# Patient Record
Sex: FEMALE | Race: WHITE | NOT HISPANIC OR LATINO | Employment: UNEMPLOYED | URBAN - METROPOLITAN AREA
[De-identification: names, ages, dates, MRNs, and addresses within clinical notes are randomized per-mention and may not be internally consistent; named-entity substitution may affect disease eponyms.]

---

## 2017-03-07 PROBLEM — M85.80 OSTEOPENIA: Status: ACTIVE | Noted: 2017-03-07

## 2017-03-08 ENCOUNTER — OFFICE VISIT (OUTPATIENT)
Dept: FAMILY MEDICINE CLINIC | Facility: CLINIC | Age: 57
End: 2017-03-08

## 2017-03-08 VITALS
HEART RATE: 75 BPM | WEIGHT: 110 LBS | OXYGEN SATURATION: 98 % | BODY MASS INDEX: 20.24 KG/M2 | DIASTOLIC BLOOD PRESSURE: 80 MMHG | SYSTOLIC BLOOD PRESSURE: 128 MMHG | HEIGHT: 62 IN | TEMPERATURE: 97.6 F

## 2017-03-08 DIAGNOSIS — R74.8 ELEVATED LIVER ENZYMES: Primary | ICD-10-CM

## 2017-03-08 LAB
HAV IGM SERPL QL IA: NORMAL
HBV CORE IGM SERPL QL IA: NORMAL
HBV SURFACE AG SERPL QL IA: NORMAL
HCV AB SER DONR QL: NORMAL

## 2017-03-08 PROCEDURE — 99213 OFFICE O/P EST LOW 20 MIN: CPT | Performed by: NURSE PRACTITIONER

## 2017-03-08 PROCEDURE — 36415 COLL VENOUS BLD VENIPUNCTURE: CPT | Performed by: NURSE PRACTITIONER

## 2017-03-08 PROCEDURE — 80074 ACUTE HEPATITIS PANEL: CPT | Performed by: NURSE PRACTITIONER

## 2017-03-08 NOTE — PROGRESS NOTES
Subjective   Kimberly Brito is a 56 y.o. female.     History of Present Illness Patient is here to review her labs from rheumatology. They showed elevated AST. She admits to increasing her alcohol consumption in the last 6 months.  She will drink 3 mixed drinks of hard liquor each night.  She does not work so the drinking is not interfering with her life. She does not drink in the morning to start her day. Feels she is able to stop at any time. No abd pain or nausea. No change in bowels.    The following portions of the patient's history were reviewed and updated as appropriate: allergies, current medications, past family history, past medical history, past social history, past surgical history and problem list.    Review of Systems   Constitutional: Negative for fatigue, fever and unexpected weight change.   HENT: Negative for congestion, hearing loss, nosebleeds, rhinorrhea, sore throat, trouble swallowing and voice change.    Eyes: Negative for pain, discharge, redness and visual disturbance.   Respiratory: Negative for cough, chest tightness, shortness of breath and wheezing.    Cardiovascular: Negative for chest pain, palpitations and leg swelling.   Gastrointestinal: Negative for abdominal distention, abdominal pain, anal bleeding, blood in stool, constipation, diarrhea, nausea and vomiting.   Endocrine: Negative for cold intolerance, heat intolerance, polydipsia, polyphagia and polyuria.   Genitourinary: Negative for dysuria, flank pain, frequency and hematuria.   Musculoskeletal: Negative for arthralgias, gait problem, joint swelling and myalgias.   Skin: Negative for color change and rash.   Neurological: Negative for dizziness, tremors, seizures, syncope, speech difficulty, weakness, numbness and headaches.   Hematological: Negative.    Psychiatric/Behavioral: Negative.        Objective   Physical Exam   Constitutional: She is oriented to person, place, and time. She appears well-developed and well-nourished.  No distress.   HENT:   Head: Normocephalic and atraumatic.   Right Ear: Tympanic membrane and external ear normal.   Left Ear: Tympanic membrane and external ear normal.   Nose: Nose normal.   Mouth/Throat: Oropharynx is clear and moist. No oropharyngeal exudate.   Eyes: Conjunctivae are normal. Pupils are equal, round, and reactive to light. Right eye exhibits no discharge. Left eye exhibits no discharge. No scleral icterus.   Neck: Neck supple. No tracheal deviation present. No thyromegaly present.   Cardiovascular: Normal rate, regular rhythm and normal heart sounds.  Exam reveals no gallop and no friction rub.    No murmur heard.  Pulmonary/Chest: Effort normal and breath sounds normal. No respiratory distress. She has no wheezes.   Abdominal: Soft. Bowel sounds are normal. She exhibits no distension and no mass. There is no tenderness.   Musculoskeletal: She exhibits no edema or deformity.   Lymphadenopathy:     She has no cervical adenopathy.   Neurological: She is alert and oriented to person, place, and time. Coordination normal.   Skin: Skin is warm and dry. No rash noted. No erythema.   Psychiatric: She has a normal mood and affect. Her speech is normal and behavior is normal. Judgment and thought content normal.   Nursing note and vitals reviewed.      Assessment/Plan   Kimberly was seen today for results.    Diagnoses and all orders for this visit:    Elevated liver enzymes  -     Hepatitis Panel, Acute    Discussed the nature of the disease including, risks, complications, implications, management, safe and proper use of medications. Encouraged keeping scheduled follow up appointments with me and any other providers. Encouraged patient to have appointment for complete physical, fasting labs, appropriate screenings, and immunizations on an annual basis.  She will abstain from alcohol. Repeat labs in 2 months.

## 2017-04-17 ENCOUNTER — OFFICE VISIT (OUTPATIENT)
Dept: FAMILY MEDICINE CLINIC | Facility: CLINIC | Age: 57
End: 2017-04-17

## 2017-04-17 VITALS
SYSTOLIC BLOOD PRESSURE: 106 MMHG | TEMPERATURE: 97.6 F | HEIGHT: 62 IN | HEART RATE: 71 BPM | WEIGHT: 111 LBS | OXYGEN SATURATION: 99 % | DIASTOLIC BLOOD PRESSURE: 70 MMHG | BODY MASS INDEX: 20.43 KG/M2

## 2017-04-17 DIAGNOSIS — Z00.00 PHYSICAL EXAM: Primary | ICD-10-CM

## 2017-04-17 DIAGNOSIS — M81.0 OSTEOPOROSIS: ICD-10-CM

## 2017-04-17 LAB
25(OH)D3 SERPL-MCNC: 36.4 NG/ML
ALBUMIN SERPL-MCNC: 4.4 G/DL (ref 3.2–4.8)
ALBUMIN/GLOB SERPL: 1.4 G/DL (ref 1.5–2.5)
ALP SERPL-CCNC: 34 U/L (ref 25–100)
ALT SERPL W P-5'-P-CCNC: 20 U/L (ref 7–40)
ANION GAP SERPL CALCULATED.3IONS-SCNC: 6 MMOL/L (ref 3–11)
ARTICHOKE IGE QN: 141 MG/DL (ref 0–130)
AST SERPL-CCNC: 35 U/L (ref 0–33)
BASOPHILS # BLD AUTO: 0.01 10*3/MM3 (ref 0–0.2)
BASOPHILS NFR BLD AUTO: 0.2 % (ref 0–1)
BILIRUB BLD-MCNC: NEGATIVE MG/DL
BILIRUB SERPL-MCNC: 0.8 MG/DL (ref 0.3–1.2)
BUN BLD-MCNC: 16 MG/DL (ref 9–23)
BUN/CREAT SERPL: 16 (ref 7–25)
CALCIUM SPEC-SCNC: 9.7 MG/DL (ref 8.7–10.4)
CHLORIDE SERPL-SCNC: 105 MMOL/L (ref 99–109)
CHOLEST SERPL-MCNC: 246 MG/DL (ref 0–200)
CLARITY, POC: CLEAR
CO2 SERPL-SCNC: 28 MMOL/L (ref 20–31)
COLOR UR: YELLOW
CREAT BLD-MCNC: 1 MG/DL (ref 0.6–1.3)
DEPRECATED RDW RBC AUTO: 44.8 FL (ref 37–54)
EOSINOPHIL # BLD AUTO: 0.06 10*3/MM3 (ref 0.1–0.3)
EOSINOPHIL NFR BLD AUTO: 1.3 % (ref 0–3)
ERYTHROCYTE [DISTWIDTH] IN BLOOD BY AUTOMATED COUNT: 13.2 % (ref 11.3–14.5)
GFR SERPL CREATININE-BSD FRML MDRD: 58 ML/MIN/1.73
GLOBULIN UR ELPH-MCNC: 3.2 GM/DL
GLUCOSE BLD-MCNC: 90 MG/DL (ref 70–100)
GLUCOSE UR STRIP-MCNC: NEGATIVE MG/DL
HCT VFR BLD AUTO: 42.4 % (ref 34.5–44)
HDLC SERPL-MCNC: 89 MG/DL (ref 40–60)
HGB BLD-MCNC: 13.8 G/DL (ref 11.5–15.5)
IMM GRANULOCYTES # BLD: 0 10*3/MM3 (ref 0–0.03)
IMM GRANULOCYTES NFR BLD: 0 % (ref 0–0.6)
KETONES UR QL: ABNORMAL
LEUKOCYTE EST, POC: NEGATIVE
LYMPHOCYTES # BLD AUTO: 1.11 10*3/MM3 (ref 0.6–4.8)
LYMPHOCYTES NFR BLD AUTO: 24.2 % (ref 24–44)
MCH RBC QN AUTO: 30.1 PG (ref 27–31)
MCHC RBC AUTO-ENTMCNC: 32.5 G/DL (ref 32–36)
MCV RBC AUTO: 92.4 FL (ref 80–99)
MONOCYTES # BLD AUTO: 0.48 10*3/MM3 (ref 0–1)
MONOCYTES NFR BLD AUTO: 10.5 % (ref 0–12)
NEUTROPHILS # BLD AUTO: 2.93 10*3/MM3 (ref 1.5–8.3)
NEUTROPHILS NFR BLD AUTO: 63.8 % (ref 41–71)
NITRITE UR-MCNC: NEGATIVE MG/ML
PH UR: 5.5 [PH] (ref 5–8)
PLATELET # BLD AUTO: 156 10*3/MM3 (ref 150–450)
PMV BLD AUTO: 10.6 FL (ref 6–12)
POTASSIUM BLD-SCNC: 5.2 MMOL/L (ref 3.5–5.5)
PROT SERPL-MCNC: 7.6 G/DL (ref 5.7–8.2)
PROT UR STRIP-MCNC: NEGATIVE MG/DL
RBC # BLD AUTO: 4.59 10*6/MM3 (ref 3.89–5.14)
RBC # UR STRIP: NEGATIVE /UL
SODIUM BLD-SCNC: 139 MMOL/L (ref 132–146)
SP GR UR: 1.02 (ref 1–1.03)
TRIGL SERPL-MCNC: 48 MG/DL (ref 0–150)
TSH SERPL DL<=0.05 MIU/L-ACNC: 1.54 MIU/ML (ref 0.35–5.35)
UROBILINOGEN UR QL: NORMAL
WBC NRBC COR # BLD: 4.59 10*3/MM3 (ref 3.5–10.8)

## 2017-04-17 PROCEDURE — 82306 VITAMIN D 25 HYDROXY: CPT | Performed by: FAMILY MEDICINE

## 2017-04-17 PROCEDURE — 81003 URINALYSIS AUTO W/O SCOPE: CPT | Performed by: FAMILY MEDICINE

## 2017-04-17 PROCEDURE — 36415 COLL VENOUS BLD VENIPUNCTURE: CPT | Performed by: FAMILY MEDICINE

## 2017-04-17 PROCEDURE — 80061 LIPID PANEL: CPT | Performed by: FAMILY MEDICINE

## 2017-04-17 PROCEDURE — 80053 COMPREHEN METABOLIC PANEL: CPT | Performed by: FAMILY MEDICINE

## 2017-04-17 PROCEDURE — 85025 COMPLETE CBC W/AUTO DIFF WBC: CPT | Performed by: FAMILY MEDICINE

## 2017-04-17 PROCEDURE — 84443 ASSAY THYROID STIM HORMONE: CPT | Performed by: FAMILY MEDICINE

## 2017-04-17 PROCEDURE — 99396 PREV VISIT EST AGE 40-64: CPT | Performed by: FAMILY MEDICINE

## 2017-04-17 NOTE — PROGRESS NOTES
Subjective   Kimberly Brito is a 56 y.o. female.     History of Present Illness   The patient is here for a physical exam.    She states she is doing well.  Denies any chest pain or shortness of breath.    Does not smoke.  Does use occasional alcohol.  Denies drug use.    Sees her eye doctor every two years.  Sees her dentist twice a year.  Sees Dr Quezada with dermatology.  Sees Dr Tabares with Rheumatology.    Sees Dr Walsh for GYN exam and mammograms. Last exam was in August 2016. Last mammogram was in May 2016.  Also for bone density checks . Last bone density was in Jan 2016 -Osteoporosis.Taking Prolia injections twice a year.    She is up to date on immunizations.    The following portions of the patient's history were reviewed and updated as appropriate: allergies, current medications, past family history, past medical history, past social history, past surgical history and problem list.    Review of Systems   Constitutional: Negative.    HENT: Negative.         Occasional episodes of swollen taste buds of the back of her tongue.     Eyes: Negative.    Respiratory: Negative.    Cardiovascular: Negative.    Gastrointestinal: Negative.    Endocrine: Positive for cold intolerance. Negative for heat intolerance, polydipsia, polyphagia and polyuria.   Genitourinary: Negative.    Musculoskeletal: Negative.    Skin: Negative.    Allergic/Immunologic: Negative.    Neurological: Negative.    Hematological: Negative.    Psychiatric/Behavioral: Negative.        Objective   Physical Exam   Constitutional: She is oriented to person, place, and time. She appears well-developed and well-nourished. No distress.   HENT:   Head: Normocephalic and atraumatic.   Right Ear: External ear normal.   Left Ear: External ear normal.   Nose: Nose normal.   Mouth/Throat: Oropharynx is clear and moist.   Eyes: Conjunctivae and EOM are normal. Pupils are equal, round, and reactive to light.   Neck: Normal range of motion. Neck supple. No  thyromegaly present.   Cardiovascular: Normal rate, regular rhythm, normal heart sounds and intact distal pulses.    Pulmonary/Chest: Effort normal and breath sounds normal. No respiratory distress. She has no wheezes. She has no rales.   Abdominal: Soft. Bowel sounds are normal. There is no tenderness.   Musculoskeletal: Normal range of motion.   Neurological: She is alert and oriented to person, place, and time. She has normal reflexes.   Skin: Skin is warm and dry. She is not diaphoretic.   Psychiatric: She has a normal mood and affect. Her behavior is normal. Judgment and thought content normal.   Nursing note and vitals reviewed.      Assessment/Plan   Kimberly was seen today for annual exam.    Diagnoses and all orders for this visit:    Physical exam  -     POCT urinalysis dipstick, automated  -     CBC & Differential  -     Comprehensive Metabolic Panel  -     Lipid Panel  -     TSH  -     Vitamin D 25 Hydroxy    Osteoporosis  -     Vitamin D 25 Hydroxy                Drink plenty fluids.    Continue medications as doing.    Check a CBC,CMP,Lipids,TSH and Vitamin D. Report results by letter.    Scribed for Dr Sushil Jj by Anu Chaparro CMA.    I, Sushil Jj MD, personally performed the services described in this documentation, as scribed by Anu Chaparro in my presence, and is both accurate and complete.      Follow up in 1 year. Sooner if needed.

## 2017-07-24 ENCOUNTER — TRANSCRIBE ORDERS (OUTPATIENT)
Dept: OBSTETRICS AND GYNECOLOGY | Facility: CLINIC | Age: 57
End: 2017-07-24

## 2017-07-24 DIAGNOSIS — Z12.31 VISIT FOR SCREENING MAMMOGRAM: Primary | ICD-10-CM

## 2017-08-14 ENCOUNTER — HOSPITAL ENCOUNTER (OUTPATIENT)
Dept: MAMMOGRAPHY | Facility: HOSPITAL | Age: 57
Discharge: HOME | End: 2017-08-14
Attending: OBSTETRICS & GYNECOLOGY | Admitting: OBSTETRICS & GYNECOLOGY

## 2017-08-14 DIAGNOSIS — Z12.31 VISIT FOR SCREENING MAMMOGRAM: ICD-10-CM

## 2017-08-14 PROCEDURE — 77063 BREAST TOMOSYNTHESIS BI: CPT | Performed by: RADIOLOGY

## 2017-08-14 PROCEDURE — 77063 BREAST TOMOSYNTHESIS BI: CPT

## 2017-08-14 PROCEDURE — 77067 SCR MAMMO BI INCL CAD: CPT | Performed by: RADIOLOGY

## 2017-08-14 PROCEDURE — G0202 SCR MAMMO BI INCL CAD: HCPCS

## 2017-08-30 ENCOUNTER — OFFICE VISIT (OUTPATIENT)
Dept: OBSTETRICS AND GYNECOLOGY | Facility: CLINIC | Age: 57
End: 2017-08-30

## 2017-08-30 VITALS
SYSTOLIC BLOOD PRESSURE: 100 MMHG | BODY MASS INDEX: 20.5 KG/M2 | WEIGHT: 111.4 LBS | HEIGHT: 62 IN | DIASTOLIC BLOOD PRESSURE: 66 MMHG

## 2017-08-30 DIAGNOSIS — M81.0 OSTEOPOROSIS: ICD-10-CM

## 2017-08-30 DIAGNOSIS — N95.2 VAGINAL ATROPHY: ICD-10-CM

## 2017-08-30 DIAGNOSIS — Z78.0 MENOPAUSE: Primary | ICD-10-CM

## 2017-08-30 DIAGNOSIS — Z01.419 ENCOUNTER FOR GYNECOLOGICAL EXAMINATION WITHOUT ABNORMAL FINDING: ICD-10-CM

## 2017-08-30 PROBLEM — M85.80 OSTEOPENIA: Status: RESOLVED | Noted: 2017-03-07 | Resolved: 2017-08-30

## 2017-08-30 PROCEDURE — 99396 PREV VISIT EST AGE 40-64: CPT | Performed by: OBSTETRICS & GYNECOLOGY

## 2017-08-30 NOTE — PROGRESS NOTES
Chief Complaint   Patient presents with   • Gynecologic Exam       Kimberly Brito is a 56 y.o. year old  presenting to be seen for her annual exam.This patient is menopausal and does not use estrogen/progestin therapy.  She has a history of vaginal atrophy but has not desired treatment.  She has a history of osteoporosis and is treated at the [facility] with Prolia injections every 6 months.  She has no other gynecologic complaints.    SCREENING TESTS    Year 2012   Age                         PAP     Neg.                    HPV high risk                         Mammogram     benign benign                   DAMON score                         Breast MRI                         Lipids                         Vitamin D                         Colonoscopy                         DEXA  Frax (hip/any)   osteoporosis  osteoporosis                    Ovarian Screen                             She exercises regularly: yes.  She wears her seat belt: yes.  She has concerns about domestic violence: no.  She has noticed changes in height: no    GYN screening history:  · Last pap: was done on approximately 8/15/2016 and the result was: normal PAP.  · Last mammogram: was done on approximately 2017 and the result was: Birads I (Normal).  · Last DEXA: was done on approximately 8/15/2016 and the results were: osteopenia of hips, osteopenia of spine and osteoporosis of the femoral neck.    No Additional Complaints Reported    The following portions of the patient's history were reviewed and updated as appropriate:vital signs and   She  does not have any pertinent problems on file.  She  has a past surgical history that includes Excision Lesion (Left); Colonoscopy (2012); and Breast biopsy (Left, 10/14/2008).  Her family history includes Breast cancer (age of onset: 75) in her mother; Colon cancer in her  "mother; Diabetes in her brother and father; Heart disease in her brother and maternal grandfather; Lung cancer in her father; Ovarian cancer in her maternal aunt; Stroke in her maternal grandmother.  She  reports that she has never smoked. She has never used smokeless tobacco. She reports that she drinks about 8.4 oz of alcohol per week  She reports that she does not use illicit drugs.  Current Outpatient Prescriptions   Medication Sig Dispense Refill   • calcium carbonate-vitamin d (CALCIUM 600+D) 600-400 MG-UNIT per tablet Take 1 tablet by mouth Daily.     • Multiple Vitamins-Minerals (MULTIVITAMIN ADULT PO) Take 1 tablet by mouth Daily.     • denosumab (PROLIA) 60 MG/ML solution syringe Inject 1 dose under the skin every 6 (six) months.       No current facility-administered medications for this visit.      She is allergic to alendronate..    Review of Systems  A comprehensive review of systems was taken.  Constitutional: negative for fever, chills, activity change, appetite change, fatigue and unexpected weight change.  Respiratory: negative  Cardiovascular: negative  Gastrointestinal: negative  Genitourinary:negative  Musculoskeletal:negative  Behavioral/Psych: negative       /66  Ht 62\" (157.5 cm)  Wt 111 lb 6.4 oz (50.5 kg)  LMP  (Approximate)  BMI 20.38 kg/m2    Physical Exam    General:  alert; cooperative; well developed; well nourished   Skin:  No suspicious lesions seen   Thyroid: normal to inspection and palpation   Lungs:  clear to auscultation bilaterally   Heart:  regular rate and rhythm, S1, S2 normal, no murmur, click, rub or gallop   Breasts:  Examined in supine position  Symmetric without masses or skin dimpling  Nipples normal without inversion, lesions or discharge  There are no palpable axillary nodes   Abdomen: soft, non-tender; no masses  no umbilical or inginual hernias are present  no hepato-splenomegaly   Pelvis: Clinical staff was present for exam  External genitalia:  normal " appearance of the external genitalia including Bartholin's and Derwood's glands.  Vaginal:  atrophic mucosal changes are present;  Cervix:  normal appearance.  Uterus:  normal size, shape and consistency. anteverted;  Adnexa:  non palpable bilaterally.  Rectal:  anus visually normal appearing. recto-vaginal exam unremarkable and confirms findings;     Lab Review   No data reviewed    Imaging  Mammogram results- benign, and DEXA- osteoporosis of the femoral neck and osteopenia of the total hip and spine         ASSESSMENT  Problems Addressed this Visit        Musculoskeletal and Integument    Osteoporosis       Genitourinary    Menopause - Primary    Vaginal atrophy      Other Visit Diagnoses     Encounter for gynecological examination without abnormal finding        Relevant Orders    Liquid-based Pap Smear, Screening          PLAN    Medications prescribed this encounter:    New Medications Ordered This Visit   Medications   • calcium carbonate-vitamin d (CALCIUM 600+D) 600-400 MG-UNIT per tablet     Sig: Take 1 tablet by mouth Daily.   • Multiple Vitamins-Minerals (MULTIVITAMIN ADULT PO)     Sig: Take 1 tablet by mouth Daily.   · DEXA in January of 2018  · Follow up at the [facility] for Prolia injections  · Calcium, 600 mg/ Vit. D, 400 IU daily; regular weight-bearing exercise  · Follow up: 12 month(s)  *Please note that portions of this documentation may have been completed with a voice recognition program.  Efforts were made to edit this dictation, but occasional words may have been mistranscribed.       This note was electronically signed.    SHAYNE Walsh MD  August 30, 2017  1:46 PM

## 2018-01-22 ENCOUNTER — HOSPITAL ENCOUNTER (OUTPATIENT)
Dept: BONE DENSITY | Facility: HOSPITAL | Age: 58
Discharge: HOME | End: 2018-01-22
Attending: OBSTETRICS & GYNECOLOGY | Admitting: OBSTETRICS & GYNECOLOGY

## 2018-01-22 DIAGNOSIS — M81.0 OSTEOPOROSIS: ICD-10-CM

## 2018-01-22 PROCEDURE — 77080 DXA BONE DENSITY AXIAL: CPT

## 2018-06-06 ENCOUNTER — OFFICE VISIT (OUTPATIENT)
Dept: FAMILY MEDICINE CLINIC | Facility: CLINIC | Age: 58
End: 2018-06-06

## 2018-06-06 VITALS
BODY MASS INDEX: 20.43 KG/M2 | OXYGEN SATURATION: 98 % | WEIGHT: 111 LBS | DIASTOLIC BLOOD PRESSURE: 66 MMHG | HEART RATE: 68 BPM | HEIGHT: 62 IN | SYSTOLIC BLOOD PRESSURE: 104 MMHG | TEMPERATURE: 97.4 F | RESPIRATION RATE: 16 BRPM

## 2018-06-06 DIAGNOSIS — M81.6 LOCALIZED OSTEOPOROSIS WITHOUT CURRENT PATHOLOGICAL FRACTURE: ICD-10-CM

## 2018-06-06 DIAGNOSIS — Z00.00 WELL ADULT EXAM: Primary | ICD-10-CM

## 2018-06-06 LAB
25(OH)D3 SERPL-MCNC: 27.9 NG/ML
ALBUMIN SERPL-MCNC: 4.42 G/DL (ref 3.2–4.8)
ALBUMIN/GLOB SERPL: 1.5 G/DL (ref 1.5–2.5)
ALP SERPL-CCNC: 39 U/L (ref 25–100)
ALT SERPL W P-5'-P-CCNC: 16 U/L (ref 7–40)
ANION GAP SERPL CALCULATED.3IONS-SCNC: 9 MMOL/L (ref 3–11)
ARTICHOKE IGE QN: 138 MG/DL (ref 0–130)
AST SERPL-CCNC: 31 U/L (ref 0–33)
BASOPHILS # BLD AUTO: 0.01 10*3/MM3 (ref 0–0.2)
BASOPHILS NFR BLD AUTO: 0.2 % (ref 0–1)
BILIRUB BLD-MCNC: NEGATIVE MG/DL
BILIRUB SERPL-MCNC: 0.9 MG/DL (ref 0.3–1.2)
BUN BLD-MCNC: 12 MG/DL (ref 9–23)
BUN/CREAT SERPL: 14.1 (ref 7–25)
CALCIUM SPEC-SCNC: 8.8 MG/DL (ref 8.7–10.4)
CHLORIDE SERPL-SCNC: 104 MMOL/L (ref 99–109)
CHOLEST SERPL-MCNC: 247 MG/DL (ref 0–200)
CLARITY, POC: CLEAR
CO2 SERPL-SCNC: 27 MMOL/L (ref 20–31)
COLOR UR: YELLOW
CREAT BLD-MCNC: 0.85 MG/DL (ref 0.6–1.3)
DEPRECATED RDW RBC AUTO: 45.4 FL (ref 37–54)
EOSINOPHIL # BLD AUTO: 0.11 10*3/MM3 (ref 0–0.3)
EOSINOPHIL NFR BLD AUTO: 2.4 % (ref 0–3)
ERYTHROCYTE [DISTWIDTH] IN BLOOD BY AUTOMATED COUNT: 13.7 % (ref 11.3–14.5)
EXPIRATION DATE: ABNORMAL
GFR SERPL CREATININE-BSD FRML MDRD: 69 ML/MIN/1.73
GLOBULIN UR ELPH-MCNC: 3 GM/DL
GLUCOSE BLD-MCNC: 84 MG/DL (ref 70–100)
GLUCOSE UR STRIP-MCNC: NEGATIVE MG/DL
HCT VFR BLD AUTO: 43 % (ref 34.5–44)
HDLC SERPL-MCNC: 107 MG/DL (ref 40–60)
HGB BLD-MCNC: 14.3 G/DL (ref 11.5–15.5)
IMM GRANULOCYTES # BLD: 0 10*3/MM3 (ref 0–0.03)
IMM GRANULOCYTES NFR BLD: 0 % (ref 0–0.6)
KETONES UR QL: ABNORMAL
LEUKOCYTE EST, POC: NEGATIVE
LYMPHOCYTES # BLD AUTO: 0.99 10*3/MM3 (ref 0.6–4.8)
LYMPHOCYTES NFR BLD AUTO: 22 % (ref 24–44)
Lab: ABNORMAL
MCH RBC QN AUTO: 30.2 PG (ref 27–31)
MCHC RBC AUTO-ENTMCNC: 33.3 G/DL (ref 32–36)
MCV RBC AUTO: 90.7 FL (ref 80–99)
MONOCYTES # BLD AUTO: 0.47 10*3/MM3 (ref 0–1)
MONOCYTES NFR BLD AUTO: 10.4 % (ref 0–12)
NEUTROPHILS # BLD AUTO: 2.93 10*3/MM3 (ref 1.5–8.3)
NEUTROPHILS NFR BLD AUTO: 65 % (ref 41–71)
NITRITE UR-MCNC: NEGATIVE MG/ML
PH UR: 6.5 [PH] (ref 5–8)
PLATELET # BLD AUTO: 154 10*3/MM3 (ref 150–450)
PMV BLD AUTO: 10.9 FL (ref 6–12)
POTASSIUM BLD-SCNC: 4 MMOL/L (ref 3.5–5.5)
PROT SERPL-MCNC: 7.4 G/DL (ref 5.7–8.2)
PROT UR STRIP-MCNC: ABNORMAL MG/DL
RBC # BLD AUTO: 4.74 10*6/MM3 (ref 3.89–5.14)
RBC # UR STRIP: NEGATIVE /UL
SODIUM BLD-SCNC: 140 MMOL/L (ref 132–146)
SP GR UR: 1.02 (ref 1–1.03)
TRIGL SERPL-MCNC: 72 MG/DL (ref 0–150)
TSH SERPL DL<=0.05 MIU/L-ACNC: 2.44 MIU/ML (ref 0.35–5.35)
UROBILINOGEN UR QL: NORMAL
WBC NRBC COR # BLD: 4.51 10*3/MM3 (ref 3.5–10.8)

## 2018-06-06 PROCEDURE — 84443 ASSAY THYROID STIM HORMONE: CPT | Performed by: FAMILY MEDICINE

## 2018-06-06 PROCEDURE — 36415 COLL VENOUS BLD VENIPUNCTURE: CPT | Performed by: FAMILY MEDICINE

## 2018-06-06 PROCEDURE — 80061 LIPID PANEL: CPT | Performed by: FAMILY MEDICINE

## 2018-06-06 PROCEDURE — 80053 COMPREHEN METABOLIC PANEL: CPT | Performed by: FAMILY MEDICINE

## 2018-06-06 PROCEDURE — 85025 COMPLETE CBC W/AUTO DIFF WBC: CPT | Performed by: FAMILY MEDICINE

## 2018-06-06 PROCEDURE — 81003 URINALYSIS AUTO W/O SCOPE: CPT | Performed by: FAMILY MEDICINE

## 2018-06-06 PROCEDURE — 82306 VITAMIN D 25 HYDROXY: CPT | Performed by: FAMILY MEDICINE

## 2018-06-06 PROCEDURE — 99396 PREV VISIT EST AGE 40-64: CPT | Performed by: FAMILY MEDICINE

## 2018-06-06 NOTE — PROGRESS NOTES
Subjective   Kimberly Brito is a 57 y.o. female.     History of Present Illness   The patient is here for a physical exam.     She states she is doing well.  Denies any chest pain or shortness of breath.     Does not smoke.  Does use occasional alcohol.  Denies drug use.     Sees her eye doctor every two years. Wears reading glasses.  Sees her dentist twice a year.  Sees Dr Quezada with dermatology.  Sees Dr Tabares with Rheumatology.     Sees Dr Walsh for GYN exam and mammograms. Last exam was in August 2016. Last mammogram was 08/31/2017.  Also for bone density checks . Last bone density was in Jan 2016 -Osteoporosis.Taking Prolia injections twice a year.     She is up to date on immunizations.      The following portions of the patient's history were reviewed and updated as appropriate: allergies, current medications, past social history and problem list.    Review of Systems   Constitutional: Negative.    HENT: Negative.    Eyes: Negative.    Respiratory: Negative.    Cardiovascular: Negative.    Gastrointestinal: Negative.    Endocrine: Positive for cold intolerance. Negative for heat intolerance, polydipsia, polyphagia and polyuria.   Genitourinary: Negative.    Musculoskeletal: Positive for arthralgias (left shoulderimproving with PT). Negative for back pain, gait problem, joint swelling, myalgias, neck pain and neck stiffness.   Skin: Positive for rash (neck and scalp occasionally). Negative for color change, pallor and wound.   Allergic/Immunologic: Negative.    Neurological: Positive for numbness (right hand/fingers occasionally). Negative for dizziness, tremors, seizures, syncope, facial asymmetry, speech difficulty, weakness, light-headedness and headaches.   Hematological: Negative for adenopathy. Bruises/bleeds easily (easy bruising).   Psychiatric/Behavioral: Positive for sleep disturbance (Insomnia). Negative for agitation, behavioral problems, confusion, decreased concentration, dysphoric mood,  "hallucinations, self-injury and suicidal ideas. The patient is not nervous/anxious and is not hyperactive.        Objective   /66   Pulse 68   Temp 97.4 °F (36.3 °C)   Resp 16   Ht 157.5 cm (62\")   Wt 50.3 kg (111 lb)   LMP  (LMP Unknown)   SpO2 98%   BMI 20.30 kg/m²   Physical Exam   Constitutional: She is oriented to person, place, and time. She appears well-developed and well-nourished. No distress.   HENT:   Head: Normocephalic and atraumatic.   Right Ear: External ear normal.   Left Ear: External ear normal.   Nose: Nose normal.   Mouth/Throat: Oropharynx is clear and moist.   Eyes: Conjunctivae and EOM are normal. Pupils are equal, round, and reactive to light.   Neck: Normal range of motion. Neck supple. No thyromegaly present.   Cardiovascular: Normal rate, regular rhythm, normal heart sounds and intact distal pulses.    Pulmonary/Chest: Effort normal and breath sounds normal. No respiratory distress. She has no wheezes. She has no rales. She exhibits no tenderness.   Abdominal: Soft. Bowel sounds are normal. There is no tenderness.   Musculoskeletal: Normal range of motion.   Neurological: She is alert and oriented to person, place, and time. She has normal reflexes.   Skin: Skin is warm and dry. She is not diaphoretic.   Psychiatric: She has a normal mood and affect. Her behavior is normal. Judgment and thought content normal.   Nursing note and vitals reviewed.      Assessment/Plan   Problem List Items Addressed This Visit        Musculoskeletal and Integument    Osteoporosis      Other Visit Diagnoses     Well adult exam    -  Primary              Drink plenty fluids.    Continue medications as doing.    Add OTC Vitamin D 2000 units daily.    Check a UA,CBC,CMP,Lipids,Vitamin D, and TSH. Report results by letter.    Follow up in 1 year. Sooner if needed.        We discussed with the patient the importance of maintaining a healthy weight by observing a diet that is low in carbohydrates.  We " also recommended avoiding consumption of high levels of sodium and caffeine to prevent hypertension. We recommended daily exercise and obtaining a weight with a BMI less than 26.  We also recommended avoiding the use of tobacco and alcohol.  We also recommended an annual physical with their primary care physician.        Scribed for Dr Sushil Jj by Anu Chaparro CMA.          Sushil ELLSWORTH MD, personally performed the services described in this documentation, as scribed by Anu Chaparro in my presence, and is both accurate and complete.          Sushil ELLSWORTH MD, personally performed the services described in this documentation, as scribed by Anu Chaparro in my presence, and is both accurate and complete.

## 2018-06-29 ENCOUNTER — OFFICE VISIT (OUTPATIENT)
Dept: FAMILY MEDICINE CLINIC | Facility: CLINIC | Age: 58
End: 2018-06-29

## 2018-06-29 VITALS
TEMPERATURE: 98.4 F | SYSTOLIC BLOOD PRESSURE: 102 MMHG | WEIGHT: 111 LBS | HEIGHT: 62 IN | DIASTOLIC BLOOD PRESSURE: 60 MMHG | BODY MASS INDEX: 20.43 KG/M2 | HEART RATE: 84 BPM | OXYGEN SATURATION: 99 % | RESPIRATION RATE: 16 BRPM

## 2018-06-29 DIAGNOSIS — J02.9 SORE THROAT: ICD-10-CM

## 2018-06-29 DIAGNOSIS — J06.9 ACUTE URI: Primary | ICD-10-CM

## 2018-06-29 LAB
EXPIRATION DATE: NORMAL
INTERNAL CONTROL: NORMAL
Lab: NORMAL
S PYO AG THROAT QL: NEGATIVE

## 2018-06-29 PROCEDURE — 99214 OFFICE O/P EST MOD 30 MIN: CPT | Performed by: NURSE PRACTITIONER

## 2018-06-29 PROCEDURE — 87880 STREP A ASSAY W/OPTIC: CPT | Performed by: NURSE PRACTITIONER

## 2018-06-29 RX ORDER — AZITHROMYCIN 250 MG/1
TABLET, FILM COATED ORAL
Qty: 6 TABLET | Refills: 0 | Status: SHIPPED | OUTPATIENT
Start: 2018-06-29 | End: 2018-07-04

## 2018-06-29 NOTE — PROGRESS NOTES
Subjective   Kimberly Brito is a 57 y.o. female.     URI    This is a new problem. The current episode started in the past 7 days. The problem has been gradually worsening. There has been no fever. Associated symptoms include congestion, coughing, headaches, a plugged ear sensation, sinus pain and a sore throat. Pertinent negatives include no abdominal pain, chest pain, diarrhea, dysuria, ear pain, nausea, neck pain, rash, swollen glands, vomiting or wheezing. She has tried nothing for the symptoms.       The following portions of the patient's history were reviewed and updated as appropriate: allergies, current medications, past family history, past medical history, past social history, past surgical history and problem list.     Allergies   Allergen Reactions   • Alendronate      Review of Systems   Constitutional: Positive for fatigue. Negative for appetite change, chills, diaphoresis and fever.   HENT: Positive for congestion, postnasal drip, sinus pain, sinus pressure and sore throat. Negative for ear pain, facial swelling, trouble swallowing and voice change.    Respiratory: Positive for cough. Negative for choking, chest tightness, shortness of breath, wheezing and stridor.    Cardiovascular: Negative for chest pain.   Gastrointestinal: Negative for abdominal pain, diarrhea, nausea and vomiting.   Genitourinary: Negative for dysuria.   Musculoskeletal: Negative for arthralgias, myalgias and neck pain.   Skin: Negative for rash.   Allergic/Immunologic: Positive for environmental allergies.   Neurological: Positive for headaches. Negative for dizziness.   Hematological: Negative for adenopathy.       Objective   Physical Exam   Constitutional: She is oriented to person, place, and time. Vital signs are normal. She appears well-developed and well-nourished. She is cooperative. No distress.   HENT:   Head: Normocephalic and atraumatic.   Right Ear: External ear normal. Tympanic membrane is bulging.   Left Ear:  External ear normal. Tympanic membrane is injected and bulging. A middle ear effusion is present.   Nose: Mucosal edema present.   Mouth/Throat: Uvula is midline and mucous membranes are normal. Posterior oropharyngeal erythema (mild with cobblestoning) present.   Right TM obscured by cerumen   Neck: Normal range of motion. Neck supple. No thyromegaly present.   Cardiovascular: Normal rate, regular rhythm and normal heart sounds.    Pulmonary/Chest: Effort normal and breath sounds normal.   Lymphadenopathy:     She has no cervical adenopathy.   Neurological: She is alert and oriented to person, place, and time.   Skin: Skin is warm, dry and intact. No rash noted. She is not diaphoretic.   Psychiatric: She has a normal mood and affect. Her behavior is normal. Judgment and thought content normal.   Vitals reviewed.    Vitals:    06/29/18 0914   BP: 102/60   Pulse: 84   Resp: 16   Temp: 98.4 °F (36.9 °C)   SpO2: 99%     Assessment/Plan   Kimberly was seen today for cough.    Diagnoses and all orders for this visit:    Acute URI  -     azithromycin (ZITHROMAX) 250 MG tablet; Take 2 tablets the first day, then 1 tablet daily for 4 days.    Sore throat  -     POC Rapid Strep A       Lab Results   Component Value Date    RAPSCRN Negative 06/29/2018     Discussed the nature of the medical condition(s) risks, complications, implications, management, safe and proper use of medications. Encouraged medication compliance, and keeping scheduled follow up appointments with me and any other providers.   Advised patient to begin OTC nasal steroid spray such as Flonase or Nasacort  daily as directed.

## 2018-06-29 NOTE — PATIENT INSTRUCTIONS
"Upper Respiratory Infection, Adult  Most upper respiratory infections (URIs) are a viral infection of the air passages leading to the lungs. A URI affects the nose, throat, and upper air passages. The most common type of URI is nasopharyngitis and is typically referred to as \"the common cold.\"  URIs run their course and usually go away on their own. Most of the time, a URI does not require medical attention, but sometimes a bacterial infection in the upper airways can follow a viral infection. This is called a secondary infection. Sinus and middle ear infections are common types of secondary upper respiratory infections.  Bacterial pneumonia can also complicate a URI. A URI can worsen asthma and chronic obstructive pulmonary disease (COPD). Sometimes, these complications can require emergency medical care and may be life threatening.  What are the causes?  Almost all URIs are caused by viruses. A virus is a type of germ and can spread from one person to another.  What increases the risk?  You may be at risk for a URI if:  · You smoke.  · You have chronic heart or lung disease.  · You have a weakened defense (immune) system.  · You are very young or very old.  · You have nasal allergies or asthma.  · You work in crowded or poorly ventilated areas.  · You work in health care facilities or schools.    What are the signs or symptoms?  Symptoms typically develop 2-3 days after you come in contact with a cold virus. Most viral URIs last 7-10 days. However, viral URIs from the influenza virus (flu virus) can last 14-18 days and are typically more severe. Symptoms may include:  · Runny or stuffy (congested) nose.  · Sneezing.  · Cough.  · Sore throat.  · Headache.  · Fatigue.  · Fever.  · Loss of appetite.  · Pain in your forehead, behind your eyes, and over your cheekbones (sinus pain).  · Muscle aches.    How is this diagnosed?  Your health care provider may diagnose a URI by:  · Physical exam.  · Tests to check that your " symptoms are not due to another condition such as:  ? Strep throat.  ? Sinusitis.  ? Pneumonia.  ? Asthma.    How is this treated?  A URI goes away on its own with time. It cannot be cured with medicines, but medicines may be prescribed or recommended to relieve symptoms. Medicines may help:  · Reduce your fever.  · Reduce your cough.  · Relieve nasal congestion.    Follow these instructions at home:  · Take medicines only as directed by your health care provider.  · Gargle warm saltwater or take cough drops to comfort your throat as directed by your health care provider.  · Use a warm mist humidifier or inhale steam from a shower to increase air moisture. This may make it easier to breathe.  · Drink enough fluid to keep your urine clear or pale yellow.  · Eat soups and other clear broths and maintain good nutrition.  · Rest as needed.  · Return to work when your temperature has returned to normal or as your health care provider advises. You may need to stay home longer to avoid infecting others. You can also use a face mask and careful hand washing to prevent spread of the virus.  · Increase the usage of your inhaler if you have asthma.  · Do not use any tobacco products, including cigarettes, chewing tobacco, or electronic cigarettes. If you need help quitting, ask your health care provider.  How is this prevented?  The best way to protect yourself from getting a cold is to practice good hygiene.  · Avoid oral or hand contact with people with cold symptoms.  · Wash your hands often if contact occurs.    There is no clear evidence that vitamin C, vitamin E, echinacea, or exercise reduces the chance of developing a cold. However, it is always recommended to get plenty of rest, exercise, and practice good nutrition.  Contact a health care provider if:  · You are getting worse rather than better.  · Your symptoms are not controlled by medicine.  · You have chills.  · You have worsening shortness of breath.  · You have  brown or red mucus.  · You have yellow or brown nasal discharge.  · You have pain in your face, especially when you bend forward.  · You have a fever.  · You have swollen neck glands.  · You have pain while swallowing.  · You have white areas in the back of your throat.  Get help right away if:  · You have severe or persistent:  ? Headache.  ? Ear pain.  ? Sinus pain.  ? Chest pain.  · You have chronic lung disease and any of the following:  ? Wheezing.  ? Prolonged cough.  ? Coughing up blood.  ? A change in your usual mucus.  · You have a stiff neck.  · You have changes in your:  ? Vision.  ? Hearing.  ? Thinking.  ? Mood.  This information is not intended to replace advice given to you by your health care provider. Make sure you discuss any questions you have with your health care provider.  Document Released: 06/13/2002 Document Revised: 08/20/2017 Document Reviewed: 03/25/2015  [employer] Interactive Patient Education © 2018 [employer].  Azithromycin tablets  What is this medicine?  AZITHROMYCIN (az ith miroslava SHYLA sin) is a macrolide antibiotic. It is used to treat or prevent certain kinds of bacterial infections. It will not work for colds, flu, or other viral infections.  This medicine may be used for other purposes; ask your health care provider or pharmacist if you have questions.  COMMON BRAND NAME(S): Zithromax, Zithromax Tri-[place], Zithromax Z-[place]  What should I tell my health care provider before I take this medicine?  They need to know if you have any of these conditions:  -kidney disease  -liver disease  -irregular heartbeat or heart disease  -an unusual or allergic reaction to azithromycin, erythromycin, other macrolide antibiotics, foods, dyes, or preservatives  -pregnant or trying to get pregnant  -breast-feeding  How should I use this medicine?  Take this medicine by mouth with a full glass of water. Follow the directions on the prescription label. The tablets can be taken with food or on an empty  stomach. If the medicine upsets your stomach, take it with food. Take your medicine at regular intervals. Do not take your medicine more often than directed. Take all of your medicine as directed even if you think your are better. Do not skip doses or stop your medicine early.  Talk to your pediatrician regarding the use of this medicine in children. While this drug may be prescribed for children as young as 6 months for selected conditions, precautions do apply.  Overdosage: If you think you have taken too much of this medicine contact a poison control center or emergency room at once.  NOTE: This medicine is only for you. Do not share this medicine with others.  What if I miss a dose?  If you miss a dose, take it as soon as you can. If it is almost time for your next dose, take only that dose. Do not take double or extra doses.  What may interact with this medicine?  Do not take this medicine with any of the following medications:  -lincomycin  This medicine may also interact with the following medications:  -amiodarone  -antacids  -birth control pills  -cyclosporine  -digoxin  -magnesium  -nelfinavir  -phenytoin  -warfarin  This list may not describe all possible interactions. Give your health care provider a list of all the medicines, herbs, non-prescription drugs, or dietary supplements you use. Also tell them if you smoke, drink alcohol, or use illegal drugs. Some items may interact with your medicine.  What should I watch for while using this medicine?  Tell your doctor or healthcare professional if your symptoms do not start to get better or if they get worse.  Do not treat diarrhea with over the counter products. Contact your doctor if you have diarrhea that lasts more than 2 days or if it is severe and watery.  This medicine can make you more sensitive to the sun. Keep out of the sun. If you cannot avoid being in the sun, wear protective clothing and use sunscreen. Do not use sun lamps or tanning  beds/booths.  What side effects may I notice from receiving this medicine?  Side effects that you should report to your doctor or health care professional as soon as possible:  -allergic reactions like skin rash, itching or hives, swelling of the face, lips, or tongue  -confusion, nightmares or hallucinations  -dark urine  -difficulty breathing  -hearing loss  -irregular heartbeat or chest pain  -pain or difficulty passing urine  -redness, blistering, peeling or loosening of the skin, including inside the mouth  -white patches or sores in the mouth  -yellowing of the eyes or skin  Side effects that usually do not require medical attention (report to your doctor or health care professional if they continue or are bothersome):  -diarrhea  -dizziness, drowsiness  -headache  -stomach upset or vomiting  -tooth discoloration  -vaginal irritation  This list may not describe all possible side effects. Call your doctor for medical advice about side effects. You may report side effects to FDA at 9-611-FDA-9674.  Where should I keep my medicine?  Keep out of the reach of children.  Store at room temperature between 15 and 30 degrees C (59 and 86 degrees F). Throw away any unused medicine after the expiration date.  NOTE: This sheet is a summary. It may not cover all possible information. If you have questions about this medicine, talk to your doctor, pharmacist, or health care provider.  © 2018 Elsevier/[employer] (2017-02-14 15:26:03)    Pharyngitis  Pharyngitis is redness, pain, and swelling (inflammation) of the throat (pharynx). It is a very common cause of sore throat. Pharyngitis can be caused by a bacteria, but it is usually caused by a virus. Most cases of pharyngitis get better on their own without treatment.  What are the causes?  This condition may be caused by:  · Infection by viruses (viral). Viral pharyngitis spreads from person to person (is contagious) through coughing, sneezing, and sharing of personal  items or utensils such as cups, forks, spoons, and toothbrushes.  · Infection by bacteria (bacterial). Bacterial pharyngitis may be spread by touching the nose or face after coming in contact with the bacteria, or through more intimate contact, such as kissing.  · Allergies. Allergies can cause buildup of mucus in the throat (post-nasal drip), leading to inflammation and irritation. Allergies can also cause blocked nasal passages, forcing breathing through the mouth, which dries and irritates the throat.    What increases the risk?  You are more likely to develop this condition if:  · You are 5-24 years old.  · You are exposed to crowded environments such as , school, or dormitory living.  · You live in a cold climate.  · You have a weakened disease-fighting (immune) system.    What are the signs or symptoms?  Symptoms of this condition vary by the cause (viral, bacterial, or allergies) and can include:  · Sore throat.  · Fatigue.  · Low-grade fever.  · Headache.  · Joint pain and muscle aches.  · Skin rashes.  · Swollen glands in the throat (lymph nodes).  · Plaque-like film on the throat or tonsils. This is often a symptom of bacterial pharyngitis.  · Vomiting.  · Stuffy nose (nasal congestion).  · Cough.  · Red, itchy eyes (conjunctivitis).  · Loss of appetite.    How is this diagnosed?  This condition is often diagnosed based on your medical history and a physical exam. Your health care provider will ask you questions about your illness and your symptoms. A swab of your throat may be done to check for bacteria (rapid strep test). Other lab tests may also be done, depending on the suspected cause, but these are rare.  How is this treated?  This condition usually gets better in 3-4 days without medicine. Bacterial pharyngitis may be treated with antibiotic medicines.  Follow these instructions at home:  · Take over-the-counter and prescription medicines only as told by your health care provider.  ? If you  were prescribed an antibiotic medicine, take it as told by your health care provider. Do not stop taking the antibiotic even if you start to feel better.  ? Do not give children aspirin because of the association with Reye syndrome.  · Drink enough water and fluids to keep your urine clear or pale yellow.  · Get a lot of rest.  · Gargle with a salt-water mixture 3-4 times a day or as needed. To make a salt-water mixture, completely dissolve ½-1 tsp of salt in 1 cup of warm water.  · If your health care provider approves, you may use throat lozenges or sprays to soothe your throat.  Contact a health care provider if:  · You have large, tender lumps in your neck.  · You have a rash.  · You cough up green, yellow-brown, or bloody spit.  Get help right away if:  · Your neck becomes stiff.  · You drool or are unable to swallow liquids.  · You cannot drink or take medicines without vomiting.  · You have severe pain that does not go away, even after you take medicine.  · You have trouble breathing, and it is not caused by a stuffy nose.  · You have new pain and swelling in your joints such as the knees, ankles, wrists, or elbows.  Summary  · Pharyngitis is redness, pain, and swelling (inflammation) of the throat (pharynx).  · While pharyngitis can be caused by a bacteria, the most common causes are viral.  · Most cases of pharyngitis get better on their own without treatment.  · Bacterial pharyngitis is treated with antibiotic medicines.  This information is not intended to replace advice given to you by your health care provider. Make sure you discuss any questions you have with your health care provider.  Document Released: 12/18/2006 Document Revised: 01/23/2018 Document Reviewed: 01/23/2018  [employer] Interactive Patient Education © 2018 [employer]vier Inc.

## 2018-08-02 ENCOUNTER — TRANSCRIBE ORDERS (OUTPATIENT)
Dept: ADMINISTRATIVE | Facility: HOSPITAL | Age: 58
End: 2018-08-02

## 2018-08-02 DIAGNOSIS — Z12.31 VISIT FOR SCREENING MAMMOGRAM: Primary | ICD-10-CM

## 2018-09-12 ENCOUNTER — APPOINTMENT (OUTPATIENT)
Dept: MAMMOGRAPHY | Facility: HOSPITAL | Age: 58
End: 2018-09-12
Attending: OBSTETRICS & GYNECOLOGY

## 2018-09-24 ENCOUNTER — OFFICE VISIT (OUTPATIENT)
Dept: OBSTETRICS AND GYNECOLOGY | Facility: CLINIC | Age: 58
End: 2018-09-24

## 2018-09-24 VITALS
DIASTOLIC BLOOD PRESSURE: 74 MMHG | HEIGHT: 62 IN | BODY MASS INDEX: 20.94 KG/M2 | SYSTOLIC BLOOD PRESSURE: 112 MMHG | WEIGHT: 113.8 LBS

## 2018-09-24 DIAGNOSIS — Z78.0 MENOPAUSE: Primary | ICD-10-CM

## 2018-09-24 DIAGNOSIS — N95.2 VAGINAL ATROPHY: ICD-10-CM

## 2018-09-24 DIAGNOSIS — Z80.41 FAMILY HISTORY OF OVARIAN CANCER: ICD-10-CM

## 2018-09-24 DIAGNOSIS — Z01.419 ENCOUNTER FOR GYNECOLOGICAL EXAMINATION WITHOUT ABNORMAL FINDING: ICD-10-CM

## 2018-09-24 PROCEDURE — 99396 PREV VISIT EST AGE 40-64: CPT | Performed by: OBSTETRICS & GYNECOLOGY

## 2018-09-24 RX ORDER — MELATONIN
2000 DAILY
COMMUNITY

## 2018-09-24 NOTE — PROGRESS NOTES
Chief Complaint   Patient presents with   • Gynecologic Exam       Kimberly Brito is a 57 y.o. year old  presenting to be seen for her annual exam.  This patient has a history of osteoporosis and is followed at the [facility] by Dr. Tabares.  She has had 7 Prolia injections.  Her last DEXA in 2018 showed improvement.  She is menopausal and does not use estrogen/progestin replacement therapy.  She denies menopausal symptoms.    SCREENING TESTS    Year 2012   Age                         PAP                         HPV high risk                         Mammogram      benign                   DAMON score                         Breast MRI                         Lipids                         Vitamin D                         Colonoscopy                         DEXA  Frax (hip/any)       osteopenia                  Ovarian Screen                             She exercises regularly: yes.  She wears her seat belt: yes.  She has concerns about domestic violence: no.  She has noticed changes in height: no    GYN screening history:  · Last mammogram: was done on approximately 2017 and the result was: Birads I (Normal).  · Last DEXA: was done on approximately 2018 and the results were: osteopenia of hips, osteopenia of spine and osteopenia of the femoral neck.    No Additional Complaints Reported    The following portions of the patient's history were reviewed and updated as appropriate:vital signs and   She  has a past medical history of Acute sinusitis; Family history of breast cancer in mother; Family history of ovarian cancer; Melanoma of hip (CMS/HCC); Menopause; Osteoporosis; and Rotator cuff tendinitis.  She  does not have any pertinent problems on file.  She  has a past surgical history that includes Excision Lesion (Left); Colonoscopy (2012); and Breast biopsy (Left,  "10/14/2008).  Her family history includes Breast cancer (age of onset: 75) in her mother; Colon cancer in her mother; Diabetes in her brother and father; Heart disease in her brother and maternal grandfather; Lung cancer in her father; Ovarian cancer in her maternal aunt; Stroke in her maternal grandmother.  She  reports that she has never smoked. She has never used smokeless tobacco. She reports that she drinks about 8.4 oz of alcohol per week . She reports that she does not use drugs.  Current Outpatient Prescriptions   Medication Sig Dispense Refill   • cholecalciferol (VITAMIN D3) 1000 units tablet Take 1,000 Units by mouth Daily.     • denosumab (PROLIA) 60 MG/ML solution syringe Inject 1 dose under the skin every 6 (six) months.     • Multiple Vitamins-Minerals (MULTIVITAMIN ADULT PO) Take 1 tablet by mouth Daily.       No current facility-administered medications for this visit.      She is allergic to alendronate..    Review of Systems  A comprehensive review of systems was taken.  Constitutional: negative for fever, chills, activity change, appetite change, fatigue and unexpected weight change.  Respiratory: negative  Cardiovascular: negative  Gastrointestinal: negative  Genitourinary:negative  Musculoskeletal:negative  Behavioral/Psych: negative       /74   Ht 157.5 cm (62\")   Wt 51.6 kg (113 lb 12.8 oz)   LMP  (LMP Unknown)   BMI 20.81 kg/m²     Physical Exam    General:  alert; cooperative; well developed; well nourished   Skin:  No suspicious lesions seen   Thyroid: normal to inspection and palpation   Lungs:  clear to auscultation bilaterally   Heart:  regular rate and rhythm, S1, S2 normal, no murmur, click, rub or gallop   Breasts:  Examined in supine position  Symmetric without masses or skin dimpling  Nipples normal without inversion, lesions or discharge  There are no palpable axillary nodes   Abdomen: soft, non-tender; no masses  no umbilical or inginual hernias are present  no " hepato-splenomegaly   Pelvis: Clinical staff was present for exam  External genitalia:  normal appearance of the external genitalia including Bartholin's and [place]'s glands.  Vaginal:  normal pink mucosa without prolapse or lesions.  Cervix:  normal appearance.  Uterus:  normal size, shape and consistency. anteverted;  Adnexa:  non palpable bilaterally.  Rectal:  anus visually normal appearing. recto-vaginal exam unremarkable and confirms findings;     Lab Review   CMP results, TSH results- normal, and 25-OH, Vit. D is 27.9 ng/mL; Lipid panel reveals a total cholesterol of 247 and LDL of 130. results    Imaging  Mammogram results- Birads I, and DEXA- T-scores of the spine -1.6, of the right hip -1.7 and of the right femoral neck -2.4         ASSESSMENT  Problems Addressed this Visit        Genitourinary    Menopause - Primary    Vaginal atrophy       Other    Family history of ovarian cancer      Other Visit Diagnoses     Encounter for gynecological examination without abnormal finding        Relevant Orders    Liquid-based Pap Smear, Screening          PLAN    · Medications prescribed this encounter:  No orders of the defined types were placed in this encounter.  · Continued follow up at the [facility]  · Monthly self breast assessment and annual breast imaging  · Calcium, 600 mg/ Vit. D, 400 IU daily; regular weight-bearing exercise  · Follow up: 12 month(s)  *Please note that portions of this documentation may have been completed with a voice recognition program.  Efforts were made to edit this dictation, but occasional words may have been mistranscribed.       This note was electronically signed.    SHAYNE Walsh MD  September 24, 2018  3:40 PM

## 2018-09-26 ENCOUNTER — HOSPITAL ENCOUNTER (OUTPATIENT)
Dept: MAMMOGRAPHY | Facility: HOSPITAL | Age: 58
Discharge: HOME | End: 2018-09-26
Attending: OBSTETRICS & GYNECOLOGY | Admitting: OBSTETRICS & GYNECOLOGY

## 2018-09-26 DIAGNOSIS — Z12.31 VISIT FOR SCREENING MAMMOGRAM: ICD-10-CM

## 2018-09-26 PROCEDURE — 77063 BREAST TOMOSYNTHESIS BI: CPT

## 2018-09-26 PROCEDURE — 77067 SCR MAMMO BI INCL CAD: CPT | Performed by: RADIOLOGY

## 2018-09-26 PROCEDURE — 77067 SCR MAMMO BI INCL CAD: CPT

## 2018-09-26 PROCEDURE — 77063 BREAST TOMOSYNTHESIS BI: CPT | Performed by: RADIOLOGY

## 2019-06-07 ENCOUNTER — OFFICE VISIT (OUTPATIENT)
Dept: FAMILY MEDICINE CLINIC | Facility: CLINIC | Age: 59
End: 2019-06-07

## 2019-06-07 VITALS
SYSTOLIC BLOOD PRESSURE: 110 MMHG | DIASTOLIC BLOOD PRESSURE: 80 MMHG | HEART RATE: 80 BPM | RESPIRATION RATE: 18 BRPM | OXYGEN SATURATION: 98 % | BODY MASS INDEX: 20.8 KG/M2 | WEIGHT: 113 LBS | TEMPERATURE: 97.9 F | HEIGHT: 62 IN

## 2019-06-07 DIAGNOSIS — M81.6 LOCALIZED OSTEOPOROSIS WITHOUT CURRENT PATHOLOGICAL FRACTURE: ICD-10-CM

## 2019-06-07 DIAGNOSIS — Z00.00 ANNUAL PHYSICAL EXAM: Primary | ICD-10-CM

## 2019-06-07 LAB
ALBUMIN SERPL-MCNC: 4.4 G/DL (ref 3.5–5.2)
ALBUMIN/GLOB SERPL: 1.4 G/DL
ALP SERPL-CCNC: 41 U/L (ref 39–117)
ALT SERPL W P-5'-P-CCNC: 18 U/L (ref 1–33)
ANION GAP SERPL CALCULATED.3IONS-SCNC: 13.7 MMOL/L
AST SERPL-CCNC: 29 U/L (ref 1–32)
BASOPHILS # BLD AUTO: 0.02 10*3/MM3 (ref 0–0.2)
BASOPHILS NFR BLD AUTO: 0.5 % (ref 0–1.5)
BILIRUB BLD-MCNC: ABNORMAL MG/DL
BILIRUB SERPL-MCNC: 0.7 MG/DL (ref 0.2–1.2)
BUN BLD-MCNC: 12 MG/DL (ref 6–20)
BUN/CREAT SERPL: 12.1 (ref 7–25)
CALCIUM SPEC-SCNC: 9.1 MG/DL (ref 8.6–10.5)
CHLORIDE SERPL-SCNC: 102 MMOL/L (ref 98–107)
CHOLEST SERPL-MCNC: 251 MG/DL (ref 0–200)
CLARITY, POC: CLEAR
CO2 SERPL-SCNC: 26.3 MMOL/L (ref 22–29)
COLOR UR: YELLOW
CREAT BLD-MCNC: 0.99 MG/DL (ref 0.57–1)
DEPRECATED RDW RBC AUTO: 47.2 FL (ref 37–54)
EOSINOPHIL # BLD AUTO: 0.06 10*3/MM3 (ref 0–0.4)
EOSINOPHIL NFR BLD AUTO: 1.5 % (ref 0.3–6.2)
ERYTHROCYTE [DISTWIDTH] IN BLOOD BY AUTOMATED COUNT: 13.5 % (ref 12.3–15.4)
GFR SERPL CREATININE-BSD FRML MDRD: 58 ML/MIN/1.73
GLOBULIN UR ELPH-MCNC: 3.1 GM/DL
GLUCOSE BLD-MCNC: 91 MG/DL (ref 65–99)
GLUCOSE UR STRIP-MCNC: NEGATIVE MG/DL
HCT VFR BLD AUTO: 44.7 % (ref 34–46.6)
HDLC SERPL-MCNC: 104 MG/DL (ref 40–60)
HGB BLD-MCNC: 14.3 G/DL (ref 12–15.9)
IMM GRANULOCYTES # BLD AUTO: 0.01 10*3/MM3 (ref 0–0.05)
IMM GRANULOCYTES NFR BLD AUTO: 0.2 % (ref 0–0.5)
KETONES UR QL: ABNORMAL
LDLC SERPL CALC-MCNC: 134 MG/DL (ref 0–100)
LDLC/HDLC SERPL: 1.29 {RATIO}
LEUKOCYTE EST, POC: NEGATIVE
LYMPHOCYTES # BLD AUTO: 0.82 10*3/MM3 (ref 0.7–3.1)
LYMPHOCYTES NFR BLD AUTO: 19.9 % (ref 19.6–45.3)
MCH RBC QN AUTO: 30.2 PG (ref 26.6–33)
MCHC RBC AUTO-ENTMCNC: 32 G/DL (ref 31.5–35.7)
MCV RBC AUTO: 94.3 FL (ref 79–97)
MONOCYTES # BLD AUTO: 0.44 10*3/MM3 (ref 0.1–0.9)
MONOCYTES NFR BLD AUTO: 10.7 % (ref 5–12)
NEUTROPHILS # BLD AUTO: 2.78 10*3/MM3 (ref 1.7–7)
NEUTROPHILS NFR BLD AUTO: 67.2 % (ref 42.7–76)
NITRITE UR-MCNC: NEGATIVE MG/ML
NRBC BLD AUTO-RTO: 0 /100 WBC (ref 0–0.2)
PH UR: 7 [PH] (ref 5–8)
PLATELET # BLD AUTO: 150 10*3/MM3 (ref 140–450)
PMV BLD AUTO: 10.8 FL (ref 6–12)
POTASSIUM BLD-SCNC: 5 MMOL/L (ref 3.5–5.2)
PROT SERPL-MCNC: 7.5 G/DL (ref 6–8.5)
PROT UR STRIP-MCNC: ABNORMAL MG/DL
RBC # BLD AUTO: 4.74 10*6/MM3 (ref 3.77–5.28)
RBC # UR STRIP: NEGATIVE /UL
SODIUM BLD-SCNC: 142 MMOL/L (ref 136–145)
SP GR UR: 1.02 (ref 1–1.03)
TRIGL SERPL-MCNC: 63 MG/DL (ref 0–150)
TSH SERPL DL<=0.05 MIU/L-ACNC: 2.43 MIU/ML (ref 0.27–4.2)
UROBILINOGEN UR QL: NORMAL
VLDLC SERPL-MCNC: 12.6 MG/DL (ref 5–40)
WBC NRBC COR # BLD: 4.13 10*3/MM3 (ref 3.4–10.8)

## 2019-06-07 PROCEDURE — 82306 VITAMIN D 25 HYDROXY: CPT | Performed by: FAMILY MEDICINE

## 2019-06-07 PROCEDURE — 99386 PREV VISIT NEW AGE 40-64: CPT | Performed by: FAMILY MEDICINE

## 2019-06-07 PROCEDURE — 84443 ASSAY THYROID STIM HORMONE: CPT | Performed by: FAMILY MEDICINE

## 2019-06-07 PROCEDURE — 80061 LIPID PANEL: CPT | Performed by: FAMILY MEDICINE

## 2019-06-07 PROCEDURE — 81003 URINALYSIS AUTO W/O SCOPE: CPT | Performed by: FAMILY MEDICINE

## 2019-06-07 PROCEDURE — 85025 COMPLETE CBC W/AUTO DIFF WBC: CPT | Performed by: FAMILY MEDICINE

## 2019-06-07 PROCEDURE — 80053 COMPREHEN METABOLIC PANEL: CPT | Performed by: FAMILY MEDICINE

## 2019-06-07 NOTE — PROGRESS NOTES
Subjective   Kimberly Brito is a 58 y.o. female seen today for Annual Exam.     History of Present Illness   The patient is here for a physical exam.     She states she is doing well.  Denies any chest pain or shortness of breath.     Does not smoke.  Does use occasional alcohol.  Denies drug use.     Sees her eye doctor every two years. Wears reading glasses.  Sees her dentist twice a year.  Sees Dr Quezada with dermatology.  Sees Dr Tabares with Rheumatology.  Sees Dr Walsh for GYN exam and mammograms. Last exam was in September 2018. Last mammogram was 09/26/2018.  Last bone density was in Jan 2018 -Osteopenia.Taking Prolia injections twice a year.  Taking OTC Vitamin D in the amount of 3,000 IU daily.  Normal colonoscopy in 2016 with Dr Drake. To repeat in 5 years fromthat time.     She is up to date on immunizations.       The following portions of the patient's history were reviewed and updated as appropriate: allergies, current medications, past social history and problem list.    Review of Systems   Constitutional: Negative.    HENT: Negative.         Intermittent pressure ad tenderness in the right ear.     Eyes: Negative.    Respiratory: Negative.    Cardiovascular: Negative.    Gastrointestinal: Negative.    Endocrine: Positive for cold intolerance. Negative for heat intolerance, polydipsia, polyphagia and polyuria.   Genitourinary: Negative.    Musculoskeletal: Negative.    Skin: Negative.    Allergic/Immunologic: Positive for environmental allergies (cats). Negative for food allergies and immunocompromised state.   Neurological: Negative.    Hematological: Negative.    Psychiatric/Behavioral: Positive for sleep disturbance (occasional insomnia). Negative for agitation, behavioral problems, confusion, decreased concentration, dysphoric mood, hallucinations, self-injury and suicidal ideas. The patient is not nervous/anxious and is not hyperactive.        Objective   /80   Pulse 80   Temp 97.9 °F  "(36.6 °C)   Resp 18   Ht 157.5 cm (62\")   Wt 51.3 kg (113 lb)   LMP  (LMP Unknown)   SpO2 98%   BMI 20.67 kg/m²   Physical Exam   Constitutional: She is oriented to person, place, and time. She appears well-developed and well-nourished. No distress.   HENT:   Head: Normocephalic and atraumatic.   Right Ear: External ear normal.   Left Ear: External ear normal.   Nose: Nose normal.   Mouth/Throat: Oropharynx is clear and moist.   Eyes: Conjunctivae and EOM are normal. Pupils are equal, round, and reactive to light.   Neck: Normal range of motion. Neck supple. No thyromegaly present.   Cardiovascular: Normal rate, regular rhythm, normal heart sounds and intact distal pulses.   Pulmonary/Chest: Effort normal and breath sounds normal. No respiratory distress. She has no wheezes. She has no rales.   Abdominal: Soft. Bowel sounds are normal. There is no tenderness.   Musculoskeletal: Normal range of motion.   Neurological: She is alert and oriented to person, place, and time. She has normal reflexes.   Skin: Skin is warm and dry. She is not diaphoretic.   Psychiatric: She has a normal mood and affect. Her behavior is normal. Judgment and thought content normal.   Nursing note and vitals reviewed.      Assessment/Plan   Problem List Items Addressed This Visit        Musculoskeletal and Integument    Osteoporosis      Other Visit Diagnoses     Annual physical exam    -  Primary    Relevant Orders    POCT urinalysis dipstick, automated (Completed)              Drink plenty fluids.    Continue medications as doing.    Check a UA,CBC,CMP,Lipids,Vitamin D, and TSH. Report results by letter.    With regards to lab results. The patient is instructed to call the office if they have not received lab results with 2 weeks.    Schedule for a Prolia injection.    Follow up in 6 months. Sooner if needed.            We discussed with the patient the importance of maintaining a healthy weight by observing a diet that is low in " carbohydrates.  We also recommended avoiding consumption of high levels of sodium and caffeine to prevent hypertension. We recommended daily exercise and obtaining a weight with a BMI less than 26.  We also recommended avoiding the use of tobacco and alcohol.  We also recommended an annual physical with their primary care physician.                  Scribed for Dr Sushil Jj by Anu Chaparro CMA.          I, Sushil Jj MD, personally performed the services described in this documentation, as scribed by Anu Chaparro in my presence, and is both accurate and complete.        (Please note that portions of this note were completed with a voice recognition program. Efforts were made to edit the dictations,but occasionally words are mis transcribed.)

## 2019-06-08 LAB — 25(OH)D3 SERPL-MCNC: 67.2 NG/ML (ref 30–100)

## 2019-07-02 ENCOUNTER — TELEPHONE (OUTPATIENT)
Dept: FAMILY MEDICINE CLINIC | Facility: CLINIC | Age: 59
End: 2019-07-02

## 2019-07-02 NOTE — TELEPHONE ENCOUNTER
----- Message from Sharmin Cotton sent at 7/1/2019  1:00 PM EDT -----  Regarding: PROLIA  Contact: [phone]  Patient came in the office to see if her Prolia had ever been ordered from 6/7/19.  Please call and advise  @ [phone].  Thank you.     Paperwork was sent bact to office with missing information, corrected this and faxed back.  EDUARDO Lackey

## 2019-08-02 ENCOUNTER — TELEPHONE (OUTPATIENT)
Dept: FAMILY MEDICINE CLINIC | Facility: CLINIC | Age: 59
End: 2019-08-02

## 2019-09-30 RX ORDER — AMOXICILLIN 500 MG/1
500 CAPSULE ORAL 2 TIMES DAILY
Qty: 20 CAPSULE | Refills: 0 | Status: SHIPPED | OUTPATIENT
Start: 2019-09-30 | End: 2019-10-14

## 2019-10-14 ENCOUNTER — TRANSCRIBE ORDERS (OUTPATIENT)
Dept: ADMINISTRATIVE | Facility: HOSPITAL | Age: 59
End: 2019-10-14

## 2019-10-14 ENCOUNTER — OFFICE VISIT (OUTPATIENT)
Dept: OBSTETRICS AND GYNECOLOGY | Facility: CLINIC | Age: 59
End: 2019-10-14

## 2019-10-14 VITALS
SYSTOLIC BLOOD PRESSURE: 102 MMHG | HEIGHT: 62 IN | BODY MASS INDEX: 20.8 KG/M2 | WEIGHT: 113 LBS | DIASTOLIC BLOOD PRESSURE: 64 MMHG

## 2019-10-14 DIAGNOSIS — Z80.3 FAMILY HISTORY OF BREAST CANCER IN MOTHER: ICD-10-CM

## 2019-10-14 DIAGNOSIS — N95.2 VAGINAL ATROPHY: ICD-10-CM

## 2019-10-14 DIAGNOSIS — Z12.31 VISIT FOR SCREENING MAMMOGRAM: Primary | ICD-10-CM

## 2019-10-14 DIAGNOSIS — Z01.419 ENCOUNTER FOR GYNECOLOGICAL EXAMINATION WITHOUT ABNORMAL FINDING: ICD-10-CM

## 2019-10-14 DIAGNOSIS — Z78.0 MENOPAUSE: Primary | ICD-10-CM

## 2019-10-14 PROCEDURE — 99396 PREV VISIT EST AGE 40-64: CPT | Performed by: OBSTETRICS & GYNECOLOGY

## 2019-10-14 NOTE — PROGRESS NOTES
Chief Complaint   Patient presents with   • Gynecologic Exam       Kimberly Brito is a 58 y.o. year old  presenting to be seen for her annual exam.  This patient is menopausal and does not take estrogen/progestin replacement therapy.  She has had benign breast imaging.  She has a history of osteoporosis and has been treated with Prolia subcutaneous injections with improvement at the spine and hip.  Her last DEXA was consistent with osteopenia.  She denies bowel or urinary symptoms.  She has no postmenopausal bleeding.    SCREENING TESTS    Year 2012   Age                         PAP                         HPV high risk                         Mammogram       benign                  DAMON score                         Breast MRI                         Lipids                         Vitamin D                         Colonoscopy                         DEXA  Frax (hip/any)       osteopenia                  Ovarian Screen                             She exercises regularly: yes.  She wears her seat belt: yes.  She has concerns about domestic violence: no.  She has noticed changes in height: no    GYN screening history:  · Last mammogram: was done on approximately 2018 and the result was: Birads I (Normal).  · Last DEXA: was done on approximately 2018 and the results were: osteopenia of hip, osteopenia of spine and osteopenia of the femoral neck.    No Additional Complaints Reported    The following portions of the patient's history were reviewed and updated as appropriate:vital signs and   She  has a past medical history of Acute sinusitis, Family history of breast cancer in mother, Family history of ovarian cancer, Melanoma of hip (CMS/HCC), Menopause, Osteoporosis, and Rotator cuff tendinitis.  She does not have any pertinent problems on file.  She  has a past surgical history that includes  "Excision Lesion (Left); Colonoscopy (11/2012); and Breast biopsy (Left, 10/14/2008).  Her family history includes Breast cancer (age of onset: 75) in her mother; Colon cancer in her mother; Diabetes in her brother and father; Heart disease in her brother and maternal grandfather; Lung cancer in her father; Ovarian cancer in her maternal aunt; Stroke in her maternal grandmother.  She  reports that she has never smoked. She has never used smokeless tobacco. She reports that she drinks about 8.4 oz of alcohol per week. She reports that she does not use drugs.  Current Outpatient Medications   Medication Sig Dispense Refill   • cholecalciferol (VITAMIN D3) 1000 units tablet Take 2,000 Units by mouth Daily.     • denosumab (PROLIA) 60 MG/ML solution syringe Inject 1 dose under the skin every 6 (six) months.     • Multiple Vitamins-Minerals (MULTIVITAMIN ADULT PO) Take 1 tablet by mouth Daily.       No current facility-administered medications for this visit.      She is allergic to alendronate..    Review of Systems  A comprehensive review of systems was taken.  Constitutional: negative for fever, chills, activity change, appetite change, fatigue and unexpected weight change.  Respiratory: negative  Cardiovascular: negative  Gastrointestinal: negative  Genitourinary:negative  Musculoskeletal:negative  Behavioral/Psych: negative       /64   Ht 157.5 cm (62\")   Wt 51.3 kg (113 lb)   LMP  (LMP Unknown)   Breastfeeding? No   BMI 20.67 kg/m²     Physical Exam    General:  alert; cooperative; well developed; well nourished   Skin:  No suspicious lesions seen   Thyroid: normal to inspection and palpation   Lungs:  clear to auscultation bilaterally   Heart:  regular rate and rhythm, S1, S2 normal, no murmur, click, rub or gallop   Breasts:  Examined in supine position  Symmetric without masses or skin dimpling  Nipples normal without inversion, lesions or discharge  There are no palpable axillary nodes   Abdomen: " soft, non-tender; no masses  no umbilical or inguinal hernias are present  no hepato-splenomegaly   Pelvis: Clinical staff was present for exam  External genitalia:  normal appearance of the external genitalia including Bartholin's and [place]'s glands.  Vaginal:  normal pink mucosa without prolapse or lesions.  Cervix:  normal appearance.  Uterus:  normal size, shape and consistency. anteverted;  Adnexa:  non palpable bilaterally.  Rectal:  anus visually normal appearing. recto-vaginal exam unremarkable and confirms findings;     Lab Review   No data reviewed    Imaging  Mammogram results- Birads I, and DEXA results- moderate osteopenia of the spine and right hip and severe osteopenia of the right femoral  neck         ASSESSMENT  Problems Addressed this Visit        Genitourinary    Menopause - Primary    Vaginal atrophy       Other    Family history of breast cancer in mother      Other Visit Diagnoses     Encounter for gynecological examination without abnormal finding        Relevant Orders    Liquid-based Pap Smear, Screening              Substance History:   reports that she has never smoked. She has never used smokeless tobacco.   reports that she drinks about 8.4 oz of alcohol per week.   reports that she does not use drugs.    Substance use counseling is not indicated based on patient history. Alcohol intake is social.      PLAN    · Medications prescribed this encounter:  No orders of the defined types were placed in this encounter.  · Monthly self breast assessment and annual breast imaging  · Rheumatology follow-up for Prolia injection  · Calcium, 600 mg/ Vit. D, 400 IU daily; regular weight-bearing exercise  · Follow up: 12 month(s)  *Please note that portions of this documentation may have been completed with a voice recognition program.  Efforts were made to edit this dictation, but occasional words may have been mistranscribed.       This note was electronically signed.    SHAYNE Walsh MD  October  14, 2019  3:05 PM

## 2019-12-26 ENCOUNTER — TELEPHONE (OUTPATIENT)
Dept: FAMILY MEDICINE CLINIC | Facility: CLINIC | Age: 59
End: 2019-12-26

## 2019-12-26 ENCOUNTER — APPOINTMENT (OUTPATIENT)
Dept: MAMMOGRAPHY | Facility: HOSPITAL | Age: 59
End: 2019-12-26

## 2019-12-26 NOTE — TELEPHONE ENCOUNTER
Pt called in stating that her  tested positive for the flu and she believes she may also have it. Pt would like to know if she can be prescribed Tamiflu.    [address] Confirmed    Pt call back [phone]

## 2020-02-13 ENCOUNTER — HOSPITAL ENCOUNTER (OUTPATIENT)
Dept: MAMMOGRAPHY | Facility: HOSPITAL | Age: 60
Discharge: HOME | End: 2020-02-13
Admitting: OBSTETRICS & GYNECOLOGY

## 2020-02-13 DIAGNOSIS — Z12.31 VISIT FOR SCREENING MAMMOGRAM: ICD-10-CM

## 2020-02-13 PROCEDURE — 77067 SCR MAMMO BI INCL CAD: CPT | Performed by: RADIOLOGY

## 2020-02-13 PROCEDURE — 77063 BREAST TOMOSYNTHESIS BI: CPT

## 2020-02-13 PROCEDURE — 77067 SCR MAMMO BI INCL CAD: CPT

## 2020-02-13 PROCEDURE — 77063 BREAST TOMOSYNTHESIS BI: CPT | Performed by: RADIOLOGY

## 2020-02-18 ENCOUNTER — TELEPHONE (OUTPATIENT)
Dept: OBSTETRICS AND GYNECOLOGY | Facility: CLINIC | Age: 60
End: 2020-02-18

## 2020-02-18 NOTE — TELEPHONE ENCOUNTER
I called the patient to let her know that her mammogram was BI-RADS 1 (-).  She did not answer and I left a message.  Her mother was diagnosed with breast cancer at 75 years of age and her Skyla model risk for malignancy of the breast is 17%.  I have informed the patient of this and given her the option of having an intermittent MRI of the breast done.  She will contact me if she desires this.  Otherwise, she will continue with monthly self breast assessment, annual exam in the office, and annual breast imaging.

## 2020-06-08 ENCOUNTER — OFFICE VISIT (OUTPATIENT)
Dept: FAMILY MEDICINE CLINIC | Facility: CLINIC | Age: 60
End: 2020-06-08

## 2020-06-08 VITALS
BODY MASS INDEX: 20.8 KG/M2 | RESPIRATION RATE: 18 BRPM | HEIGHT: 62 IN | TEMPERATURE: 97.2 F | HEART RATE: 80 BPM | WEIGHT: 113 LBS | DIASTOLIC BLOOD PRESSURE: 78 MMHG | OXYGEN SATURATION: 99 % | SYSTOLIC BLOOD PRESSURE: 110 MMHG

## 2020-06-08 DIAGNOSIS — F41.9 ANXIETY: ICD-10-CM

## 2020-06-08 DIAGNOSIS — Z00.00 ANNUAL PHYSICAL EXAM: Primary | ICD-10-CM

## 2020-06-08 DIAGNOSIS — Z13.220 ENCOUNTER FOR LIPID SCREENING FOR CARDIOVASCULAR DISEASE: ICD-10-CM

## 2020-06-08 DIAGNOSIS — Z13.6 ENCOUNTER FOR LIPID SCREENING FOR CARDIOVASCULAR DISEASE: ICD-10-CM

## 2020-06-08 LAB
25(OH)D3 SERPL-MCNC: 50.2 NG/ML (ref 30–100)
ALBUMIN SERPL-MCNC: 4.7 G/DL (ref 3.5–5.2)
ALBUMIN/GLOB SERPL: 1.6 G/DL
ALP SERPL-CCNC: 38 U/L (ref 39–117)
ALT SERPL W P-5'-P-CCNC: 16 U/L (ref 1–33)
ANION GAP SERPL CALCULATED.3IONS-SCNC: 12.1 MMOL/L (ref 5–15)
AST SERPL-CCNC: 32 U/L (ref 1–32)
BASOPHILS # BLD AUTO: 0.01 10*3/MM3 (ref 0–0.2)
BASOPHILS NFR BLD AUTO: 0.2 % (ref 0–1.5)
BILIRUB BLD-MCNC: NEGATIVE MG/DL
BILIRUB SERPL-MCNC: 0.7 MG/DL (ref 0.2–1.2)
BUN BLD-MCNC: 11 MG/DL (ref 6–20)
BUN/CREAT SERPL: 11.8 (ref 7–25)
CALCIUM SPEC-SCNC: 9.2 MG/DL (ref 8.6–10.5)
CHLORIDE SERPL-SCNC: 104 MMOL/L (ref 98–107)
CHOLEST SERPL-MCNC: 234 MG/DL (ref 0–200)
CLARITY, POC: CLEAR
CO2 SERPL-SCNC: 23.9 MMOL/L (ref 22–29)
COLOR UR: YELLOW
CREAT BLD-MCNC: 0.93 MG/DL (ref 0.57–1)
DEPRECATED RDW RBC AUTO: 41.2 FL (ref 37–54)
EOSINOPHIL # BLD AUTO: 0.09 10*3/MM3 (ref 0–0.4)
EOSINOPHIL NFR BLD AUTO: 1.9 % (ref 0.3–6.2)
ERYTHROCYTE [DISTWIDTH] IN BLOOD BY AUTOMATED COUNT: 12.7 % (ref 12.3–15.4)
GFR SERPL CREATININE-BSD FRML MDRD: 62 ML/MIN/1.73
GLOBULIN UR ELPH-MCNC: 2.9 GM/DL
GLUCOSE BLD-MCNC: 94 MG/DL (ref 65–99)
GLUCOSE UR STRIP-MCNC: NEGATIVE MG/DL
HCT VFR BLD AUTO: 41.6 % (ref 34–46.6)
HDLC SERPL-MCNC: 92 MG/DL (ref 40–60)
HGB BLD-MCNC: 14.2 G/DL (ref 12–15.9)
IMM GRANULOCYTES # BLD AUTO: 0.01 10*3/MM3 (ref 0–0.05)
IMM GRANULOCYTES NFR BLD AUTO: 0.2 % (ref 0–0.5)
KETONES UR QL: NEGATIVE
LDLC SERPL CALC-MCNC: 126 MG/DL (ref 0–100)
LDLC/HDLC SERPL: 1.37 {RATIO}
LEUKOCYTE EST, POC: NEGATIVE
LYMPHOCYTES # BLD AUTO: 0.96 10*3/MM3 (ref 0.7–3.1)
LYMPHOCYTES NFR BLD AUTO: 20.3 % (ref 19.6–45.3)
MCH RBC QN AUTO: 30.7 PG (ref 26.6–33)
MCHC RBC AUTO-ENTMCNC: 34.1 G/DL (ref 31.5–35.7)
MCV RBC AUTO: 89.8 FL (ref 79–97)
MONOCYTES # BLD AUTO: 0.43 10*3/MM3 (ref 0.1–0.9)
MONOCYTES NFR BLD AUTO: 9.1 % (ref 5–12)
NEUTROPHILS # BLD AUTO: 3.22 10*3/MM3 (ref 1.7–7)
NEUTROPHILS NFR BLD AUTO: 68.3 % (ref 42.7–76)
NITRITE UR-MCNC: NEGATIVE MG/ML
NRBC BLD AUTO-RTO: 0 /100 WBC (ref 0–0.2)
PH UR: 6 [PH] (ref 5–8)
PLATELET # BLD AUTO: 169 10*3/MM3 (ref 140–450)
PMV BLD AUTO: 10.7 FL (ref 6–12)
POTASSIUM BLD-SCNC: 5.2 MMOL/L (ref 3.5–5.2)
PROT SERPL-MCNC: 7.6 G/DL (ref 6–8.5)
PROT UR STRIP-MCNC: NEGATIVE MG/DL
RBC # BLD AUTO: 4.63 10*6/MM3 (ref 3.77–5.28)
RBC # UR STRIP: NEGATIVE /UL
SODIUM BLD-SCNC: 140 MMOL/L (ref 136–145)
SP GR UR: 1.03 (ref 1–1.03)
TRIGL SERPL-MCNC: 82 MG/DL (ref 0–150)
TSH SERPL DL<=0.05 MIU/L-ACNC: 1.93 UIU/ML (ref 0.27–4.2)
UROBILINOGEN UR QL: NORMAL
VLDLC SERPL-MCNC: 16.4 MG/DL (ref 5–40)
WBC NRBC COR # BLD: 4.72 10*3/MM3 (ref 3.4–10.8)

## 2020-06-08 PROCEDURE — 80061 LIPID PANEL: CPT | Performed by: FAMILY MEDICINE

## 2020-06-08 PROCEDURE — 99396 PREV VISIT EST AGE 40-64: CPT | Performed by: FAMILY MEDICINE

## 2020-06-08 PROCEDURE — 82306 VITAMIN D 25 HYDROXY: CPT | Performed by: FAMILY MEDICINE

## 2020-06-08 PROCEDURE — 36415 COLL VENOUS BLD VENIPUNCTURE: CPT | Performed by: FAMILY MEDICINE

## 2020-06-08 PROCEDURE — 93000 ELECTROCARDIOGRAM COMPLETE: CPT | Performed by: FAMILY MEDICINE

## 2020-06-08 PROCEDURE — 85025 COMPLETE CBC W/AUTO DIFF WBC: CPT | Performed by: FAMILY MEDICINE

## 2020-06-08 PROCEDURE — 80053 COMPREHEN METABOLIC PANEL: CPT | Performed by: FAMILY MEDICINE

## 2020-06-08 PROCEDURE — 84443 ASSAY THYROID STIM HORMONE: CPT | Performed by: FAMILY MEDICINE

## 2020-06-08 PROCEDURE — 81003 URINALYSIS AUTO W/O SCOPE: CPT | Performed by: FAMILY MEDICINE

## 2020-06-08 RX ORDER — LORAZEPAM 1 MG/1
1 TABLET ORAL NIGHTLY
Qty: 15 TABLET | Refills: 0 | Status: SHIPPED | OUTPATIENT
Start: 2020-06-08 | End: 2020-10-19

## 2020-06-08 NOTE — PROGRESS NOTES
"Subjective   Kimberly Brito is a 59 y.o. female seen today for Annual Exam.     History of Present Illness   The patient is here for a physical exam.    States she is doing well.  Denies any chest pain or shortness of breath.  Does c/o a tightness in her throat that is worse at night when lying down.    Does not smoke.  Uses occasional alcohol.  Denies drug use.    Sees an eye doctor every 2 years.  Sees a dentist twice a year.  Sees Dr Quezada with dermatology.  Sees Dr Tabares with Rheumatology.  Sees Dr Walsh for GYN exam and mammograms. Last exam was 10/14/2019. Last mammogram was 02/13/2020.    Last colonoscopy was 2016 with Dr Drake. To repeat next year.    Immunizations are up to date.      The following portions of the patient's history were reviewed and updated as appropriate: allergies, current medications, past social history and problem list.    Review of Systems   Constitutional: Negative.    HENT: Negative.         Tightness in throat     Eyes: Negative.    Respiratory: Negative.    Cardiovascular: Negative.    Gastrointestinal: Negative.    Endocrine: Positive for cold intolerance. Negative for heat intolerance, polydipsia, polyphagia and polyuria.   Genitourinary: Negative.    Musculoskeletal: Negative.    Skin: Negative.    Allergic/Immunologic: Negative.    Neurological: Negative.    Hematological: Negative.    Psychiatric/Behavioral: Positive for sleep disturbance (wake up a lot.). Negative for agitation, behavioral problems, confusion, decreased concentration, dysphoric mood, hallucinations, self-injury and suicidal ideas. The patient is not nervous/anxious and is not hyperactive.        Objective   /78   Pulse 80   Temp 97.2 °F (36.2 °C)   Resp 18   Ht 157.5 cm (62\")   Wt 51.3 kg (113 lb)   LMP  (LMP Unknown)   SpO2 99%   BMI 20.67 kg/m²   Physical Exam   Constitutional: She is oriented to person, place, and time. She appears well-developed and well-nourished. No distress.   HENT: "   Head: Normocephalic and atraumatic.   Right Ear: External ear normal.   Left Ear: External ear normal.   Nose: Nose normal.   Mouth/Throat: Oropharynx is clear and moist.   Eyes: Pupils are equal, round, and reactive to light. Conjunctivae and EOM are normal.   Neck: Normal range of motion. Neck supple. No thyromegaly present.   Cardiovascular: Normal rate, regular rhythm, normal heart sounds and intact distal pulses.   Pulmonary/Chest: Effort normal and breath sounds normal. No respiratory distress. She has no wheezes. She has no rales.   Abdominal: Soft. Bowel sounds are normal. There is no tenderness.   Musculoskeletal: Normal range of motion.   Neurological: She is alert and oriented to person, place, and time. She has normal reflexes.   Skin: Skin is warm and dry. She is not diaphoretic.   Psychiatric: She has a normal mood and affect. Her behavior is normal. Judgment and thought content normal.   Nursing note and vitals reviewed.      ECG 12 Lead  Date/Time: 6/8/2020 9:35 AM  Performed by: Sushil Jj MD  Authorized by: Sushil Jj MD   Comparison: not compared with previous ECG   Rhythm: sinus bradycardia  Rate: bradycardic  BPM: 57  Conduction: conduction normal  ST Segments: ST segments normal  T Waves: T waves normal  QRS axis: normal  Other: no other findings    Clinical impression: normal ECG            Assessment/Plan   Problem List Items Addressed This Visit     None      Visit Diagnoses     Annual physical exam    -  Primary    Relevant Orders    POCT urinalysis dipstick, automated (Completed)              Drink plenty fluids.    Continue medications as doing.    Check a UA,CBC,CMP,Lipids,Vitamin D, and TSH. Report results by letter.    With regards to lab results. The patient is instructed to call the office if they have not received lab results with 2 weeks.    Follow up in 1 year. Sooner if needed.    Call in January for a screening colonoscopy with Dr Aldrich.        We discussed with  the patient the importance of maintaining a healthy weight by observing a diet that is low in carbohydrates.  We also recommended avoiding consumption of high levels of sodium and caffeine to prevent hypertension. We recommended daily exercise and obtaining a weight with a BMI less than 26.  We also recommended avoiding the use of tobacco and alcohol.  We also recommended an annual physical with their primary care physician.            Scribed for Dr Sushil Jj by nAu Chaparro CMA.          I, Sushil Jj MD, personally performed the services described in this documentation, as scribed by Anu Chaparro in my presence, and is both accurate and complete.        (Please note that portions of this note were completed with a voice recognition program. Efforts were made to edit the dictations,but occasionally words are mis transcribed.)

## 2020-06-29 ENCOUNTER — TELEPHONE (OUTPATIENT)
Dept: FAMILY MEDICINE CLINIC | Facility: CLINIC | Age: 60
End: 2020-06-29

## 2020-06-29 DIAGNOSIS — M81.0 AGE-RELATED OSTEOPOROSIS WITHOUT CURRENT PATHOLOGICAL FRACTURE: Primary | ICD-10-CM

## 2020-06-29 NOTE — TELEPHONE ENCOUNTER
----- Message from Anu Chaparro MA sent at 6/29/2020  2:33 PM EDT -----  Regarding: FW: REFERRAL   Contact: [phone]  Cassandra would you see if she an rx for this or a pre auth/Jjcma.  ----- Message -----  From: Ely Eldridge  Sent: 6/29/2020  12:49 PM EDT  To: Anu Chaparro MA  Subject: REFERRAL                                         PT GETS PROLIA INJECTION AT [facility]. THEY GOT A NOTICE FROM PT INSURANCE THAT SHE WILL NEED A REFERRAL FROM PCP FOR THEM TO COVER THIS. WILL DR TORRES PLACE THE REFERRAL AND I CAN FAX IT TO THEIR OFFICE?     Left msg for pt to call back to discuss this.  Darya, CMA

## 2020-10-19 ENCOUNTER — OFFICE VISIT (OUTPATIENT)
Dept: OBSTETRICS AND GYNECOLOGY | Facility: CLINIC | Age: 60
End: 2020-10-19

## 2020-10-19 VITALS
SYSTOLIC BLOOD PRESSURE: 108 MMHG | BODY MASS INDEX: 20.87 KG/M2 | DIASTOLIC BLOOD PRESSURE: 72 MMHG | WEIGHT: 113.4 LBS | HEIGHT: 62 IN

## 2020-10-19 DIAGNOSIS — Z80.41 FAMILY HISTORY OF OVARIAN CANCER: ICD-10-CM

## 2020-10-19 DIAGNOSIS — M81.0 AGE-RELATED OSTEOPOROSIS WITHOUT CURRENT PATHOLOGICAL FRACTURE: ICD-10-CM

## 2020-10-19 DIAGNOSIS — N95.2 VAGINAL ATROPHY: ICD-10-CM

## 2020-10-19 DIAGNOSIS — Z78.0 MENOPAUSE: Primary | ICD-10-CM

## 2020-10-19 DIAGNOSIS — Z80.3 FAMILY HISTORY OF BREAST CANCER IN MOTHER: ICD-10-CM

## 2020-10-19 DIAGNOSIS — Z01.419 ENCOUNTER FOR GYNECOLOGICAL EXAMINATION WITHOUT ABNORMAL FINDING: ICD-10-CM

## 2020-10-19 PROCEDURE — 99396 PREV VISIT EST AGE 40-64: CPT | Performed by: OBSTETRICS & GYNECOLOGY

## 2020-10-19 NOTE — PROGRESS NOTES
Chief Complaint   Patient presents with   • Annual Exam       Kimberly Brito is a 59 y.o. year old  presenting to be seen for her annual exam.  This patient is menopausal and does not use estrogen/progestin replacement therapy.  She has a history of osteoporosis of the spine and hips.  She has had none Prolia subcutaneous injections.  Her insurance did not approve her medication in the year of 2019 and she reverted back from severe osteopenia to osteoporosis.  She has had no atraumatic fractures.  She denies bowel or urinary symptoms.    SCREENING TESTS    Year 2012   Age                         PAP        Neg.                 HPV high risk                         Mammogram        benign benign                DAMON score                         Breast MRI                         Lipids                         Vitamin D                         Colonoscopy                         DEXA  Frax (hip/any)       osteopeniq  osteoporosis                Ovarian Screen                             She exercises regularly: yes.  She wears her seat belt: yes.  She has concerns about domestic violence: no.  She has noticed changes in height: no    GYN screening history:  · Last mammogram: was done on approximately 2020 and the result was: Birads I (Normal).  · Last DEXA: was done on approximately  and the results were: osteopenia of spine and osteoporosis of hips.    No Additional Complaints Reported    The following portions of the patient's history were reviewed and updated as appropriate:vital signs and   She  has a past medical history of Acute sinusitis, Family history of breast cancer in mother, Family history of ovarian cancer, Melanoma of hip (CMS/[facility]), Menopause, Osteoporosis, and Rotator cuff tendinitis.  She does not have any pertinent problems on file.  She  has a past surgical history that includes  "Excision Lesion (Left); Colonoscopy (11/2012); and Breast biopsy (Left, 10/14/2008).  Her family history includes Breast cancer (age of onset: 75) in her mother; Colon cancer in her mother; Diabetes in her brother and father; Heart disease in her brother and maternal grandfather; Lung cancer in her father; Ovarian cancer in her maternal aunt; Stroke in her maternal grandmother.  She  reports that she has never smoked. She has never used smokeless tobacco. She reports current alcohol use of about 14.0 standard drinks of alcohol per week. She reports that she does not use drugs.  Current Outpatient Medications   Medication Sig Dispense Refill   • calcium acetate (PHOSLO) 667 MG capsule Take 1,334 mg by mouth 3 (Three) Times a Day With Meals.     • cholecalciferol (VITAMIN D3) 1000 units tablet Take 2,000 Units by mouth Daily.     • denosumab (PROLIA) 60 MG/ML solution syringe Inject 1 dose under the skin every 6 (six) months.     • Multiple Vitamins-Minerals (MULTIVITAMIN ADULT PO) Take 1 tablet by mouth Daily.       No current facility-administered medications for this visit.      She has No Known Allergies..    Review of Systems  A review of systems was taken.  She denies cough, fever, shortness of breath, and loss of her sense of taste or smell.  Constitutional: negative for fever, chills, activity change, appetite change, fatigue and unexpected weight change.  Respiratory: negative  Cardiovascular: negative  Gastrointestinal: negative  Genitourinary:negative  Musculoskeletal:negative  Behavioral/Psych: negative       /72   Ht 157.5 cm (62\")   Wt 51.4 kg (113 lb 6.4 oz)   LMP  (LMP Unknown)   Breastfeeding No   BMI 20.74 kg/m²     Physical Exam    General:  alert; cooperative; well developed; well nourished   Skin:  No suspicious lesions seen   Thyroid: normal to inspection and palpation   Lungs:  clear to auscultation bilaterally   Heart:  regular rate and rhythm, S1, S2 normal, no murmur, click, rub or " gallop   Breasts:  Examined in supine position  Symmetric without masses or skin dimpling  Nipples normal without inversion, lesions or discharge  There are no palpable axillary nodes   Abdomen: soft, non-tender; no masses  no umbilical or inguinal hernias are present  no hepato-splenomegaly   Pelvis: Clinical staff was present for exam  External genitalia:  normal appearance of the external genitalia including Bartholin's and [place]'s glands.  Vaginal:  atrophic mucosal changes are present;  Cervix:  normal appearance.  Uterus:  normal size, shape and consistency. anteverted;  Adnexa:  non palpable bilaterally.  Rectal:  anus visually normal appearing. recto-vaginal exam unremarkable and confirms findings;     Lab Review   No data reviewed    Imaging  Mammogram results and DEXA          Advance Directives- NO (the patient was given a pamphlet about diagnosing advanced directive/living will.)    ASSESSMENT  Problems Addressed this Visit        Musculoskeletal and Integument    Osteoporosis       Genitourinary    Menopause - Primary    Vaginal atrophy       Other    Family history of ovarian cancer    Family history of breast cancer in mother      Other Visit Diagnoses     Encounter for gynecological examination without abnormal finding          Diagnoses       Codes Comments    Menopause    -  Primary ICD-10-CM: Z78.0  ICD-9-CM: 627.2     Vaginal atrophy     ICD-10-CM: N95.2  ICD-9-CM: 627.3     Family history of ovarian cancer     ICD-10-CM: Z80.41  ICD-9-CM: V16.41     Family history of breast cancer in mother     ICD-10-CM: Z80.3  ICD-9-CM: V16.3     Age-related osteoporosis without current pathological fracture     ICD-10-CM: M81.0  ICD-9-CM: 733.01     Encounter for gynecological examination without abnormal finding     ICD-10-CM: Z01.419  ICD-9-CM: V72.31               Substance History:   reports that she has never smoked. She has never used smokeless tobacco.   reports current alcohol use of about 14.0  standard drinks of alcohol per week.   reports no history of drug use.    Substance use counseling is not indicated based on patient history.  I have encouraged the patient to decrease her alcohol intake      PLAN    · Medications prescribed this encounter:  No orders of the defined types were placed in this encounter.  · Monthly self breast assessment and annual breast imaging  · Pap test done  · Pamphlet about advanced directives  · Calcium, 600 mg/ Vit. D, 400 IU daily; regular weight-bearing exercise  · Follow up: 12 month(s)  *Please note that portions of this documentation may have been completed with a voice recognition program.  Efforts were made to edit this dictation, but occasional words may have been mistranscribed.       This note was electronically signed.    SHAYNE Walsh MD  October 19, 2020  14:01 EDT

## 2020-12-02 ENCOUNTER — TELEPHONE (OUTPATIENT)
Dept: FAMILY MEDICINE CLINIC | Facility: CLINIC | Age: 60
End: 2020-12-02

## 2020-12-02 NOTE — TELEPHONE ENCOUNTER
Patient was exposed to Covid yesterday and has some questions about her next steps, especially on how to quarantine from her .     [phone]

## 2020-12-03 NOTE — TELEPHONE ENCOUNTER
I spoke to the patient on the telephone.  She had exposure to her hairdresser.  Both were wearing masks at the time of her hairdressing appointment.  There was a fairly lengthy appointment.  Patient has had no symptoms.  I agreed with the patient that we should wait 5 days before we do testing.  The patient will call me back on Monday to get testing ordered at that time, and we will evaluate regarding the symptoms and also whether to get the test done at that point.    All spoke to the patient's .  He has a history of IHSS.  He has normal heart function and I did not consider him at any higher risk than anyone else in his age group.

## 2021-03-16 ENCOUNTER — TRANSCRIBE ORDERS (OUTPATIENT)
Dept: ADMINISTRATIVE | Facility: HOSPITAL | Age: 61
End: 2021-03-16

## 2021-03-16 DIAGNOSIS — Z12.31 VISIT FOR SCREENING MAMMOGRAM: Primary | ICD-10-CM

## 2021-03-22 ENCOUNTER — HOSPITAL ENCOUNTER (OUTPATIENT)
Dept: MAMMOGRAPHY | Facility: HOSPITAL | Age: 61
Discharge: HOME | End: 2021-03-22
Admitting: OBSTETRICS & GYNECOLOGY

## 2021-03-22 DIAGNOSIS — Z12.31 VISIT FOR SCREENING MAMMOGRAM: ICD-10-CM

## 2021-03-22 PROCEDURE — 77067 SCR MAMMO BI INCL CAD: CPT | Performed by: RADIOLOGY

## 2021-03-22 PROCEDURE — 77063 BREAST TOMOSYNTHESIS BI: CPT | Performed by: RADIOLOGY

## 2021-03-22 PROCEDURE — 77063 BREAST TOMOSYNTHESIS BI: CPT

## 2021-03-22 PROCEDURE — 77067 SCR MAMMO BI INCL CAD: CPT

## 2021-04-12 ENCOUNTER — IMMUNIZATION (OUTPATIENT)
Dept: VACCINE CLINIC | Facility: HOSPITAL | Age: 61
End: 2021-04-12

## 2021-04-12 PROCEDURE — 91300 HC SARSCOV02 VAC 30MCG/0.3ML IM: CPT | Performed by: INTERNAL MEDICINE

## 2021-04-12 PROCEDURE — 0001A: CPT | Performed by: INTERNAL MEDICINE

## 2021-05-03 ENCOUNTER — IMMUNIZATION (OUTPATIENT)
Dept: VACCINE CLINIC | Facility: HOSPITAL | Age: 61
End: 2021-05-03

## 2021-05-03 PROCEDURE — 91300 HC SARSCOV02 VAC 30MCG/0.3ML IM: CPT | Performed by: INTERNAL MEDICINE

## 2021-05-03 PROCEDURE — 0002A: CPT | Performed by: INTERNAL MEDICINE

## 2021-05-17 DIAGNOSIS — Z12.11 SCREENING FOR COLON CANCER: Primary | ICD-10-CM

## 2021-05-23 ENCOUNTER — APPOINTMENT (OUTPATIENT)
Dept: PREADMISSION TESTING | Facility: HOSPITAL | Age: 61
End: 2021-05-23

## 2021-05-26 ENCOUNTER — OUTSIDE FACILITY SERVICE (OUTPATIENT)
Dept: GASTROENTEROLOGY | Facility: CLINIC | Age: 61
End: 2021-05-26

## 2021-05-26 PROCEDURE — 45385 COLONOSCOPY W/LESION REMOVAL: CPT | Performed by: INTERNAL MEDICINE

## 2021-05-26 PROCEDURE — 88305 TISSUE EXAM BY PATHOLOGIST: CPT | Performed by: INTERNAL MEDICINE

## 2021-05-27 ENCOUNTER — LAB REQUISITION (OUTPATIENT)
Dept: LAB | Facility: HOSPITAL | Age: 61
End: 2021-05-27

## 2021-05-27 DIAGNOSIS — Z12.11 ENCOUNTER FOR SCREENING FOR MALIGNANT NEOPLASM OF COLON: ICD-10-CM

## 2021-05-28 LAB
CYTO UR: NORMAL
LAB AP CASE REPORT: NORMAL
LAB AP CLINICAL INFORMATION: NORMAL
PATH REPORT.FINAL DX SPEC: NORMAL
PATH REPORT.GROSS SPEC: NORMAL

## 2021-11-09 ENCOUNTER — OFFICE VISIT (OUTPATIENT)
Dept: FAMILY MEDICINE CLINIC | Facility: CLINIC | Age: 61
End: 2021-11-09

## 2021-11-09 VITALS
HEIGHT: 62 IN | DIASTOLIC BLOOD PRESSURE: 68 MMHG | HEART RATE: 78 BPM | WEIGHT: 114 LBS | RESPIRATION RATE: 15 BRPM | BODY MASS INDEX: 20.98 KG/M2 | OXYGEN SATURATION: 97 % | TEMPERATURE: 98.4 F | SYSTOLIC BLOOD PRESSURE: 110 MMHG

## 2021-11-09 DIAGNOSIS — C43.70: Chronic | ICD-10-CM

## 2021-11-09 DIAGNOSIS — Z80.3 FAMILY HISTORY OF BREAST CANCER IN MOTHER: Chronic | ICD-10-CM

## 2021-11-09 DIAGNOSIS — E04.1 THYROID NODULE: ICD-10-CM

## 2021-11-09 DIAGNOSIS — M81.0 AGE-RELATED OSTEOPOROSIS WITHOUT CURRENT PATHOLOGICAL FRACTURE: ICD-10-CM

## 2021-11-09 DIAGNOSIS — Z23 NEED FOR VIRAL IMMUNIZATION: ICD-10-CM

## 2021-11-09 DIAGNOSIS — Z00.00 ANNUAL PHYSICAL EXAM: Primary | ICD-10-CM

## 2021-11-09 DIAGNOSIS — Z80.41 FAMILY HISTORY OF OVARIAN CANCER: Chronic | ICD-10-CM

## 2021-11-09 LAB
BILIRUB BLD-MCNC: NEGATIVE MG/DL
CLARITY, POC: CLEAR
COLOR UR: YELLOW
EXPIRATION DATE: ABNORMAL
GLUCOSE UR STRIP-MCNC: NEGATIVE MG/DL
KETONES UR QL: ABNORMAL
LEUKOCYTE EST, POC: NEGATIVE
Lab: ABNORMAL
NITRITE UR-MCNC: NEGATIVE MG/ML
PH UR: 5.5 [PH] (ref 5–8)
PROT UR STRIP-MCNC: NEGATIVE MG/DL
RBC # UR STRIP: NEGATIVE /UL
SP GR UR: 1.01 (ref 1–1.03)
UROBILINOGEN UR QL: NORMAL

## 2021-11-09 PROCEDURE — 82607 VITAMIN B-12: CPT | Performed by: FAMILY MEDICINE

## 2021-11-09 PROCEDURE — 80053 COMPREHEN METABOLIC PANEL: CPT | Performed by: FAMILY MEDICINE

## 2021-11-09 PROCEDURE — 81003 URINALYSIS AUTO W/O SCOPE: CPT | Performed by: FAMILY MEDICINE

## 2021-11-09 PROCEDURE — 84443 ASSAY THYROID STIM HORMONE: CPT | Performed by: FAMILY MEDICINE

## 2021-11-09 PROCEDURE — 99396 PREV VISIT EST AGE 40-64: CPT | Performed by: FAMILY MEDICINE

## 2021-11-09 PROCEDURE — 80061 LIPID PANEL: CPT | Performed by: FAMILY MEDICINE

## 2021-11-09 PROCEDURE — 83036 HEMOGLOBIN GLYCOSYLATED A1C: CPT | Performed by: FAMILY MEDICINE

## 2021-11-09 PROCEDURE — 91300 COVID-19 (PFIZER): CPT | Performed by: FAMILY MEDICINE

## 2021-11-09 PROCEDURE — 85025 COMPLETE CBC W/AUTO DIFF WBC: CPT | Performed by: FAMILY MEDICINE

## 2021-11-09 PROCEDURE — 0003A COVID-19 (PFIZER): CPT | Performed by: FAMILY MEDICINE

## 2021-11-09 PROCEDURE — 82306 VITAMIN D 25 HYDROXY: CPT | Performed by: FAMILY MEDICINE

## 2021-11-09 NOTE — PROGRESS NOTES
Subjective   Kimberly Brito is a 61 y.o. female and is here for a comprehensive physical exam. The patient reports no problems. Patient reports last physical date of over 1 year    Do you take any herbs or supplements that were not prescribed by a doctor? mtv  Are you taking calcium supplements? yes  Are you taking aspirin daily? no  Family history of ovarian cancer? yes  Family history of breast cancer? yes  FH of endometrial cancer? no  FH of cervical cancer? no  FH of colon cancer? mother, she had c-scope this year.    Cancer Screening  Mammogram up-to-date?  yes  If yes, last exam date: 3/22/21  BMD up-to-date? yes  If yes, last exam date: 2 years ago  Colonoscopy up-to-date? yes   If yes, last exam date:   Pap up-to-date? yes   If yes, last exam date:      History:  LMP: No LMP recorded (lmp unknown). Patient is postmenopausal.  Menopause at 50 years  Last pap date:   Abnormal pap? no  : 2  Para: 2    Immunization History  Tdap? yes  HPV? not applicable  Pneumonia? not applicable  Shingles? not applicable    The following portions of the patient's history were reviewed and updated as appropriate: allergies, current medications, past family history, past medical history, past social history, past surgical history and problem list.    Past Medical History:   Diagnosis Date   • Acute sinusitis    • Family history of breast cancer in mother    • Family history of ovarian cancer    • Melanoma of hip (HCC)     LEFT    • Menopause    • Osteoporosis    • Rotator cuff tendinitis     right        Family History   Problem Relation Age of Onset   • Breast cancer Mother 75   • Colon cancer Mother    • Ovarian cancer Maternal Aunt         age unknown   • Diabetes Father    • Lung cancer Father    • Heart disease Brother    • Diabetes Brother    • Stroke Maternal Grandmother    • Heart disease Maternal Grandfather        Past Surgical History:   Procedure Laterality Date   • BREAST BIOPSY Left  10/14/2008    US bx   • COLONOSCOPY  11/2012   • EXCISION LESION Left     LEFT HIP 1998       Social History     Socioeconomic History   • Marital status:    Tobacco Use   • Smoking status: Never Smoker   • Smokeless tobacco: Never Used   Substance and Sexual Activity   • Alcohol use: Yes     Alcohol/week: 14.0 standard drinks     Types: 14 Glasses of wine per week   • Drug use: No   • Sexual activity: Yes     Partners: Male     Birth control/protection: Surgical, Post-menopausal       Review of Systems  Do you have pain that bothers you in your daily life? no  Review of Systems   Constitutional: Negative.    HENT: Negative.    Eyes: Negative.    Respiratory: Negative.    Cardiovascular: Negative.    Gastrointestinal: Negative.    Endocrine: Negative.    Genitourinary: Negative.    Musculoskeletal: Negative.    Skin: Negative.    Allergic/Immunologic: Negative.    Neurological: Negative.    Hematological: Negative.    Psychiatric/Behavioral: Negative.    All other systems reviewed and are negative.      Objective   Physical Exam  Vitals and nursing note reviewed.   Constitutional:       Appearance: She is well-developed.   HENT:      Head: Normocephalic and atraumatic.      Mouth/Throat:      Pharynx: No oropharyngeal exudate.   Eyes:      General:         Right eye: No discharge.         Left eye: No discharge.      Pupils: Pupils are equal, round, and reactive to light.   Neck:      Comments: She has a just left of the cricoid cartilage a small pea-sized nodule.  Nonmobile.  Cardiovascular:      Rate and Rhythm: Normal rate and regular rhythm.      Heart sounds: Normal heart sounds.   Pulmonary:      Effort: Pulmonary effort is normal.      Breath sounds: Normal breath sounds.   Abdominal:      General: Bowel sounds are normal.      Palpations: Abdomen is soft. There is no mass.      Tenderness: There is no abdominal tenderness.   Musculoskeletal:         General: Normal range of motion.      Right  shoulder: No swelling.      Cervical back: Normal range of motion and neck supple. No rigidity or tenderness.   Lymphadenopathy:      Cervical: Cervical adenopathy present.   Skin:     General: Skin is warm and dry.      Nails: There is no clubbing.   Neurological:      Mental Status: She is alert and oriented to person, place, and time.      Deep Tendon Reflexes: Reflexes are normal and symmetric.   Psychiatric:         Behavior: Behavior normal.         Thought Content: Thought content normal.         Judgment: Judgment normal.          Assessment/Plan   Healthy female exam.      1.   Problem List Items Addressed This Visit        Endocrine and Metabolic    Thyroid nodule    Relevant Orders    US Thyroid    T4, free    T3       Family History    Family history of ovarian cancer (Chronic)    Relevant Orders    Ambulatory Referral to Gynecology    Family history of breast cancer in mother (Chronic)       Health Encounters    Annual physical exam - Primary    Relevant Orders    CBC & Differential    TSH    Comprehensive Metabolic Panel    Hemoglobin A1c    Lipid Panel    Vitamin D 25 Hydroxy    Vitamin B12    POC Urinalysis Dipstick, Automated (Completed)       Hematology and Neoplasia    Melanoma of hip (HCC) (Chronic)    Overview     LEFT 1998            Musculoskeletal and Injuries    Osteoporosis      Other Visit Diagnoses     Need for viral immunization        Relevant Orders    COVID-19 Vaccine (Pfizer) (Completed)            2. Patient Counseling:  --Nutrition: Stressed importance of moderation in sodium/caffeine intake, saturated fat and cholesterol, caloric balance, sufficient intake of fresh fruits, vegetables, fiber, calcium, iron, and 1 mg of folate supplement per day (for females capable of pregnancy).  --Discussed the issue of estrogen replacement, calcium supplement, and the daily use of baby aspirin.  --Exercise: Stressed the importance of regular exercise.   --Substance Abuse: Discussed  cessation/primary prevention of tobacco, alcohol, or other drug use; driving or other dangerous activities under the influence; availability of treatment for abuse.    --Sexuality: Discussed sexually transmitted diseases, partner selection, use of condoms, avoidance of unintended pregnancy  and contraceptive alternatives.   --Injury prevention: Discussed safety belts, safety helmets, smoke detector, smoking near bedding or upholstery.   --Dental health: Discussed importance of regular tooth brushing, flossing, and dental visits.  --Immunizations reviewed.  --Discussed benefits of screening colonoscopy.  --After hours service discussed with patient    3. Discussed the patient's BMI with her.  The BMI is in the acceptable range  4. Follow up in one year

## 2021-11-10 LAB
25(OH)D3 SERPL-MCNC: 49.8 NG/ML (ref 30–100)
ALBUMIN SERPL-MCNC: 4.9 G/DL (ref 3.5–5.2)
ALBUMIN/GLOB SERPL: 1.6 G/DL
ALP SERPL-CCNC: 45 U/L (ref 39–117)
ALT SERPL W P-5'-P-CCNC: 18 U/L (ref 1–33)
ANION GAP SERPL CALCULATED.3IONS-SCNC: 13.9 MMOL/L (ref 5–15)
AST SERPL-CCNC: 39 U/L (ref 1–32)
BASOPHILS # BLD AUTO: 0.02 10*3/MM3 (ref 0–0.2)
BASOPHILS NFR BLD AUTO: 0.3 % (ref 0–1.5)
BILIRUB SERPL-MCNC: 0.8 MG/DL (ref 0–1.2)
BUN SERPL-MCNC: 10 MG/DL (ref 8–23)
BUN/CREAT SERPL: 12.2 (ref 7–25)
CALCIUM SPEC-SCNC: 9.5 MG/DL (ref 8.6–10.5)
CHLORIDE SERPL-SCNC: 101 MMOL/L (ref 98–107)
CHOLEST SERPL-MCNC: 242 MG/DL (ref 0–200)
CO2 SERPL-SCNC: 25.1 MMOL/L (ref 22–29)
CREAT SERPL-MCNC: 0.82 MG/DL (ref 0.57–1)
DEPRECATED RDW RBC AUTO: 42.6 FL (ref 37–54)
EOSINOPHIL # BLD AUTO: 0.15 10*3/MM3 (ref 0–0.4)
EOSINOPHIL NFR BLD AUTO: 2.6 % (ref 0.3–6.2)
ERYTHROCYTE [DISTWIDTH] IN BLOOD BY AUTOMATED COUNT: 12.8 % (ref 12.3–15.4)
GFR SERPL CREATININE-BSD FRML MDRD: 71 ML/MIN/1.73
GLOBULIN UR ELPH-MCNC: 3 GM/DL
GLUCOSE SERPL-MCNC: 86 MG/DL (ref 65–99)
HBA1C MFR BLD: 5.1 % (ref 4.8–5.6)
HCT VFR BLD AUTO: 41.7 % (ref 34–46.6)
HDLC SERPL-MCNC: 103 MG/DL (ref 40–60)
HGB BLD-MCNC: 14.1 G/DL (ref 12–15.9)
IMM GRANULOCYTES # BLD AUTO: 0.01 10*3/MM3 (ref 0–0.05)
IMM GRANULOCYTES NFR BLD AUTO: 0.2 % (ref 0–0.5)
LDLC SERPL CALC-MCNC: 128 MG/DL (ref 0–100)
LDLC/HDLC SERPL: 1.22 {RATIO}
LYMPHOCYTES # BLD AUTO: 1.02 10*3/MM3 (ref 0.7–3.1)
LYMPHOCYTES NFR BLD AUTO: 17.7 % (ref 19.6–45.3)
MCH RBC QN AUTO: 30.8 PG (ref 26.6–33)
MCHC RBC AUTO-ENTMCNC: 33.8 G/DL (ref 31.5–35.7)
MCV RBC AUTO: 91 FL (ref 79–97)
MONOCYTES # BLD AUTO: 0.53 10*3/MM3 (ref 0.1–0.9)
MONOCYTES NFR BLD AUTO: 9.2 % (ref 5–12)
NEUTROPHILS NFR BLD AUTO: 4.02 10*3/MM3 (ref 1.7–7)
NEUTROPHILS NFR BLD AUTO: 70 % (ref 42.7–76)
NRBC BLD AUTO-RTO: 0 /100 WBC (ref 0–0.2)
PLATELET # BLD AUTO: 179 10*3/MM3 (ref 140–450)
PMV BLD AUTO: 10.4 FL (ref 6–12)
POTASSIUM SERPL-SCNC: 4.5 MMOL/L (ref 3.5–5.2)
PROT SERPL-MCNC: 7.9 G/DL (ref 6–8.5)
RBC # BLD AUTO: 4.58 10*6/MM3 (ref 3.77–5.28)
SODIUM SERPL-SCNC: 140 MMOL/L (ref 136–145)
TRIGL SERPL-MCNC: 69 MG/DL (ref 0–150)
TSH SERPL DL<=0.05 MIU/L-ACNC: 2.14 UIU/ML (ref 0.27–4.2)
VIT B12 BLD-MCNC: 500 PG/ML (ref 211–946)
VLDLC SERPL-MCNC: 11 MG/DL (ref 5–40)
WBC # BLD AUTO: 5.75 10*3/MM3 (ref 3.4–10.8)

## 2021-11-15 ENCOUNTER — HOSPITAL ENCOUNTER (OUTPATIENT)
Dept: ULTRASOUND IMAGING | Facility: HOSPITAL | Age: 61
Discharge: HOME | End: 2021-11-15
Admitting: FAMILY MEDICINE

## 2021-11-15 PROCEDURE — 76536 US EXAM OF HEAD AND NECK: CPT

## 2021-11-18 NOTE — PROGRESS NOTES
Notify pt ultrasound on thyroid gland is normal.  No findings to suggest thyroid gland is causing her symptoms.

## 2021-11-30 ENCOUNTER — APPOINTMENT (OUTPATIENT)
Dept: ULTRASOUND IMAGING | Facility: HOSPITAL | Age: 61
End: 2021-11-30

## 2021-12-17 ENCOUNTER — OFFICE VISIT (OUTPATIENT)
Dept: FAMILY MEDICINE CLINIC | Facility: CLINIC | Age: 61
End: 2021-12-17

## 2021-12-17 VITALS
WEIGHT: 115 LBS | TEMPERATURE: 97.8 F | HEIGHT: 62 IN | BODY MASS INDEX: 21.16 KG/M2 | OXYGEN SATURATION: 99 % | DIASTOLIC BLOOD PRESSURE: 70 MMHG | SYSTOLIC BLOOD PRESSURE: 120 MMHG | HEART RATE: 87 BPM

## 2021-12-17 DIAGNOSIS — R09.89 THROAT TIGHTNESS: Primary | ICD-10-CM

## 2021-12-17 PROCEDURE — 99213 OFFICE O/P EST LOW 20 MIN: CPT | Performed by: FAMILY MEDICINE

## 2021-12-17 RX ORDER — DENOSUMAB 60 MG/ML
1 INJECTION SUBCUTANEOUS
COMMUNITY
Start: 2021-06-23 | End: 2021-12-17

## 2021-12-17 NOTE — PROGRESS NOTES
Kimberly Brito is a 61 y.o. female who presents today for Sore Throat      Patient has had pressure in her throat for over a year. Comes and goes and have not worsened. She has had normal thyroid US and labs. She notices it more at rest. She does not have SOA, pain, voice changes, or difficulty swallowing. Nothing makes it worse but drinking liquids can improve. The pressure feels like it is around the thyroid.        Review of Systems   Constitutional: Negative for fever and unexpected weight loss.   HENT: Negative for congestion, drooling, ear pain, sore throat, swollen glands, trouble swallowing and voice change.    Eyes: Negative for visual disturbance.   Respiratory: Negative for cough, shortness of breath and wheezing.    Cardiovascular: Negative for chest pain and palpitations.   Gastrointestinal: Negative for abdominal pain, blood in stool, constipation, diarrhea, nausea, vomiting and GERD.   Endocrine: Negative for polydipsia and polyuria.   Genitourinary: Negative for difficulty urinating.   Musculoskeletal: Negative for joint swelling.   Skin: Negative for rash and skin lesions.   Allergic/Immunologic: Negative for environmental allergies.   Neurological: Negative for seizures and syncope.   Hematological: Does not bruise/bleed easily.   Psychiatric/Behavioral: Negative for suicidal ideas.        The following portions of the patient's history were reviewed and updated as appropriate: allergies, current medications, past family history, past medical history, past social history, past surgical history and problem list.    Current Outpatient Medications on File Prior to Visit   Medication Sig Dispense Refill   • calcium acetate (PHOSLO) 667 MG capsule Take 1,334 mg by mouth 3 (Three) Times a Day With Meals.     • cholecalciferol (VITAMIN D3) 1000 units tablet Take 2,000 Units by mouth Daily.     • denosumab (PROLIA) 60 MG/ML solution syringe Inject 1 dose under the skin every 6 (six) months.     • Multiple  "Vitamins-Minerals (MULTIVITAMIN ADULT PO) Take 1 tablet by mouth Daily.     • [DISCONTINUED] denosumab (Prolia) 60 MG/ML solution prefilled syringe syringe Inject 1 mL under the skin into the appropriate area as directed Every 6 (Six) Months.       No current facility-administered medications on file prior to visit.       No Known Allergies     Visit Vitals  /70   Pulse 87   Temp 97.8 °F (36.6 °C)   Ht 157.5 cm (62\")   Wt 52.2 kg (115 lb)   LMP  (LMP Unknown)   SpO2 99%   BMI 21.03 kg/m²        Physical Exam  Constitutional:       General: She is not in acute distress.     Appearance: She is well-developed. She is not diaphoretic.   HENT:      Head: Atraumatic.   Neck:      Thyroid: No thyroid mass, thyromegaly or thyroid tenderness.      Trachea: Phonation normal. No tracheal tenderness, tracheostomy or tracheal deviation.   Cardiovascular:      Rate and Rhythm: Normal rate and regular rhythm.      Heart sounds: Normal heart sounds. No murmur heard.  No friction rub. No gallop.    Pulmonary:      Effort: Pulmonary effort is normal. No respiratory distress.      Breath sounds: Normal breath sounds. No wheezing or rales.   Abdominal:      General: Bowel sounds are normal. There is no distension.      Palpations: Abdomen is soft.      Tenderness: There is no abdominal tenderness.   Musculoskeletal:      Cervical back: Normal range of motion and neck supple. No edema, erythema, signs of trauma, rigidity, torticollis or crepitus. No pain with movement, spinous process tenderness or muscular tenderness. Normal range of motion.   Lymphadenopathy:      Cervical: No cervical adenopathy.      Right cervical: No superficial, deep or posterior cervical adenopathy.     Left cervical: No superficial, deep or posterior cervical adenopathy.   Skin:     General: Skin is warm and dry.   Neurological:      Mental Status: She is alert and oriented to person, place, and time.   Psychiatric:         Behavior: Behavior normal.      "     Problems Addressed this Visit        Symptoms and Signs    Throat tightness - Primary     Patient is having a throat pressure which no cause has been able to be identified to this point.  Labs were unremarkable.  Ultrasound of thyroid was unremarkable.  Patient was given referral to ENT for further evaluation and treatment.  If ENT referral does not produce answers for cause of patient's symptoms will likely refer to GI.         Relevant Orders    Ambulatory Referral to ENT (Otolaryngology)      Diagnoses       Codes Comments    Throat tightness    -  Primary ICD-10-CM: R09.89  ICD-9-CM: 784.99           Return if symptoms worsen or fail to improve.    26 minutes was spent on this patient encounter which included history taking, physical exam, answering patient questions, counseling, discussing treatment plan, placing orders, and documentation.    Jules Wright MD   12/17/2021

## 2021-12-17 NOTE — ASSESSMENT & PLAN NOTE
Patient is having a throat pressure which no cause has been able to be identified to this point.  Labs were unremarkable.  Ultrasound of thyroid was unremarkable.  Patient was given referral to ENT for further evaluation and treatment.  If ENT referral does not produce answers for cause of patient's symptoms will likely refer to GI.

## 2021-12-19 PROBLEM — Z01.419 WELL WOMAN EXAM: Status: ACTIVE | Noted: 2021-12-19

## 2021-12-19 PROBLEM — Z80.0 FAMILY HISTORY OF COLON CANCER: Status: ACTIVE | Noted: 2021-12-19

## 2021-12-23 PROCEDURE — U0004 COV-19 TEST NON-CDC HGH THRU: HCPCS | Performed by: PHYSICIAN ASSISTANT

## 2021-12-24 ENCOUNTER — TELEPHONE (OUTPATIENT)
Dept: URGENT CARE | Facility: CLINIC | Age: 61
End: 2021-12-24

## 2021-12-29 ENCOUNTER — OFFICE VISIT (OUTPATIENT)
Dept: OBSTETRICS AND GYNECOLOGY | Facility: CLINIC | Age: 61
End: 2021-12-29

## 2021-12-29 VITALS
DIASTOLIC BLOOD PRESSURE: 66 MMHG | RESPIRATION RATE: 14 BRPM | SYSTOLIC BLOOD PRESSURE: 114 MMHG | WEIGHT: 115 LBS | BODY MASS INDEX: 21.03 KG/M2

## 2021-12-29 DIAGNOSIS — Z80.3 FAMILY HISTORY OF BREAST CANCER IN MOTHER: ICD-10-CM

## 2021-12-29 DIAGNOSIS — Z80.41 FAMILY HISTORY OF OVARIAN CANCER: ICD-10-CM

## 2021-12-29 DIAGNOSIS — Z80.0 FAMILY HISTORY OF COLON CANCER: ICD-10-CM

## 2021-12-29 DIAGNOSIS — Z01.419 WELL WOMAN EXAM: Primary | ICD-10-CM

## 2021-12-29 PROCEDURE — 99396 PREV VISIT EST AGE 40-64: CPT | Performed by: OBSTETRICS & GYNECOLOGY

## 2021-12-29 NOTE — PROGRESS NOTES
Subjective   Chief Complaint   Patient presents with   • Gynecologic Exam     Kimberly Brito is a 61 y.o. year old  menopausal female presenting to be seen for her annual exam.      This past year she has not been on hormone replacement therapy.  She has not had any vaginal bleeding in the last 12 months.  Menopausal symptoms are not present.    SEXUAL Hx:  She is currently sexually active.  In the past year there there has been NO new sexual partners.    Condoms are never used.  She would not like to be screened for STD's at today's exam.  Handley is painful: no  HEALTH Hx:  She exercises regularly: yes.  She wears her seat belt: yes.  She has concerns about domestic violence: no.  She has noticed changes in height: no.  OTHER THINGS SHE WANTS TO DISCUSS TODAY:  Nothing else    The following portions of the patient's history were reviewed and updated as appropriate:problem list, current medications, allergies, past family history, past medical history, past social history and past surgical history.    Social History    Tobacco Use      Smoking status: Never Smoker      Smokeless tobacco: Never Used      Review of Systems  Constitutional POS: nothing reported    NEG: anorexia or night sweats   Genitourinary POS: nothing reported    NEG: dysuria or hematuria      Gastointestinal POS: nothing reported    NEG: bloating, change in bowel habits, melena or reflux symptoms   Integument POS: nothing reported and she sees her dermatologist for routine skins exams    NEG: moles that are changing in size, shape, color or rashes   Breast POS: nothing reported    NEG: persistent breast lump, skin dimpling or nipple discharge        Objective   /66   Resp 14   Wt 52.2 kg (115 lb)   LMP  (LMP Unknown)   Breastfeeding No   BMI 21.03 kg/m²     General:  well developed; well nourished  no acute distress   Skin:  No suspicious lesions seen   Thyroid: normal to inspection and palpation   Breasts:  Examined in supine  position  Symmetric without masses or skin dimpling  Nipples normal without inversion, lesions or discharge  There are no palpable axillary nodes   Abdomen: soft, non-tender; no masses  no umbilical or inguinal hernias are present  no hepato-splenomegaly   Pelvis: Clinical staff was present for exam  External genitalia:  normal appearance of the external genitalia including Bartholin's and Tiro's glands.  :  urethral meatus normal;  Vaginal:  atrophic mucosal changes are present;  Cervix:  normal appearance.  Uterus:  normal size, shape and consistency.  Adnexa:  normal bimanual exam of the adnexa.  Rectal:  digital rectal exam not performed; anus visually normal appearing.        Assessment   1. Normal GYN exam in menopause   2. Osteoporosis - followed by rheumatology  3. Family history of breast, ovarian and colon cancer.  She would be a candidate for genetic consultation  4. Menopausal female currently not on HRT - without significant symptoms affecting activities of daily living  5. She is up to date on all relevant gynecologic and colorectal screenings     Plan   1. Pap was not done today.  I explained to Kimberly that the recommendations for Pap smear interval in a low risk patient has lengthened to 3 years time.  I told Kimberly she still needs to be seen in our office yearly for a full physical including breast and pelvic exam.  2. She was encouraged to get yearly mammograms.  She should report any palpable breast lump(s) or skin changes regardless of mammographic findings.  I explained to Kimberly that notification regarding her mammogram results will come from the center performing the study.  Our office will not be routinely calling with mammogram results.  It is her responsibility to make sure that the results from the mammogram are communicated to her by the breast center.  If she has any questions about the results, she is welcome to call our office anytime.  3. Genetic consultation has been ordered  4. The  importance of keeping all planned follow-up and taking all medications as prescribed was emphasized.  5. Follow up for annual exam 1 year           This note was electronically signed.    Rico Vargas M.D.  December 29, 2021    Part of this note may be an electronic transcription/translation of spoken language to printed text using the Dragon Dictation System.

## 2021-12-30 ENCOUNTER — PATIENT ROUNDING (BHMG ONLY) (OUTPATIENT)
Dept: OBSTETRICS AND GYNECOLOGY | Facility: CLINIC | Age: 61
End: 2021-12-30

## 2022-01-26 ENCOUNTER — TELEPHONE (OUTPATIENT)
Dept: OBSTETRICS AND GYNECOLOGY | Facility: CLINIC | Age: 62
End: 2022-01-26

## 2022-03-24 ENCOUNTER — TRANSCRIBE ORDERS (OUTPATIENT)
Dept: ADMINISTRATIVE | Facility: HOSPITAL | Age: 62
End: 2022-03-24

## 2022-03-24 DIAGNOSIS — Z12.31 VISIT FOR SCREENING MAMMOGRAM: Primary | ICD-10-CM

## 2022-05-16 ENCOUNTER — TELEPHONE (OUTPATIENT)
Dept: ONCOLOGY | Facility: HOSPITAL | Age: 62
End: 2022-05-16

## 2022-05-16 NOTE — TELEPHONE ENCOUNTER
I left a message for the patient to return my call regarding completing a questionnaire prior to an upcoming appointment.

## 2022-05-17 ENCOUNTER — CLINICAL SUPPORT (OUTPATIENT)
Dept: GENETICS | Facility: HOSPITAL | Age: 62
End: 2022-05-17

## 2022-05-17 ENCOUNTER — TELEPHONE (OUTPATIENT)
Dept: OBSTETRICS AND GYNECOLOGY | Facility: CLINIC | Age: 62
End: 2022-05-17

## 2022-05-17 DIAGNOSIS — Z13.79 GENETIC TESTING: Primary | ICD-10-CM

## 2022-05-17 DIAGNOSIS — Z80.41 FAMILY HISTORY OF MALIGNANT NEOPLASM OF OVARY: ICD-10-CM

## 2022-05-17 DIAGNOSIS — Z85.820 HISTORY OF MELANOMA: ICD-10-CM

## 2022-05-17 DIAGNOSIS — Z80.3 FAMILY HISTORY OF MALIGNANT NEOPLASM OF BREAST: ICD-10-CM

## 2022-05-17 PROCEDURE — 96040: CPT | Performed by: GENETIC COUNSELOR, MS

## 2022-05-17 NOTE — TELEPHONE ENCOUNTER
Let patient know I had a chance to review the genetic consultation.  I would like to see her to discuss their findings and talk about management options for screening for her breast cancer given her family's history

## 2022-05-17 NOTE — PROGRESS NOTES
Kimberly Brito, a 61-year-old female, was seen for genetic counseling due to a family history of breast and ovarian cancer.  Ms. Brito was diagnosed with melanoma at age 36 on her hip. Ms. Brito was 14 years old at menarche and her first live birth at age 27. Her current screening includes annual clinical breast exam, annual mammogram, and colonoscopy every five years. She reports having two colon polyps removed. She had a benign breast biopsy at 47. She is post-menopausal, reports taking HRT for two years from age [age], and retains her uterus and ovaries. She was interested in discussing her risk for a hereditary cancer syndrome, as well as screening recommendations and genetic testing options.     PERTINENT FAMILY HISTORY: (see attached pedigree)  Mother:  Breast cancer, 72 (metastatic); Colon cancer dx>50   Mat. Aunt:  Ovarian cancer, 70s  Mat. Aunt:  Breast cancer  Mat. 1st cousin: Breast cancer dx. < 50  Father:   Lung cancer, 70  Pat. Uncle:  Possible lung cancer  Brother:  Chronic lymphocytic leukemia (stage 0), 56    We do not have medical records regarding the cancer diagnoses in the family.    RISK ASSESSMENT: Ms. Brito’s family history of breast and ovarian cancer led to concern regarding a hereditary cancer syndrome.  She clearly meets NCCN guidelines criteria for BRCA1/2 testing based on her l family history of breast and ovarian cancer in close relatives. In cases where an affected relative is not available for testing or not willing to pursue testing, it is appropriate to offer testing to an unaffected individual.     Because Ms. Brito is not pursuing genetic testing at this time, her management should be guided by a family history-based risk assessment. Roger-Tyrese, version 8 is able to take into account personal factors (age at first birth, age at menopause, breast density, etc.) and family history when calculating risk for breast cancer. Computer modeling estimates that Ms. Brito’s lifetime personal  risk for developing breast cancer is up to 21% (Latoya, v8), compared to the general population risk of 12.5%. A risk of 20% or greater warrants consideration of increased screening per NCCN guidelines.  This risk assessment is based on the family history information provided at the time of the appointment and could change in the future should new information be obtained.    GENETIC COUNSELING: (30 minutes) We reviewed the family history information in detail. Cases of cancer follow three general patterns: sporadic, familial, and hereditary.  While most cancer is sporadic, some cases appear to occur in family clusters.  These cases are said to be familial and account for 10-20% of cancer cases.  Familial cases may be due to a combination of shared genes and environmental factors among family members.  In even fewer families, the cancer is said to be inherited, and the genes responsible for the cancer are known.      Family histories typical of hereditary cancer syndromes usually include multiple first- and second-degree relatives diagnosed with cancer types that define a syndrome.  These cases tend to be diagnosed at younger-than-expected ages and can be bilateral or multifocal.  The cancer in these families follows an autosomal dominant inheritance pattern, which indicates the likely presence of a mutation in a cancer susceptibility gene.  Children and siblings of an individual believed to carry this mutation have a 50% chance of inheriting that mutation, thereby inheriting the increased risk to develop cancer.  These mutations can be passed down from the maternal or the paternal lineage.    Hereditary breast cancer accounts for approximately 5-10% of all cases of breast cancer.  A significant proportion of hereditary breast cancer can be attributed to mutations in the BRCA1 and BRCA2 genes.  Mutations in these genes confer an increased risk for breast cancer, ovarian cancer, male breast cancer, prostate  cancer, pancreatic cancer, and melanoma. Women with a BRCA1 or BRCA2 mutation have up to an 87% lifetime risk of breast cancer and up to a 44% risk of ovarian cancer. These genes are not responsible for every case of hereditary breast cancer, and we discussed multigene panels that can evaluate BRCA1/2 and a number of additional cancer related genes simultaneously. A few of the additional genes that have been associated with hereditary breast cancer include PALB2, CHEK2, and AMANDO.  The NCCN has established management guidelines for individuals found to carry mutations in these genes. The standard approach to genetic testing is via a multigene panel.  Genes included on these panels have varying degrees of risk associated, and management and screening guidelines vary based on the specific gene.  Hereditary cancer syndromes can demonstrate incomplete penetrance and variable expression within families. There are genes that are evaluated that have been more recently described, and there may be less data regarding the risks and therefore may not have established management guidelines at this time.     GENETIC TESTING:  The risks, benefits and limitations of genetic testing and implications for clinical management following testing were reviewed. DNA test results can influence decisions regarding screening and prevention.  Genetic testing can have significant psychological implications for both individuals and families. Also discussed was the possibility of employment and insurance discrimination based on genetic test results and the federal and states laws that are in place to prevent this, as well as the limitations of these laws (CORBIN).         We discussed multi-gene panel testing which would involve testing multiple genes associated with an increased risk for cancer. The benefits and limitations of genetic testing were discussed. The implications of a positive or negative test result were discussed.  We also discussed  the importance of testing on an affected relative. It is reasonable to offer unaffected individuals testing when affected relatives are not available for testing.  We discussed the possibility that, in some cases, genetic test results may be ambiguous due to the identification of a genetic variant of uncertain significance. These variants may or may not be associated with an increased cancer risk. With multigene panel testing, it is not uncommon for a variant of uncertain significance (VUS) to be identified.  If a VUS is identified, testing family members is not recommended and screening recommendations are made based on the family history.  The laboratories that perform genetic testing work to reclassify the VUS and send out an amended report if and when a VUS is reclassified.  The majority of variant findings are ultimately reclassified to a negative result. Given her family history, a negative test result does not eliminate all cancer risk, although the risk would not be as high as it would with positive genetic testing. Ms. Brito is interested in possibly pursuing testing in the future, but opted to not pursue testing right now due to concerns obtaining additional insurance and she wants to discuss this information with her . She is going to consider the information, and plans to contact us when she decides to pursue testing and we can coordinate a sample collection at that time.    CLINICAL MANAGEMENT GUIDELINES: Options available to individuals with an increased lifetime risk (20% or greater) for breast cancer was discussed, including increased surveillance and chemoprevention.  Given her family history, Ms. Brito is at an increased lifetime risk for breast cancer, which warrants increased surveillance.  Increased surveillance, based on NCCN guidelines, would consist of semi-annual clinical breast exam and annual mammography starting 10 years prior to the earliest breast cancer diagnosis in the family,  or age 40, whichever is earliest.  According to an American Cancer Society expert panel and NCCN guidelines, annual breast MRI should be offered to women whose lifetime risk of breast cancer is 20-25 percent or more, typically beginning at the same age as mammography.  Breast cancer chemoprevention is another option that can be considered. Studies have shown that Tamoxifen and Raloxifene can cut the risk of estrogen receptor positive breast cancer by 50% when taken by high-risk women over a 5-year period.  There are risks and side effects associated with these medications; therefore, the risks versus benefits must be considered prior to deciding to take chemopreventative medications. These recommendations could change if Ms. Brito were to pursue genetic testing and test positive for a gene mutation in the future, or if there were any relevant changes to the family history.     PLAN:  Genetic counseling remains available to Ms. Brito. Genetic testing remains available to her in the future, and she can contact us when she is ready to pursue it. She plans to follow up with her gynecologist for coordination of increased breast screening.  Ms. Brito is welcome to contact us if she has any questions or concerns at [phone].        Cher Carlos MS, [facility] [place], [facility]     Licensed Certified Genetic Counselor       Cc: Kimberly Vargas MD

## 2022-05-24 ENCOUNTER — HOSPITAL ENCOUNTER (OUTPATIENT)
Dept: MAMMOGRAPHY | Facility: HOSPITAL | Age: 62
Discharge: HOME | End: 2022-05-24
Admitting: OBSTETRICS & GYNECOLOGY

## 2022-05-24 DIAGNOSIS — Z12.31 VISIT FOR SCREENING MAMMOGRAM: ICD-10-CM

## 2022-05-24 PROCEDURE — 77063 BREAST TOMOSYNTHESIS BI: CPT | Performed by: RADIOLOGY

## 2022-05-24 PROCEDURE — 77067 SCR MAMMO BI INCL CAD: CPT | Performed by: RADIOLOGY

## 2022-05-24 PROCEDURE — 77067 SCR MAMMO BI INCL CAD: CPT

## 2022-05-24 PROCEDURE — 77063 BREAST TOMOSYNTHESIS BI: CPT

## 2022-07-13 ENCOUNTER — TELEPHONE (OUTPATIENT)
Dept: FAMILY MEDICINE CLINIC | Facility: CLINIC | Age: 62
End: 2022-07-13

## 2022-07-13 NOTE — TELEPHONE ENCOUNTER
Caller: iKmberly Brito    Relationship to patient: Self    Best call back number: [phone]    Patient is needing: PATIENT WOULD LIKE TO ORDER LABS FOR THE WEEK BEFORE HER PHYSICAL APPOINTMENT ON 12/6/22. PLEASE CALL PATIENT WHEN APPROVED/DENIED

## 2022-07-14 ENCOUNTER — TELEPHONE (OUTPATIENT)
Dept: FAMILY MEDICINE CLINIC | Facility: CLINIC | Age: 62
End: 2022-07-14

## 2022-07-14 NOTE — TELEPHONE ENCOUNTER
Caller: Jose J Brito    Relationship to patient: Emergency Contact    Best call back number: [phone]    Date of exposure: UNKNOWN    Date of positive COVID19 test: TODAY    Date if possible COVID19 exposure: UNKNOWN    COVID19 symptoms: COUGH, HEADACHE, FEVER, AND CHILLS    Date of initial quarantine: 07/14/2022    Additional information or concerns:  HAS CALLED REQUESTING A CALL BACK ASAP TO ADVISE HIM ON WHAT THE NEXT STEPS SHOULD BE IN PATIENT'S PLAN OF CARE. PATIENT TESTED POSITIVE FOR COVID.  IS ALSO WANTING TO KNOW IF THERE IS ANYTHING THAT CAN BE CALLED INTO PHARMACY TO HELP TREAT    What is the patients preferred pharmacy: [facility] [address] [place] [address] [phone]

## 2022-07-14 NOTE — TELEPHONE ENCOUNTER
Patient can take over-the-counter cold and flu medication for symptomatic relief.  If she would like to discuss symptoms and other treatment options please have her schedule a telemedicine appointment with me or one of my colleagues.

## 2022-07-15 ENCOUNTER — TELEPHONE (OUTPATIENT)
Dept: FAMILY MEDICINE CLINIC | Facility: CLINIC | Age: 62
End: 2022-07-15

## 2022-07-25 DIAGNOSIS — Z00.00 ANNUAL PHYSICAL EXAM: Primary | ICD-10-CM

## 2022-10-26 ENCOUNTER — TELEPHONE (OUTPATIENT)
Dept: FAMILY MEDICINE CLINIC | Facility: CLINIC | Age: 62
End: 2022-10-26

## 2022-10-26 ENCOUNTER — NURSE TRIAGE (OUTPATIENT)
Dept: CALL CENTER | Facility: HOSPITAL | Age: 62
End: 2022-10-26

## 2022-10-26 NOTE — TELEPHONE ENCOUNTER
[facility]- She has an office visit - on 11/02/22- Her /80. She is not having any symptoms. Advised to call her PCP tomorrow. She has no pain or sob. Follow care advice.    Reason for Disposition  • [1] Systolic BP  >= 130 OR Diastolic >= 80 AND [2] taking BP medications    Additional Information  • Negative: Difficult to awaken or acting confused (e.g., disoriented, slurred speech)  • Negative: SEVERE difficulty breathing (e.g., struggling for each breath, speaks in single words)  • Negative: [1] Weakness of the face, arm or leg on one side of the body AND [2] new-onset  • Negative: [1] Numbness (i.e., loss of sensation) of the face, arm or leg on one side of the body AND [2] new-onset  • Negative: [1] Chest pain lasts > 5 minutes AND [2] history of heart disease  (i.e., heart attack, bypass surgery, angina, angioplasty, CHF)  • Negative: [1] Chest pain AND [2] took nitrogylcerin AND [3] pain was not relieved  • Negative: Sounds like a life-threatening emergency to the triager  • Negative: Symptom is main concern  (e.g., headache, chest pain)  • Negative: Low blood pressure is main concern  • Negative: [1] Systolic BP  >= 160 OR Diastolic >= 100 AND [2] cardiac or neurologic symptoms (e.g., chest pain, difficulty breathing, unsteady gait, blurred vision)  • Negative: [1] Pregnant 20 or more weeks (or postpartum < 6 weeks) AND [2] new hand or face swelling  • Negative: [1] Pregnant 20 or more weeks AND [2] Systolic BP  >= 140 OR Diastolic >= 90  • Negative: [1] Systolic BP  >= 200 OR Diastolic >= 120  AND [2] having NO cardiac or neurologic symptoms  • Negative: [1] Postpartum < 6 weeks AND [2] Systolic BP  >= 140 OR Diastolic >= 90  • Negative: [1] Systolic BP  >= 180 OR Diastolic >= 110 AND [2] missed most recent dose of blood pressure medication  • Negative: Systolic BP  >= 180 OR Diastolic >= 110  • Negative: Ran out of BP medications  • Negative: Systolic BP  >= 160 OR Diastolic >= 100  • Negative: [1] Taking BP  "medications AND [2] feels is having side effects (e.g., impotence, cough, dizzy upon standing)  • Negative: [1] Systolic BP  >= 130 OR Diastolic >= 80 AND [2] pregnant    Answer Assessment - Initial Assessment Questions  1. BLOOD PRESSURE: \"What is the blood pressure?\" \"Did you take at least two measurements 5 minutes apart?\"      136/80   2. ONSET: \"When did you take your blood pressure?\"      She has noted it is higher than her normal for the last week.   3. HOW: \"How did you obtain the blood pressure?\" (e.g., visiting nurse, automatic home BP monitor)      Automatic bp cuff   4. HISTORY: \"Do you have a history of high blood pressure?\"      no  5. MEDICATIONS: \"Are you taking any medications for blood pressure?\" \"Have you missed any doses recently?\"      Not missed any doses   6. OTHER SYMPTOMS: \"Do you have any symptoms?\" (e.g., headache, chest pain, blurred vision, difficulty breathing, weakness)      No symptoms.   7. PREGNANCY: \"Is there any chance you are pregnant?\" \"When was your last menstrual period?\"      n    Protocols used: BLOOD PRESSURE - HIGH-ADULT-AH      "

## 2022-10-26 NOTE — TELEPHONE ENCOUNTER
Caller: Kimberly Brito    Relationship to patient: Self    Best call back number: [phone]    Patient is needing:     PATIENT REPORTS SHE WENT INTP URGENT CARE WHEN SHE WAS FEELING POORLY AND BP WAS ELEVATED. WHEN SHE GOT HOME IT WAS EVEN MORE ELEVATED. THAT WAS A WEEK AGO AND BP IS STILL HIGH. PATIENT WOULD LIKE TO KNOW IF SHE SHOULD COME IN.

## 2022-10-26 NOTE — TELEPHONE ENCOUNTER
Advised pt that she will need an appointment for elevated blood pressure.       Pt verbalized understanding has no further questions at this time.

## 2022-11-02 ENCOUNTER — OFFICE VISIT (OUTPATIENT)
Dept: FAMILY MEDICINE CLINIC | Facility: CLINIC | Age: 62
End: 2022-11-02

## 2022-11-02 VITALS
WEIGHT: 110.6 LBS | OXYGEN SATURATION: 99 % | BODY MASS INDEX: 20.35 KG/M2 | DIASTOLIC BLOOD PRESSURE: 86 MMHG | HEIGHT: 62 IN | TEMPERATURE: 98.2 F | SYSTOLIC BLOOD PRESSURE: 144 MMHG | HEART RATE: 86 BPM

## 2022-11-02 DIAGNOSIS — R03.0 ELEVATED BLOOD PRESSURE READING: Primary | ICD-10-CM

## 2022-11-02 PROCEDURE — 99213 OFFICE O/P EST LOW 20 MIN: CPT | Performed by: PHYSICIAN ASSISTANT

## 2022-11-02 NOTE — PROGRESS NOTES
Answers for HPI/ROS submitted by the patient on 10/26/2022  What is the primary reason for your visit?: High Blood Pressure         Follow Up Office Visit      Date: 2022   Patient Name: Kimberly Brito  : 1960   MRN: [id number]     Chief Complaint:    Chief Complaint   Patient presents with   • Hypertension     Pt is concerned about her blood pressure  Her discharge papers from urgent care tell her, her BP was elevated. Since then she has been checking it at home and it is staying some what elevated for her       History of Present Illness: Kimberly Brito is a 62 y.o. female who is here today to follow up with blood pressures being elevated.  She did go to urgent care for sinusitis and blood pressure was noted to be elevated then.  Since then she has been checking at home and it is staying a bit elevated, borderline at times but not always.  She denies any headache, no chest pain or shortness of breath.      Subjective      Review of systems:  Review of Systems   Constitutional: Negative for fatigue and fever.   HENT: Negative for nosebleeds.    Eyes: Negative for visual disturbance.   Respiratory: Negative for shortness of breath.    Cardiovascular: Negative for chest pain and leg swelling.   Neurological: Negative for headache.        I have reviewed and the following portions of the patient's history were updated as appropriate: past family history, past medical history, past social history, past surgical history and problem list.    Medications:     Current Outpatient Medications:   •  Biotin 10 MG tablet, Take 1 capsule by mouth., Disp: , Rfl:   •  calcium acetate (PHOSLO) 667 MG capsule, Take 1,334 mg by mouth 3 (Three) Times a Day With Meals., Disp: , Rfl:   •  cholecalciferol (VITAMIN D3) 1000 units tablet, Take 2,000 Units by mouth Daily., Disp: , Rfl:   •  denosumab (Prolia) 60 MG/ML solution prefilled syringe syringe, Inject 1 mL under the skin into the appropriate area as directed Every 6 (Six)  "Months., Disp: , Rfl:   •  Multiple Vitamins-Minerals (MULTIVITAMIN ADULT PO), Take 1 tablet by mouth Daily., Disp: , Rfl:     Allergies:   No Known Allergies    Objective     Vital Signs:   Vitals:    11/02/22 1307   BP: 144/86   Pulse: 86   Temp: 98.2 °F (36.8 °C)   TempSrc: Infrared   SpO2: 99%   Weight: 50.2 kg (110 lb 9.6 oz)   Height: 157.5 cm (62\")   PainSc: 0-No pain     Body mass index is 20.23 kg/m².   BMI is within normal parameters. No other follow-up for BMI required.      Physical Exam:   Physical Exam  Vitals and nursing note reviewed.   Constitutional:       Appearance: Normal appearance.   Cardiovascular:      Rate and Rhythm: Normal rate and regular rhythm.   Pulmonary:      Effort: Pulmonary effort is normal.      Breath sounds: Normal breath sounds.   Musculoskeletal:      Cervical back: Neck supple.      Right lower leg: No edema.      Left lower leg: No edema.   Neurological:      Mental Status: She is alert.          Assessment / Plan      Assessment/Plan:   Diagnoses and all orders for this visit:    1. Elevated blood pressure reading (Primary)    BP today is 144/86.  This is mildly elevated.  She currently takes no medication.  We discussed possibility of medication versus continuing to monitor and we elect to continue to monitor this until she sees Dr. Wright on December 6.  Should she notice the readings becoming more elevated she will let me know and we will go ahead and start a medication.  She will also watch the sodium and caffeine in her diet as well.    Follow Up:   No follow-ups on file.    Kelly Mahoney PA-C   [facility] [place]  "

## 2022-11-30 ENCOUNTER — LAB (OUTPATIENT)
Dept: LAB | Facility: HOSPITAL | Age: 62
End: 2022-11-30

## 2022-11-30 DIAGNOSIS — Z00.00 ANNUAL PHYSICAL EXAM: ICD-10-CM

## 2022-11-30 LAB
25(OH)D3 SERPL-MCNC: 62.2 NG/ML (ref 30–100)
ALBUMIN SERPL-MCNC: 4.4 G/DL (ref 3.5–5.2)
ALBUMIN/GLOB SERPL: 1.6 G/DL
ALP SERPL-CCNC: 39 U/L (ref 39–117)
ALT SERPL W P-5'-P-CCNC: 15 U/L (ref 1–33)
ANION GAP SERPL CALCULATED.3IONS-SCNC: 8.8 MMOL/L (ref 5–15)
AST SERPL-CCNC: 31 U/L (ref 1–32)
BASOPHILS # BLD AUTO: 0.03 10*3/MM3 (ref 0–0.2)
BASOPHILS NFR BLD AUTO: 0.6 % (ref 0–1.5)
BILIRUB SERPL-MCNC: 0.8 MG/DL (ref 0–1.2)
BUN SERPL-MCNC: 11 MG/DL (ref 8–23)
BUN/CREAT SERPL: 11.7 (ref 7–25)
CALCIUM SPEC-SCNC: 9 MG/DL (ref 8.6–10.5)
CHLORIDE SERPL-SCNC: 102 MMOL/L (ref 98–107)
CHOLEST SERPL-MCNC: 286 MG/DL (ref 0–200)
CO2 SERPL-SCNC: 26.2 MMOL/L (ref 22–29)
CREAT SERPL-MCNC: 0.94 MG/DL (ref 0.57–1)
DEPRECATED RDW RBC AUTO: 41.4 FL (ref 37–54)
EGFRCR SERPLBLD CKD-EPI 2021: 69.2 ML/MIN/1.73
EOSINOPHIL # BLD AUTO: 0.1 10*3/MM3 (ref 0–0.4)
EOSINOPHIL NFR BLD AUTO: 2.2 % (ref 0.3–6.2)
ERYTHROCYTE [DISTWIDTH] IN BLOOD BY AUTOMATED COUNT: 13 % (ref 12.3–15.4)
GLOBULIN UR ELPH-MCNC: 2.8 GM/DL
GLUCOSE SERPL-MCNC: 92 MG/DL (ref 65–99)
HBA1C MFR BLD: 5.2 % (ref 4.8–5.6)
HCT VFR BLD AUTO: 39.7 % (ref 34–46.6)
HDLC SERPL-MCNC: 96 MG/DL (ref 40–60)
HGB BLD-MCNC: 13.6 G/DL (ref 12–15.9)
IMM GRANULOCYTES # BLD AUTO: 0.01 10*3/MM3 (ref 0–0.05)
IMM GRANULOCYTES NFR BLD AUTO: 0.2 % (ref 0–0.5)
LDLC SERPL CALC-MCNC: 176 MG/DL (ref 0–100)
LDLC/HDLC SERPL: 1.79 {RATIO}
LYMPHOCYTES # BLD AUTO: 1.16 10*3/MM3 (ref 0.7–3.1)
LYMPHOCYTES NFR BLD AUTO: 24.9 % (ref 19.6–45.3)
MCH RBC QN AUTO: 30.5 PG (ref 26.6–33)
MCHC RBC AUTO-ENTMCNC: 34.3 G/DL (ref 31.5–35.7)
MCV RBC AUTO: 89 FL (ref 79–97)
MONOCYTES # BLD AUTO: 0.43 10*3/MM3 (ref 0.1–0.9)
MONOCYTES NFR BLD AUTO: 9.2 % (ref 5–12)
NEUTROPHILS NFR BLD AUTO: 2.92 10*3/MM3 (ref 1.7–7)
NEUTROPHILS NFR BLD AUTO: 62.9 % (ref 42.7–76)
NRBC BLD AUTO-RTO: 0 /100 WBC (ref 0–0.2)
PLATELET # BLD AUTO: 167 10*3/MM3 (ref 140–450)
PMV BLD AUTO: 10.9 FL (ref 6–12)
POTASSIUM SERPL-SCNC: 4.6 MMOL/L (ref 3.5–5.2)
PROT SERPL-MCNC: 7.2 G/DL (ref 6–8.5)
RBC # BLD AUTO: 4.46 10*6/MM3 (ref 3.77–5.28)
SODIUM SERPL-SCNC: 137 MMOL/L (ref 136–145)
TRIGL SERPL-MCNC: 89 MG/DL (ref 0–150)
TSH SERPL DL<=0.05 MIU/L-ACNC: 4.08 UIU/ML (ref 0.27–4.2)
VLDLC SERPL-MCNC: 14 MG/DL (ref 5–40)
WBC NRBC COR # BLD: 4.65 10*3/MM3 (ref 3.4–10.8)

## 2022-11-30 PROCEDURE — 80053 COMPREHEN METABOLIC PANEL: CPT

## 2022-11-30 PROCEDURE — 80061 LIPID PANEL: CPT

## 2022-11-30 PROCEDURE — 82306 VITAMIN D 25 HYDROXY: CPT

## 2022-11-30 PROCEDURE — 84443 ASSAY THYROID STIM HORMONE: CPT

## 2022-11-30 PROCEDURE — 85025 COMPLETE CBC W/AUTO DIFF WBC: CPT

## 2022-11-30 PROCEDURE — 83036 HEMOGLOBIN GLYCOSYLATED A1C: CPT

## 2022-12-06 ENCOUNTER — OFFICE VISIT (OUTPATIENT)
Dept: FAMILY MEDICINE CLINIC | Facility: CLINIC | Age: 62
End: 2022-12-06

## 2022-12-06 VITALS
HEIGHT: 62 IN | BODY MASS INDEX: 20.06 KG/M2 | WEIGHT: 109 LBS | HEART RATE: 58 BPM | DIASTOLIC BLOOD PRESSURE: 80 MMHG | TEMPERATURE: 97.1 F | OXYGEN SATURATION: 100 % | SYSTOLIC BLOOD PRESSURE: 120 MMHG

## 2022-12-06 DIAGNOSIS — Z00.00 ANNUAL PHYSICAL EXAM: Primary | ICD-10-CM

## 2022-12-06 PROBLEM — L30.9 ECZEMA: Status: ACTIVE | Noted: 2022-12-06

## 2022-12-06 PROBLEM — E78.2 MIXED HYPERLIPIDEMIA: Status: ACTIVE | Noted: 2022-12-06

## 2022-12-06 PROCEDURE — 99396 PREV VISIT EST AGE 40-64: CPT | Performed by: FAMILY MEDICINE

## 2022-12-06 RX ORDER — DESONIDE 0.5 MG/G
1 OINTMENT TOPICAL 2 TIMES DAILY
COMMUNITY

## 2022-12-06 NOTE — ASSESSMENT & PLAN NOTE
The patient is here for health maintenance visit.  Currently, the patient consumes a healthy diet and has an adequate exercise regimen.  Immunizations were reviewed today.  Advice and education was given regarding nutrition, aerobic exercise, routine dental evaluations, routine eye exams, reproductive health, cardiovascular risk reduction, sunscreen use, self skin examination (annual dermatology evaluations) and seatbelt use (general overall safety).  Labs reviewed with patient.  Annual wellness evaluation is recommended.

## 2022-12-06 NOTE — PROGRESS NOTES
Kimberly Brito is a 62 y.o. female who presents today for a well woman exam.    Chief Complaint   Patient presents with   • Annual Exam        HPI     Last pap smear 2 years ago, results were normal. History of abnormal pap smears once years ago and normal since. No family history of cervical, or uterine cancer. Ovarian cancer in maternal aunt.     Sexually active with one male partner. No vaginal discharge, itching, dysuria, or pelvic pain. Not interested in screening for STIs.     Last mammogram <1 year ago, results were normal. History of abnormal mammogram due to cyst when she was on hormones. Biopsy was negative. Family history of breast cancer in mother.     She sees the dentist, optometrist, and dermatologist regularly.     Diet is regular and healthy.     Physical activity includes cardio and strength training at [facility]. She goes 4-5 days a week.    Patient has problems staying asleep through the night. She wakes up three times a night and is able to get back to sleep. She has not taken anything for this in the past and is not interested in trying treatment.  Average hours per night 6-7.     No mood problems.   PHQ-2/PHQ-9 Depression Screening 12/6/2022   Retired PHQ-9 Total Score -   Retired Total Score -   Little Interest or Pleasure in Doing Things 0-->not at all   Feeling Down, Depressed or Hopeless 0-->not at all   PHQ-9: Brief Depression Severity Measure Score 0       Review of Systems   Constitutional: Negative for fever and unexpected weight loss.   HENT: Negative for congestion, ear pain and sore throat.    Eyes: Negative for visual disturbance.   Respiratory: Negative for cough, shortness of breath and wheezing.    Cardiovascular: Negative for chest pain and palpitations.   Gastrointestinal: Negative for abdominal pain, blood in stool, constipation, diarrhea, nausea, vomiting and GERD.   Endocrine: Negative for polydipsia and polyuria.   Genitourinary: Negative for difficulty urinating.  "  Musculoskeletal: Negative for joint swelling.   Skin: Negative for rash and skin lesions.   Allergic/Immunologic: Negative for environmental allergies.   Neurological: Negative for seizures and syncope.   Hematological: Does not bruise/bleed easily.   Psychiatric/Behavioral: Negative for suicidal ideas.        Health Maintenance   Topic Date Due   • INFLUENZA VACCINE  2023 (Originally 2022)   • PAP SMEAR  10/19/2023   • LIPID PANEL  2023   • MAMMOGRAM  2024   • DXA SCAN  2024   • TDAP/TD VACCINES (4 - Td or Tdap) 2031   • ZOSTER VACCINE  Completed       Obstetric History:  OB History        2    Para   2    Term   2            AB        Living   2       SAB        IAB        Ectopic        Molar        Multiple        Live Births   2          Obstetric Comments    - Kurt (P1   - Ashia    1 Grandchildren:   0 grandsons & 1 granddaughters  Her grandchildren call her \"Beverley\"            Menstrual History:     No LMP recorded (lmp unknown). Patient is postmenopausal.         Past Medical History:   Diagnosis Date   • Melanoma of hip (HCC)    • Osteoporosis    • Rotator cuff tendinitis of right shoulder         Past Surgical History:   Procedure Laterality Date   • MAMMO STEREOTACTIC BREAST BIOPSY 1ST W WO DEVICE Left 10/14/2008    US bx   • SKIN CANCER EXCISION Left     Melanoma of left hip        Family History   Problem Relation Age of Onset   • Breast cancer Mother 75   • Colon cancer Mother 65   • Ovarian cancer Maternal Aunt         age unknown   • Diabetes Father    • Lung cancer Father    • Arthritis Father    • Heart disease Brother    • Diabetes Brother    • Stroke Maternal Grandmother    • Heart disease Maternal Grandfather    • Kidney disease Maternal Aunt    • Kidney disease Maternal Uncle         Social History     Socioeconomic History   • Marital status:    Tobacco Use   • Smoking status: Never     Passive exposure: Never   • " "Smokeless tobacco: Never   • Tobacco comments:     Parents smoked in the home   Vaping Use   • Vaping Use: Never used   Substance and Sexual Activity   • Alcohol use: Yes     Alcohol/week: 21.0 standard drinks     Types: 7 Shots of liquor, 14 Glasses of wine per week   • Drug use: No   • Sexual activity: Yes     Partners: Male     Birth control/protection: Post-menopausal, Surgical        Current Outpatient Medications on File Prior to Visit   Medication Sig Dispense Refill   • Biotin 10 MG tablet Take 1 capsule by mouth.     • calcium acetate (PHOSLO) 667 MG capsule Take 1,334 mg by mouth 3 (Three) Times a Day With Meals.     • cholecalciferol (VITAMIN D3) 1000 units tablet Take 2,000 Units by mouth Daily.     • denosumab (Prolia) 60 MG/ML solution prefilled syringe syringe Inject 1 mL under the skin into the appropriate area as directed Every 6 (Six) Months.     • desonide (DESOWEN) 0.05 % ointment Apply 1 application topically to the appropriate area as directed 2 (Two) Times a Day.     • hydrocortisone 2.5 % ointment Apply 1 application topically to the appropriate area as directed 2 (Two) Times a Day.     • Multiple Vitamins-Minerals (MULTIVITAMIN ADULT PO) Take 1 tablet by mouth Daily.       No current facility-administered medications on file prior to visit.       No Known Allergies     Visit Vitals  /80   Pulse 58   Temp 97.1 °F (36.2 °C)   Ht 157.5 cm (62\")   Wt 49.4 kg (109 lb)   LMP  (LMP Unknown)   SpO2 100%   BMI 19.94 kg/m²        Physical Exam  Constitutional:       General: She is not in acute distress.     Appearance: She is well-developed. She is not diaphoretic.   HENT:      Head: Atraumatic.   Cardiovascular:      Rate and Rhythm: Normal rate and regular rhythm.      Heart sounds: Normal heart sounds. No murmur heard.    No friction rub. No gallop.   Pulmonary:      Effort: Pulmonary effort is normal. No respiratory distress.      Breath sounds: Normal breath sounds. No stridor. No " wheezing, rhonchi or rales.   Abdominal:      General: Bowel sounds are normal. There is no distension.      Palpations: Abdomen is soft. There is no mass.      Tenderness: There is no abdominal tenderness. There is no guarding or rebound.      Hernia: No hernia is present.   Musculoskeletal:      Cervical back: Normal range of motion and neck supple.   Skin:     General: Skin is warm and dry.   Neurological:      Mental Status: She is alert and oriented to person, place, and time.   Psychiatric:         Behavior: Behavior normal.          Results for orders placed or performed in visit on 11/30/22   Comprehensive Metabolic Panel    Specimen: Blood   Result Value Ref Range    Glucose 92 65 - 99 mg/dL    BUN 11 8 - 23 mg/dL    Creatinine 0.94 0.57 - 1.00 mg/dL    Sodium 137 136 - 145 mmol/L    Potassium 4.6 3.5 - 5.2 mmol/L    Chloride 102 98 - 107 mmol/L    CO2 26.2 22.0 - 29.0 mmol/L    Calcium 9.0 8.6 - 10.5 mg/dL    Total Protein 7.2 6.0 - 8.5 g/dL    Albumin 4.40 3.50 - 5.20 g/dL    ALT (SGPT) 15 1 - 33 U/L    AST (SGOT) 31 1 - 32 U/L    Alkaline Phosphatase 39 39 - 117 U/L    Total Bilirubin 0.8 0.0 - 1.2 mg/dL    Globulin 2.8 gm/dL    A/G Ratio 1.6 g/dL    BUN/Creatinine Ratio 11.7 7.0 - 25.0    Anion Gap 8.8 5.0 - 15.0 mmol/L    eGFR 69.2 >60.0 mL/min/1.73   Hemoglobin A1c    Specimen: Blood   Result Value Ref Range    Hemoglobin A1C 5.20 4.80 - 5.60 %   Lipid Panel    Specimen: Blood   Result Value Ref Range    Total Cholesterol 286 (H) 0 - 200 mg/dL    Triglycerides 89 0 - 150 mg/dL    HDL Cholesterol 96 (H) 40 - 60 mg/dL    LDL Cholesterol  176 (H) 0 - 100 mg/dL    VLDL Cholesterol 14 5 - 40 mg/dL    LDL/HDL Ratio 1.79    TSH Rfx On Abnormal To Free T4    Specimen: Blood   Result Value Ref Range    TSH 4.080 0.270 - 4.200 uIU/mL   Vitamin D 25 Hydroxy    Specimen: Blood   Result Value Ref Range    25 Hydroxy, Vitamin D 62.2 30.0 - 100.0 ng/ml   CBC Auto Differential    Specimen: Blood   Result Value Ref  Range    WBC 4.65 3.40 - 10.80 10*3/mm3    RBC 4.46 3.77 - 5.28 10*6/mm3    Hemoglobin 13.6 12.0 - 15.9 g/dL    Hematocrit 39.7 34.0 - 46.6 %    MCV 89.0 79.0 - 97.0 fL    MCH 30.5 26.6 - 33.0 pg    MCHC 34.3 31.5 - 35.7 g/dL    RDW 13.0 12.3 - 15.4 %    RDW-SD 41.4 37.0 - 54.0 fl    MPV 10.9 6.0 - 12.0 fL    Platelets 167 140 - 450 10*3/mm3    Neutrophil % 62.9 42.7 - 76.0 %    Lymphocyte % 24.9 19.6 - 45.3 %    Monocyte % 9.2 5.0 - 12.0 %    Eosinophil % 2.2 0.3 - 6.2 %    Basophil % 0.6 0.0 - 1.5 %    Immature Grans % 0.2 0.0 - 0.5 %    Neutrophils, Absolute 2.92 1.70 - 7.00 10*3/mm3    Lymphocytes, Absolute 1.16 0.70 - 3.10 10*3/mm3    Monocytes, Absolute 0.43 0.10 - 0.90 10*3/mm3    Eosinophils, Absolute 0.10 0.00 - 0.40 10*3/mm3    Basophils, Absolute 0.03 0.00 - 0.20 10*3/mm3    Immature Grans, Absolute 0.01 0.00 - 0.05 10*3/mm3    nRBC 0.0 0.0 - 0.2 /100 WBC        Immunization History   Administered Date(s) Administered   • COVID-19 (PFIZER) PURPLE CAP 03/02/2021, 03/22/2021, 04/12/2021, 05/03/2021, 11/09/2021   • Flu Vaccine Quad PF >36MO 10/14/2020   • INFLUENZA SPLIT TRI 10/05/2020, 10/15/2021   • Influenza, Unspecified 10/28/2021   • Shingrix 12/28/2018, 03/08/2019, 03/08/2019   • Tdap 09/09/2011, 08/03/2021   • flucelvax quad pfs =>4 YRS 10/14/2020       Problems Addressed this Visit        Health Encounters    Annual physical exam - Primary     The patient is here for health maintenance visit.  Currently, the patient consumes a healthy diet and has an adequate exercise regimen.  Immunizations were reviewed today.  Advice and education was given regarding nutrition, aerobic exercise, routine dental evaluations, routine eye exams, reproductive health, cardiovascular risk reduction, sunscreen use, self skin examination (annual dermatology evaluations) and seatbelt use (general overall safety).  Labs reviewed with patient.  Annual wellness evaluation is recommended.          Diagnoses       Codes Comments     Annual physical exam    -  Primary ICD-10-CM: Z00.00  ICD-9-CM: V70.0           BMI is within normal parameters. No other follow-up for BMI required.       Return in about 1 year (around 12/6/2023) for Annual.    Jules Wright MD  12/6/2022

## 2023-01-01 PROBLEM — R09.89 THROAT TIGHTNESS: Status: RESOLVED | Noted: 2021-12-17 | Resolved: 2023-01-01

## 2023-01-02 NOTE — PROGRESS NOTES
Subjective   Chief Complaint   Patient presents with   • Gynecologic Exam     Kimberly Brito is a 62 y.o. year old  menopausal female presenting to be seen for her annual exam.  Previously genetic consultation / screening was recommended.  She did see them but elected not to be tested.  She is still not interested in genetic testing.    This past year she has not been on hormone replacement therapy.  She has not had any vaginal bleeding in the last 12 months.  Menopausal symptoms are not present.    SEXUAL Hx:  She is currently sexually active.  In the past year there there has been NO new sexual partners.    Condoms are never used.  She would not like to be screened for STD's at today's exam.  [place] is painful: no  HEALTH Hx:  She exercises regularly: yes.  She wears her seat belt: yes.  She has concerns about domestic violence: no.  She has noticed changes in height: no.  OTHER THINGS SHE WANTS TO DISCUSS TODAY:  Nothing else    The following portions of the patient's history were reviewed and updated as appropriate:problem list, current medications, allergies, past family history, past medical history, past social history and past surgical history.    Social History    Tobacco Use      Smoking status: Never      Smokeless tobacco: Never      Tobacco comments: Parents smoked in the home      Review of Systems  Constitutional POS: nothing reported    NEG: anorexia or night sweats   Genitourinary POS: LILA is present but it IS NOT effecting her ADL's, nocturia and it IS NOT effecting her daily living    NEG: dysuria or hematuria      Gastointestinal POS: nothing reported    NEG: bloating, change in bowel habits, melena or reflux symptoms   Integument POS: nothing reported and she sees her dermatologist for routine skins exams    NEG: moles that are changing in size, shape, color or rashes   Breast POS: nothing reported    NEG: persistent breast lump, skin dimpling or nipple discharge        Objective   BP  112/70   Resp 14   Wt 49 kg (108 lb)   LMP  (LMP Unknown)   BMI 19.75 kg/m²     General:  well developed; well nourished  no acute distress   Skin:  No suspicious lesions seen   Thyroid: normal to inspection and palpation   Breasts:  Examined in supine position  Symmetric without masses or skin dimpling  Nipples normal without inversion, lesions or discharge  There are no palpable axillary nodes   Abdomen: soft, non-tender; no masses  no umbilical or inguinal hernias are present  no hepato-splenomegaly   Pelvis: Clinical staff was present for exam  External genitalia:  normal appearance of the external genitalia including Bartholin's and [place]'s glands.  :  urethral meatus normal;  Vaginal:  atrophic mucosal changes are present;  Cervix:  normal appearance.  Uterus:  normal size, shape and consistency.  Adnexa:  normal bimanual exam of the adnexa.  Rectal:  digital rectal exam not performed; anus visually normal appearing.        Assessment   1. Normal GYN exam in menopause  2. FHx of breast CA Lifetime risk of breast cancer > 20% - genetic testing declined  3. FHx of ovarian cancer - genetic testing declined.  Patient already participates in [facility] ovarian cancer screening study  4. Family history of colon cancer/polyps - patient is up to date on screening colonoscopy  5. Osteoporosis - followed by rheumatology.  On Prolia ~ 5 years and last DEXA (2022) slightly improved.  6. Menopausal female currently not on HRT - without significant symptoms affecting activities of daily living  7. She is up to date on all relevant gynecologic and colorectal screenings     Plan   1. Pap was not done today.  I explained to Kimberly that the recommendations for Pap smear interval in a low risk patient has lengthened to 3 years time.  I told Kimberly she still needs to be seen in our office yearly for a full physical including breast and pelvic exam.  2. She was encouraged to get yearly mammograms along with yearly breast MRI.  The  studies should set up to stagger every 6 months.  She should report any palpable breast lump(s) or skin changes regardless of mammographic findings.  I explained to Kimberly that notification regarding her mammogram results will come from the center performing the study.  Our office will not be routinely calling with mammogram results.  It is her responsibility to make sure that the results from the mammogram are communicated to her by the breast center.  If she has any questions about the results, she is welcome to call our office anytime.  3. Have reviewed screening strategies for both breast and ovarian cancer.  Given her elevated lifetime risk I think she would warrant accelerated testing which would include annual breast exams combined with breast MRI and 6-month clinical breast exams.  Additionally chemoprophylaxis with tamoxifen or raloxifene also discussed as a relates to breast cancer prevention with a 50% risk reduction  4. As a relates to ovarian cancer, primary prevention with prophylactic salpingo-oophorectomy and secondary prevention with annual transvaginal ultrasounds explained.  After hearing this information, she is currently not interested in mastectomy or chemoprophylaxis but is interested in breast MRI.  As relates to ovarian cancer, she wants to consider her options as relates to prophylactic salpingo-oophorectomy or annual transvaginal ultrasounds.  She is already participating [facility] ovarian cancer screening study anyway.  5. Her vaccine record was reviewed and updated.  6. The importance of keeping all planned follow-up and taking all medications as prescribed was emphasized.  7. Follow up for clinical breast exam 6 months         This note was electronically signed.    Rico Vargas M.D.  January 3, 2023    Part of this note may be an electronic transcription/translation of spoken language to printed text using the Dragon Dictation System.

## 2023-01-03 ENCOUNTER — OFFICE VISIT (OUTPATIENT)
Dept: OBSTETRICS AND GYNECOLOGY | Facility: CLINIC | Age: 63
End: 2023-01-03
Payer: COMMERCIAL

## 2023-01-03 VITALS
SYSTOLIC BLOOD PRESSURE: 112 MMHG | RESPIRATION RATE: 14 BRPM | DIASTOLIC BLOOD PRESSURE: 70 MMHG | WEIGHT: 108 LBS | BODY MASS INDEX: 19.75 KG/M2

## 2023-01-03 DIAGNOSIS — Z80.41 FAMILY HISTORY OF OVARIAN CANCER: ICD-10-CM

## 2023-01-03 DIAGNOSIS — Z01.419 WELL WOMAN EXAM: Primary | ICD-10-CM

## 2023-01-03 DIAGNOSIS — M81.0 AGE-RELATED OSTEOPOROSIS WITHOUT CURRENT PATHOLOGICAL FRACTURE: ICD-10-CM

## 2023-01-03 DIAGNOSIS — Z80.0 FAMILY HISTORY OF COLON CANCER: ICD-10-CM

## 2023-01-03 DIAGNOSIS — Z80.3 FAMILY HISTORY OF BREAST CANCER IN MOTHER: ICD-10-CM

## 2023-01-03 PROCEDURE — 99396 PREV VISIT EST AGE 40-64: CPT | Performed by: OBSTETRICS & GYNECOLOGY

## 2023-01-03 NOTE — LETTER
January 3, 2023     Sherif Tabares DO  [address]  [facility] [address]    Patient: Kimberly Brito   YOB: 1960   Date of Visit: 1/3/2023       Dear Sherif Tabares DO    Kimberly Brito was in my office today. Below is a copy of my note.    If you have questions, please do not hesitate to call me. I look forward to following Kimberly along with you.         Sincerely,        Rico Vargas MD        CC: No Recipients    Subjective    Chief Complaint   Patient presents with   • Gynecologic Exam     Kimberly Brito is a 62 y.o. year old  menopausal female presenting to be seen for her annual exam.  Previously genetic consultation / screening was recommended.  She did see them but elected not to be tested.  She is still not interested in genetic testing.    This past year she has not been on hormone replacement therapy.  She has not had any vaginal bleeding in the last 12 months.  Menopausal symptoms are not present.    SEXUAL Hx:  She is currently sexually active.  In the past year there there has been NO new sexual partners.    Condoms are never used.  She would not like to be screened for STD's at today's exam.  [place] is painful: no  HEALTH Hx:  She exercises regularly: yes.  She wears her seat belt: yes.  She has concerns about domestic violence: no.  She has noticed changes in height: no.  OTHER THINGS SHE WANTS TO DISCUSS TODAY:  Nothing else    The following portions of the patient's history were reviewed and updated as appropriate:problem list, current medications, allergies, past family history, past medical history, past social history and past surgical history.    Social History    Tobacco Use      Smoking status: Never      Smokeless tobacco: Never      Tobacco comments: Parents smoked in the home      Review of Systems  Constitutional POS: nothing reported    NEG: anorexia or night sweats   Genitourinary POS: LILA is present but it IS NOT effecting her ADL's,  nocturia and it IS NOT effecting her daily living    NEG: dysuria or hematuria      Gastointestinal POS: nothing reported    NEG: bloating, change in bowel habits, melena or reflux symptoms   Integument POS: nothing reported and she sees her dermatologist for routine skins exams    NEG: moles that are changing in size, shape, color or rashes   Breast POS: nothing reported    NEG: persistent breast lump, skin dimpling or nipple discharge       Objective    /70   Resp 14   Wt 49 kg (108 lb)   LMP  (LMP Unknown)   BMI 19.75 kg/m²     General:  well developed; well nourished  no acute distress   Skin:  No suspicious lesions seen   Thyroid: normal to inspection and palpation   Breasts:  Examined in supine position  Symmetric without masses or skin dimpling  Nipples normal without inversion, lesions or discharge  There are no palpable axillary nodes   Abdomen: soft, non-tender; no masses  no umbilical or inguinal hernias are present  no hepato-splenomegaly   Pelvis: Clinical staff was present for exam  External genitalia:  normal appearance of the external genitalia including Bartholin's and [place]'s glands.  :  urethral meatus normal;  Vaginal:  atrophic mucosal changes are present;  Cervix:  normal appearance.  Uterus:  normal size, shape and consistency.  Adnexa:  normal bimanual exam of the adnexa.  Rectal:  digital rectal exam not performed; anus visually normal appearing.       Assessment    1. Normal GYN exam in menopause  2. FHx of breast CA Lifetime risk of breast cancer > 20% - genetic testing declined  3. FHx of ovarian cancer - genetic testing declined.  Patient already participates in [facility] ovarian cancer screening study  4. Family history of colon cancer/polyps - patient is up to date on screening colonoscopy  5. Osteoporosis - followed by rheumatology.  On Prolia ~ 5 years and last DEXA (2022) slightly improved.  6. Menopausal female currently not on HRT - without significant symptoms affecting  activities of daily living  7. She is up to date on all relevant gynecologic and colorectal screenings    Plan    1. Pap was not done today.  I explained to Kimberly that the recommendations for Pap smear interval in a low risk patient has lengthened to 3 years time.  I told Kimberly she still needs to be seen in our office yearly for a full physical including breast and pelvic exam.  2. She was encouraged to get yearly mammograms along with yearly breast MRI.  The studies should set up to stagger every 6 months.  She should report any palpable breast lump(s) or skin changes regardless of mammographic findings.  I explained to Kimberly that notification regarding her mammogram results will come from the center performing the study.  Our office will not be routinely calling with mammogram results.  It is her responsibility to make sure that the results from the mammogram are communicated to her by the breast center.  If she has any questions about the results, she is welcome to call our office anytime.  3. Have reviewed screening strategies for both breast and ovarian cancer.  Given her elevated lifetime risk I think she would warrant accelerated testing which would include annual breast exams combined with breast MRI and 6-month clinical breast exams.  Additionally chemoprophylaxis with tamoxifen or raloxifene also discussed as a relates to breast cancer prevention with a 50% risk reduction  4. As a relates to ovarian cancer, primary prevention with prophylactic salpingo-oophorectomy and secondary prevention with annual transvaginal ultrasounds explained.  After hearing this information, she is currently not interested in mastectomy or chemoprophylaxis but is interested in breast MRI.  As relates to ovarian cancer, she wants to consider her options as relates to prophylactic salpingo-oophorectomy or annual transvaginal ultrasounds.  She is already participating [facility] ovarian cancer screening study anyway.  5. Her vaccine record  was reviewed and updated.  6. The importance of keeping all planned follow-up and taking all medications as prescribed was emphasized.  7. Follow up for clinical breast exam 6 months        This note was electronically signed.    Rico Vargas M.D.  January 3, 2023    Part of this note may be an electronic transcription/translation of spoken language to printed text using the Dragon Dictation System.

## 2023-01-03 NOTE — PATIENT INSTRUCTIONS
If you want to learn about the studies with the 2 medicines we discussed (tamoxifen and raloxifene), would encourage you to look up the NSABP 1 and NSABP 2 trial along with the study of tamoxifen and raloxifene (STAR study).

## 2023-03-09 ENCOUNTER — TELEPHONE (OUTPATIENT)
Dept: MRI IMAGING | Facility: HOSPITAL | Age: 63
End: 2023-03-09
Payer: COMMERCIAL

## 2023-03-09 NOTE — TELEPHONE ENCOUNTER
Patient has to have MMG within 6 months of an MRI. Due in May for MMG. Spoke with patient and she is going to get that scheduled and then call back to schedule the MRI. She is able to get on a 6 month rotation.

## 2023-03-21 ENCOUNTER — TRANSCRIBE ORDERS (OUTPATIENT)
Dept: ADMINISTRATIVE | Facility: HOSPITAL | Age: 63
End: 2023-03-21
Payer: COMMERCIAL

## 2023-03-21 DIAGNOSIS — Z12.31 VISIT FOR SCREENING MAMMOGRAM: Primary | ICD-10-CM

## 2023-05-25 ENCOUNTER — HOSPITAL ENCOUNTER (OUTPATIENT)
Dept: MAMMOGRAPHY | Facility: HOSPITAL | Age: 63
Discharge: HOME | End: 2023-05-25
Admitting: OBSTETRICS & GYNECOLOGY
Payer: COMMERCIAL

## 2023-05-25 DIAGNOSIS — Z12.31 VISIT FOR SCREENING MAMMOGRAM: ICD-10-CM

## 2023-05-25 PROCEDURE — 77063 BREAST TOMOSYNTHESIS BI: CPT

## 2023-05-25 PROCEDURE — 77067 SCR MAMMO BI INCL CAD: CPT

## 2023-09-06 ENCOUNTER — INFUSION (OUTPATIENT)
Dept: ONCOLOGY | Facility: HOSPITAL | Age: 63
End: 2023-09-06
Payer: COMMERCIAL

## 2023-09-06 VITALS
RESPIRATION RATE: 15 BRPM | DIASTOLIC BLOOD PRESSURE: 75 MMHG | HEART RATE: 80 BPM | SYSTOLIC BLOOD PRESSURE: 127 MMHG | TEMPERATURE: 97 F

## 2023-09-06 DIAGNOSIS — M81.0 OSTEOPOROSIS, UNSPECIFIED OSTEOPOROSIS TYPE, UNSPECIFIED PATHOLOGICAL FRACTURE PRESENCE: Primary | ICD-10-CM

## 2023-09-06 PROCEDURE — 96372 THER/PROPH/DIAG INJ SC/IM: CPT

## 2023-09-06 PROCEDURE — 25010000002 DENOSUMAB 60 MG/ML SOLUTION PREFILLED SYRINGE: Performed by: INTERNAL MEDICINE

## 2023-09-06 RX ADMIN — DENOSUMAB 60 MG: 60 INJECTION SUBCUTANEOUS at 14:37

## 2023-10-18 ENCOUNTER — TELEPHONE (OUTPATIENT)
Dept: FAMILY MEDICINE CLINIC | Facility: CLINIC | Age: 63
End: 2023-10-18

## 2023-10-18 NOTE — TELEPHONE ENCOUNTER
Provider:     DR SEVILLA    Caller:     PATIENT    Phone Number:     [phone] (Mobile)     Reason for Call:     CALLER REQUESTED PHYSICAL VISIT TYPE TO BE SCHEDULED FOR 1/2024    DR SEVILLA WAS NOT AVAILABLE FOR THIS TYPE OF VISIT UNTIL 4/2024    PATIENT IS SCHEDULED WITH LOIS PERERA FOR PHYSICAL APPOINTMENT ON 1/2/24 AT 8:45

## 2023-12-19 ENCOUNTER — TELEPHONE (OUTPATIENT)
Dept: FAMILY MEDICINE CLINIC | Facility: CLINIC | Age: 63
End: 2023-12-19

## 2023-12-19 DIAGNOSIS — E78.2 MIXED HYPERLIPIDEMIA: ICD-10-CM

## 2023-12-19 DIAGNOSIS — M81.0 AGE-RELATED OSTEOPOROSIS WITHOUT CURRENT PATHOLOGICAL FRACTURE: Primary | ICD-10-CM

## 2023-12-19 NOTE — TELEPHONE ENCOUNTER
"Caller: Kimberly Brito \"Kimberly Morel\"    Relationship: Self    Best call back number: [phone]    What orders are you requesting (i.e. lab or imaging): LABS    In what timeframe would the patient need to come in: BEFORE 1/2/24 APPOINTMENT   PREFERABLY 12/22/23    Additional notes:     PLEASE CALL PATIENT WHEN LAB ORDERS HAVE BEEN PUT IN.  SHE WOULD LIKE TO COME IN 12/22 AND HAVE LABS DRAWN IF POSSIBLE      "

## 2023-12-22 ENCOUNTER — LAB (OUTPATIENT)
Dept: LAB | Facility: HOSPITAL | Age: 63
End: 2023-12-22
Payer: COMMERCIAL

## 2023-12-22 DIAGNOSIS — E78.2 MIXED HYPERLIPIDEMIA: ICD-10-CM

## 2023-12-22 DIAGNOSIS — M81.0 AGE-RELATED OSTEOPOROSIS WITHOUT CURRENT PATHOLOGICAL FRACTURE: ICD-10-CM

## 2023-12-22 LAB
25(OH)D3 SERPL-MCNC: 51.1 NG/ML (ref 30–100)
ALBUMIN SERPL-MCNC: 4.6 G/DL (ref 3.5–5.2)
ALBUMIN/GLOB SERPL: 1.8 G/DL
ALP SERPL-CCNC: 41 U/L (ref 39–117)
ALT SERPL W P-5'-P-CCNC: 15 U/L (ref 1–33)
ANION GAP SERPL CALCULATED.3IONS-SCNC: 13 MMOL/L (ref 5–15)
AST SERPL-CCNC: 33 U/L (ref 1–32)
BILIRUB SERPL-MCNC: 0.6 MG/DL (ref 0–1.2)
BUN SERPL-MCNC: 11 MG/DL (ref 8–23)
BUN/CREAT SERPL: 10.2 (ref 7–25)
CALCIUM SPEC-SCNC: 9.1 MG/DL (ref 8.6–10.5)
CHLORIDE SERPL-SCNC: 102 MMOL/L (ref 98–107)
CHOLEST SERPL-MCNC: 292 MG/DL (ref 0–200)
CO2 SERPL-SCNC: 23 MMOL/L (ref 22–29)
CREAT SERPL-MCNC: 1.08 MG/DL (ref 0.57–1)
DEPRECATED RDW RBC AUTO: 41.8 FL (ref 37–54)
EGFRCR SERPLBLD CKD-EPI 2021: 58.2 ML/MIN/1.73
ERYTHROCYTE [DISTWIDTH] IN BLOOD BY AUTOMATED COUNT: 13.1 % (ref 12.3–15.4)
GLOBULIN UR ELPH-MCNC: 2.5 GM/DL
GLUCOSE SERPL-MCNC: 89 MG/DL (ref 65–99)
HBA1C MFR BLD: 5.1 % (ref 4.8–5.6)
HCT VFR BLD AUTO: 40.9 % (ref 34–46.6)
HDLC SERPL-MCNC: 101 MG/DL (ref 40–60)
HGB BLD-MCNC: 13.5 G/DL (ref 12–15.9)
LDLC SERPL CALC-MCNC: 182 MG/DL (ref 0–100)
LDLC/HDLC SERPL: 1.77 {RATIO}
MCH RBC QN AUTO: 29.2 PG (ref 26.6–33)
MCHC RBC AUTO-ENTMCNC: 33 G/DL (ref 31.5–35.7)
MCV RBC AUTO: 88.5 FL (ref 79–97)
PLATELET # BLD AUTO: 162 10*3/MM3 (ref 140–450)
PMV BLD AUTO: 10.3 FL (ref 6–12)
POTASSIUM SERPL-SCNC: 4.1 MMOL/L (ref 3.5–5.2)
PROT SERPL-MCNC: 7.1 G/DL (ref 6–8.5)
RBC # BLD AUTO: 4.62 10*6/MM3 (ref 3.77–5.28)
SODIUM SERPL-SCNC: 138 MMOL/L (ref 136–145)
T4 FREE SERPL-MCNC: 1.01 NG/DL (ref 0.93–1.7)
TRIGL SERPL-MCNC: 61 MG/DL (ref 0–150)
TSH SERPL DL<=0.05 MIU/L-ACNC: 4.22 UIU/ML (ref 0.27–4.2)
VIT B12 BLD-MCNC: 494 PG/ML (ref 211–946)
VLDLC SERPL-MCNC: 9 MG/DL (ref 5–40)
WBC NRBC COR # BLD AUTO: 3.86 10*3/MM3 (ref 3.4–10.8)

## 2023-12-22 PROCEDURE — 80061 LIPID PANEL: CPT

## 2023-12-22 PROCEDURE — 82306 VITAMIN D 25 HYDROXY: CPT

## 2023-12-22 PROCEDURE — 85027 COMPLETE CBC AUTOMATED: CPT

## 2023-12-22 PROCEDURE — 36415 COLL VENOUS BLD VENIPUNCTURE: CPT

## 2023-12-22 PROCEDURE — 84443 ASSAY THYROID STIM HORMONE: CPT

## 2023-12-22 PROCEDURE — 83036 HEMOGLOBIN GLYCOSYLATED A1C: CPT

## 2023-12-22 PROCEDURE — 84439 ASSAY OF FREE THYROXINE: CPT

## 2023-12-22 PROCEDURE — 80053 COMPREHEN METABOLIC PANEL: CPT

## 2023-12-22 PROCEDURE — 82607 VITAMIN B-12: CPT

## 2024-01-02 ENCOUNTER — OFFICE VISIT (OUTPATIENT)
Dept: FAMILY MEDICINE CLINIC | Facility: CLINIC | Age: 64
End: 2024-01-02
Payer: COMMERCIAL

## 2024-01-02 VITALS
OXYGEN SATURATION: 99 % | WEIGHT: 113 LBS | SYSTOLIC BLOOD PRESSURE: 136 MMHG | HEIGHT: 62 IN | TEMPERATURE: 97.1 F | DIASTOLIC BLOOD PRESSURE: 76 MMHG | HEART RATE: 94 BPM | BODY MASS INDEX: 20.8 KG/M2

## 2024-01-02 DIAGNOSIS — M81.0 OSTEOPOROSIS WITHOUT CURRENT PATHOLOGICAL FRACTURE, UNSPECIFIED OSTEOPOROSIS TYPE: ICD-10-CM

## 2024-01-02 DIAGNOSIS — Z00.00 ANNUAL PHYSICAL EXAM: Primary | ICD-10-CM

## 2024-01-02 DIAGNOSIS — Z12.83 SKIN EXAM, SCREENING FOR CANCER: ICD-10-CM

## 2024-01-02 PROCEDURE — 99396 PREV VISIT EST AGE 40-64: CPT | Performed by: PHYSICIAN ASSISTANT

## 2024-01-02 NOTE — PROGRESS NOTES
"     Female Physical Note      Date: 2024   Patient Name: Kimberly Brito  : 1960   MRN: [id number]     Chief Complaint:    Chief Complaint   Patient presents with    Annual Exam       History of Present Illness: Kimberly Brito is a 63 y.o. female who is here today for their annual health maintenance and physical.     The patient states she feels \"fine.\"    She is still on Prolia for her osteoporosis. She has been seeing Dr. Tabares and receiving her Prolia injections at his office. Her insurance no longer contracts with him, but [facility] is a provider. She asks for a referral. The patient was scheduled to see Dr. Tabares in 2 weeks and is due for Prolia a month later.    The patient requests a referral to a dermatologist. She had melanoma more than 20 years ago and has a skin examination yearly. She also has a rash on her face.    She fractured her foot in the past.    The patient states she has a \"bad sensitivity\" in her ears.    She does not sleep well.    She has occasional hip pain.    She exercises a lot, and occasionally gets a pain on the left side of her back when she tries to sit up. She is unable to identify the location at present because it does not currently hurt.    The patient mentions a lesion on her left foot it has reduced in size.    The patient denies any history of thyroid issues. An ultrasound ordered by Dr. Velarde revealed a normal thyroid.    The patient does not take a lot of ibuprofen or Aleve. She does not drink water.    She sees a gynecologist.     She denies any changes in vision. Her appetite is good. She denies any stomach issues or constipation. She denies any knee pain or swelling.     Her maternal grandmother, aunt, uncle, and cousins had polycystic kidneys.       Subjective      Review of Systems:   Review of Systems   Constitutional:  Negative for appetite change, fatigue and fever.   HENT:  Negative for trouble swallowing.         \"Sensitivity\" in bilateral " ears.   Eyes:  Negative for visual disturbance.   Respiratory:  Negative for cough and shortness of breath.    Cardiovascular:  Negative for chest pain and leg swelling.   Gastrointestinal:  Negative for abdominal pain, constipation, GERD and indigestion.   Musculoskeletal:  Positive for arthralgias. Negative for joint swelling.   Skin:  Positive for rash and skin lesions.   Psychiatric/Behavioral:  Positive for sleep disturbance.        Past Medical History, Social History, Family History and Care Team were all reviewed with patient and updated as appropriate.     Medications:     Current Outpatient Medications:     Biotin 10 MG tablet, Take 1 capsule by mouth., Disp: , Rfl:     calcium acetate (PHOSLO) 667 MG capsule, Take 2 capsules by mouth 3 (Three) Times a Day With Meals., Disp: , Rfl:     cholecalciferol (VITAMIN D3) 1000 units tablet, Take 2 tablets by mouth Daily., Disp: , Rfl:     clobetasol (TEMOVATE) 0.05 % external solution, , Disp: , Rfl:     denosumab (Prolia) 60 MG/ML solution prefilled syringe syringe, Inject 1 mL under the skin into the appropriate area as directed Every 6 (Six) Months., Disp: , Rfl:     desonide (DESOWEN) 0.05 % ointment, Apply 1 application  topically to the appropriate area as directed 2 (Two) Times a Day., Disp: , Rfl:     doxycycline (VIBRAMYCIN) 100 MG capsule, , Disp: , Rfl:     hydrocortisone 2.5 % ointment, Apply 1 application  topically to the appropriate area as directed 2 (Two) Times a Day., Disp: , Rfl:     Multiple Vitamins-Minerals (MULTIVITAMIN ADULT PO), Take 1 tablet by mouth Daily., Disp: , Rfl:     Allergies:   No Known Allergies    PHQ-9 Depression Screening  Little interest or pleasure in doing things? 0-->not at all   Feeling down, depressed, or hopeless? 0-->not at all   Trouble falling or staying asleep, or sleeping too much?     Feeling tired or having little energy?     Poor appetite or overeating?     Feeling bad about yourself - or that you are a  "failure or have let yourself or your family down?     Trouble concentrating on things, such as reading the newspaper or watching television?     Moving or speaking so slowly that other people could have noticed? Or the opposite - being so fidgety or restless that you have been moving around a lot more than usual?     Thoughts that you would be better off dead, or of hurting yourself in some way?     PHQ-9 Total Score 0   If you checked off any problems, how difficult have these problems made it for you to do your work, take care of things at home, or get along with other people?             Objective     Vital Signs:   Vitals:    01/02/24 0835   BP: 136/76   Pulse: 94   Temp: 97.1 °F (36.2 °C)   SpO2: 99%   Weight: 51.3 kg (113 lb)   Height: 157.5 cm (62\")     Body mass index is 20.67 kg/m².   BMI is within normal parameters. No other follow-up for BMI required.      Physical Exam:   Physical Exam  Vitals and nursing note reviewed.   Constitutional:       General: She is not in acute distress.     Appearance: Normal appearance. She is well-developed.   HENT:      Head: Normocephalic and atraumatic.      Right Ear: Tympanic membrane and ear canal normal. There is no impacted cerumen.      Left Ear: Tympanic membrane and ear canal normal. There is no impacted cerumen.      Nose: Nose normal. No congestion or rhinorrhea.      Mouth/Throat:      Mouth: Mucous membranes are moist.      Pharynx: Oropharynx is clear. No oropharyngeal exudate or posterior oropharyngeal erythema.   Eyes:      General: No scleral icterus.        Right eye: No discharge.         Left eye: No discharge.      Extraocular Movements: Extraocular movements intact.      Conjunctiva/sclera: Conjunctivae normal.      Pupils: Pupils are equal, round, and reactive to light.   Neck:      Thyroid: No thyromegaly.      Vascular: No carotid bruit.   Cardiovascular:      Rate and Rhythm: Normal rate and regular rhythm.      Heart sounds: Normal heart sounds. " No murmur heard.  Pulmonary:      Breath sounds: Normal breath sounds. No wheezing, rhonchi or rales.   Abdominal:      General: Bowel sounds are normal. There is no distension.      Palpations: Abdomen is soft. There is no mass.      Tenderness: There is no abdominal tenderness.   Musculoskeletal:         General: No swelling. Normal range of motion.      Cervical back: Normal range of motion and neck supple.      Right lower leg: No edema.      Left lower leg: No edema.   Lymphadenopathy:      Cervical: No cervical adenopathy.   Skin:     General: Skin is warm.      Coloration: Skin is not jaundiced or pale.      Findings: No bruising or rash.      Comments: Small plantars wart left foot, nearly resolved.   Neurological:      General: No focal deficit present.      Mental Status: She is alert.      Cranial Nerves: No cranial nerve deficit.      Motor: No weakness.      Gait: Gait normal.   Psychiatric:         Mood and Affect: Mood normal.         Behavior: Behavior normal.         Judgment: Judgment normal.          Procedures    Assessment / Plan      Assessment/Plan:   Diagnoses and all orders for this visit:    1. Annual physical exam (Primary)    2. Osteoporosis without current pathological fracture, unspecified osteoporosis type  -     Ambulatory Referral to Endocrinology    3. Skin exam, screening for cancer  -     Ambulatory Referral to Dermatology    PLAN  We went over her recent lab results, which were almost all good. She does have some slightly elevated creatinine at 1.08 mg/dL, but does not drink a lot of water, so I encouraged her to increase her fluid intake, watch her sodium level, and limit use of ibuprofen and Aleve, which she does not take much. HbA1c was normal, blood counts were normal. Vitamin B12 and vitamin D looked good. She has historically had elevated cholesterol and LDL levels; however, her HDL level is also high and so far with that ratio, she does not quite need a statin yet;  however, we will continue to monitor this. For the most part, those levels are fairly stable with cholesterol being just a couple of points higher this year than last.     She does see Dr. Vargas for her GYN care. She is up to date on all other preventative care at this time. Due to insurance changes, she does need a new provider for her Prolia. Will refer to Endocrinology for osteoporosis treatment. Also referred to Dermatology for annual skin exam.      Follow Up:   No follow-ups on file.    Healthcare Maintenance:   Counseling provided on keeping well hydrated, fiber intake, preventative recommendations.   Kimberly Brito voices understanding and acceptance of this advice and will call back with any further questions or concerns. AVS with preventive healthcare tips printed for patient.     Kelly Mahoney PA-C   [facility]     Transcribed from ambient dictation for Kelly Mahoney PA-C by Mariela Ross.  01/02/24   10:03 EST    Patient or patient representative verbalized consent to the visit recording.  I have personally performed the services described in this document as transcribed by the above individual, and it is both accurate and complete.

## 2024-01-09 ENCOUNTER — HOSPITAL ENCOUNTER (OUTPATIENT)
Dept: MRI IMAGING | Facility: HOSPITAL | Age: 64
Discharge: HOME | End: 2024-01-09
Admitting: OBSTETRICS & GYNECOLOGY
Payer: COMMERCIAL

## 2024-01-09 DIAGNOSIS — Z80.3 FAMILY HISTORY OF BREAST CANCER IN MOTHER: ICD-10-CM

## 2024-01-09 PROCEDURE — 0 GADOBENATE DIMEGLUMINE 529 MG/ML SOLUTION: Performed by: OBSTETRICS & GYNECOLOGY

## 2024-01-09 PROCEDURE — 77049 MRI BREAST C-+ W/CAD BI: CPT

## 2024-01-09 PROCEDURE — A9577 INJ MULTIHANCE: HCPCS | Performed by: OBSTETRICS & GYNECOLOGY

## 2024-01-09 RX ADMIN — GADOBENATE DIMEGLUMINE 9 ML: 529 INJECTION, SOLUTION INTRAVENOUS at 09:54

## 2024-01-17 ENCOUNTER — TELEPHONE (OUTPATIENT)
Dept: FAMILY MEDICINE CLINIC | Facility: CLINIC | Age: 64
End: 2024-01-17

## 2024-01-23 ENCOUNTER — OFFICE VISIT (OUTPATIENT)
Dept: ENDOCRINOLOGY | Facility: CLINIC | Age: 64
End: 2024-01-23
Payer: COMMERCIAL

## 2024-01-23 ENCOUNTER — TELEPHONE (OUTPATIENT)
Dept: ENDOCRINOLOGY | Facility: CLINIC | Age: 64
End: 2024-01-23

## 2024-01-23 VITALS
OXYGEN SATURATION: 97 % | DIASTOLIC BLOOD PRESSURE: 74 MMHG | SYSTOLIC BLOOD PRESSURE: 124 MMHG | BODY MASS INDEX: 20.61 KG/M2 | HEIGHT: 62 IN | HEART RATE: 77 BPM | WEIGHT: 112 LBS

## 2024-01-23 DIAGNOSIS — M81.0 AGE-RELATED OSTEOPOROSIS WITHOUT CURRENT PATHOLOGICAL FRACTURE: Primary | ICD-10-CM

## 2024-01-23 PROCEDURE — 99203 OFFICE O/P NEW LOW 30 MIN: CPT | Performed by: PHYSICIAN ASSISTANT

## 2024-01-23 NOTE — TELEPHONE ENCOUNTER
Please request results from most recent DEXA scan from [facility]- maybe from 2022. Thank you

## 2024-01-23 NOTE — ASSESSMENT & PLAN NOTE
Will request most recent DEXA scan results from [facility]. Plan to repeat q 2 years and will reestablish baseline at [facility] facility. Discussed importance of getting scan in the same location each time.    Plan to continue Prolia- has been well tolerated and effective, with evidence of bone density improvement while on medication and evidence of worsening when off the medication. Due for next Prolia injection on 3/6/2024. Patient will contact insurance to see if Prolia should be sent to [facility] order specialty [facility], and if medication will be shipped to patient or office.    Continue current dose of vitamin D, normal level of 51 on 12/22/23. Continue multivitamin and add Calcium supplement for total of 1535-3311 mg of calcium daily. Continue weight bearing exercise.    Follow up after repeat DEXA scan, date TBD.

## 2024-01-23 NOTE — PROGRESS NOTES
Chief Complaint   Patient presents with    Osteoporosis       Subjective     Kimberly Brito is a 63 y.o. female.        History of Present Illness     Pt was diagnosed with Osteoporosis around 2014, per chart t-score -2.6 in femoral neck. She was initially on fosamax but thought she may have had a rash related to the medicine. Still has the rash and seeing Dermatology. Otherwise she did well with it, no GI side effects. Took it for 2 years before she switched to Prolia around 2016, has been on taking for 7-8 years. Was also on hormone therapy for a few years before she stopped due to abnormal mammogram.     Was seeing Dr. Tabares at the [facility] and getting Prolia q 6 months. Last shot was September 6, 2023 at [facility], was unable to get Prolia at [facility] because it was no longer on their formulary. Cost at [facility] was unaffordable for patient. Pt's insurance also changed this year. Prolia is on her formulary.    Went without Prolia for a year at some point (maybe around 2018) and T-score dropped.     Has had DEXA scans at both [facility] and [facility]. Last one in chart was 2020, she thinks she has had one since then.    Since osteoporosis diagnosis, had a hairline fracture in her foot, no injury. Likely overuse from a job where she was walking for a long time. Has fallen without injury.    Mother and maternal aunts with osteoporosis, no fractures.    Went through menopause at age 51.     Pt takes a multivitamin but does not take the extra calcium tablet that has been recommended. Vitamin D is normal. 12/2023. She does weight bearing exercise frequently.      Current Outpatient Medications:     Biotin 10 MG tablet, Take 1 capsule by mouth., Disp: , Rfl:     calcium acetate (PHOSLO) 667 MG capsule, Take 2 capsules by mouth 3 (Three) Times a Day With Meals., Disp: , Rfl:     cholecalciferol (VITAMIN D3) 1000 units tablet, Take 2 tablets by mouth Daily., Disp: , Rfl:      "clobetasol (TEMOVATE) 0.05 % external solution, , Disp: , Rfl:     denosumab (Prolia) 60 MG/ML solution prefilled syringe syringe, Inject 1 mL under the skin into the appropriate area as directed Every 6 (Six) Months., Disp: , Rfl:     desonide (DESOWEN) 0.05 % ointment, Apply 1 application  topically to the appropriate area as directed 2 (Two) Times a Day., Disp: , Rfl:     doxycycline (VIBRAMYCIN) 100 MG capsule, , Disp: , Rfl:     hydrocortisone 2.5 % ointment, Apply 1 application  topically to the appropriate area as directed 2 (Two) Times a Day., Disp: , Rfl:     Multiple Vitamins-Minerals (MULTIVITAMIN ADULT PO), Take 1 tablet by mouth Daily., Disp: , Rfl:      PMFSH  The following portions of the patient's history were reviewed and updated as appropriate: allergies, current medications, past family history, past medical history, past social history, past surgical history, and problem list.    Review of Systems   Constitutional:  Negative for activity change and appetite change.   Gastrointestinal:  Negative for abdominal pain and nausea.   Musculoskeletal:  Negative for arthralgias and myalgias.       Objective   /74   Pulse 77   Ht 157.5 cm (62\")   Wt 50.8 kg (112 lb)   LMP  (LMP Unknown)   SpO2 97%   BMI 20.49 kg/m²     Physical Exam  Constitutional:       Appearance: She is normal weight.   HENT:      Head: Normocephalic.      Right Ear: External ear normal.      Left Ear: External ear normal.   Cardiovascular:      Rate and Rhythm: Normal rate.   Pulmonary:      Effort: Pulmonary effort is normal.   Skin:     General: Skin is warm and dry.   Neurological:      Mental Status: She is alert and oriented to person, place, and time.   Psychiatric:         Mood and Affect: Mood normal.         Thought Content: Thought content normal.         Judgment: Judgment normal.         Results for orders placed or performed in visit on 12/22/23   Comprehensive metabolic panel    Specimen: Blood   Result Value " Ref Range    Glucose 89 65 - 99 mg/dL    BUN 11 8 - 23 mg/dL    Creatinine 1.08 (H) 0.57 - 1.00 mg/dL    Sodium 138 136 - 145 mmol/L    Potassium 4.1 3.5 - 5.2 mmol/L    Chloride 102 98 - 107 mmol/L    CO2 23.0 22.0 - 29.0 mmol/L    Calcium 9.1 8.6 - 10.5 mg/dL    Total Protein 7.1 6.0 - 8.5 g/dL    Albumin 4.6 3.5 - 5.2 g/dL    ALT (SGPT) 15 1 - 33 U/L    AST (SGOT) 33 (H) 1 - 32 U/L    Alkaline Phosphatase 41 39 - 117 U/L    Total Bilirubin 0.6 0.0 - 1.2 mg/dL    Globulin 2.5 gm/dL    A/G Ratio 1.8 g/dL    BUN/Creatinine Ratio 10.2 7.0 - 25.0    Anion Gap 13.0 5.0 - 15.0 mmol/L    eGFR 58.2 (L) >60.0 mL/min/1.73   CBC No Differential    Specimen: Blood   Result Value Ref Range    WBC 3.86 3.40 - 10.80 10*3/mm3    RBC 4.62 3.77 - 5.28 10*6/mm3    Hemoglobin 13.5 12.0 - 15.9 g/dL    Hematocrit 40.9 34.0 - 46.6 %    MCV 88.5 79.0 - 97.0 fL    MCH 29.2 26.6 - 33.0 pg    MCHC 33.0 31.5 - 35.7 g/dL    RDW 13.1 12.3 - 15.4 %    RDW-SD 41.8 37.0 - 54.0 fl    MPV 10.3 6.0 - 12.0 fL    Platelets 162 140 - 450 10*3/mm3   Lipid panel    Specimen: Blood   Result Value Ref Range    Total Cholesterol 292 (H) 0 - 200 mg/dL    Triglycerides 61 0 - 150 mg/dL    HDL Cholesterol 101 (H) 40 - 60 mg/dL    LDL Cholesterol  182 (H) 0 - 100 mg/dL    VLDL Cholesterol 9 5 - 40 mg/dL    LDL/HDL Ratio 1.77    T4, free    Specimen: Blood   Result Value Ref Range    Free T4 1.01 0.93 - 1.70 ng/dL   TSH    Specimen: Blood   Result Value Ref Range    TSH 4.220 (H) 0.270 - 4.200 uIU/mL   Vitamin B12    Specimen: Blood   Result Value Ref Range    Vitamin B-12 494 211 - 946 pg/mL   Vitamin D 25 hydroxy    Specimen: Blood   Result Value Ref Range    25 Hydroxy, Vitamin D 51.1 30.0 - 100.0 ng/ml   Hemoglobin A1c    Specimen: Blood   Result Value Ref Range    Hemoglobin A1C 5.10 4.80 - 5.60 %        ASSESSMENT/PLAN    Diagnoses and all orders for this visit:    1. Age-related osteoporosis without current pathological fracture (Primary)  Assessment &  Plan:  Will request most recent DEXA scan results from [facility]. Plan to repeat q 2 years and will reestablish baseline at [facility] facility. Discussed importance of getting scan in the same location each time.    Plan to continue Prolia- has been well tolerated and effective, with evidence of bone density improvement while on medication and evidence of worsening when off the medication. Due for next Prolia injection on 3/6/2024. Patient will contact insurance to see if Prolia should be sent to [facility] order [facility], and if medication will be shipped to patient or office.    Continue current dose of vitamin D, normal level of 51 on 12/22/23. Continue multivitamin and add Calcium supplement for total of 7704-6304 mg of calcium daily. Continue weight bearing exercise.    Follow up after repeat DEXA scan, date TBD.               Return in about 6 months (around 7/23/2024) for Follow up.

## 2024-01-24 ENCOUNTER — TELEPHONE (OUTPATIENT)
Dept: ENDOCRINOLOGY | Facility: CLINIC | Age: 64
End: 2024-01-24

## 2024-01-24 NOTE — TELEPHONE ENCOUNTER
Patient is calling to check the status of her Prolia shot.       She also states she called her insurance to ask where to send the shot and they told her a pharmacy associated with [facility].

## 2024-02-06 ENCOUNTER — PATIENT MESSAGE (OUTPATIENT)
Dept: ENDOCRINOLOGY | Facility: CLINIC | Age: 64
End: 2024-02-06
Payer: COMMERCIAL

## 2024-02-06 DIAGNOSIS — M81.0 AGE-RELATED OSTEOPOROSIS WITHOUT CURRENT PATHOLOGICAL FRACTURE: Primary | ICD-10-CM

## 2024-02-06 RX ORDER — DENOSUMAB 60 MG/ML
60 INJECTION SUBCUTANEOUS
Qty: 180 ML | Refills: 3 | Status: SHIPPED | OUTPATIENT
Start: 2024-02-06

## 2024-02-14 ENCOUNTER — TELEPHONE (OUTPATIENT)
Dept: ENDOCRINOLOGY | Facility: CLINIC | Age: 64
End: 2024-02-14

## 2024-02-14 NOTE — TELEPHONE ENCOUNTER
Caller: [id number], [place], [place] - [address], [address] - [phone]  - [phone]    Relationship to patient: Pharmacy    Best call back number: FAX:[phone]    Patient is needing: PLEASE FAX PA FOR PROLIA TO THIS NUMBER FOR INSURANCE

## 2024-02-19 ENCOUNTER — PRIOR AUTHORIZATION (OUTPATIENT)
Dept: ENDOCRINOLOGY | Facility: CLINIC | Age: 64
End: 2024-02-19
Payer: COMMERCIAL

## 2024-04-02 ENCOUNTER — CLINICAL SUPPORT (OUTPATIENT)
Dept: ENDOCRINOLOGY | Facility: CLINIC | Age: 64
End: 2024-04-02
Payer: COMMERCIAL

## 2024-04-02 DIAGNOSIS — M81.0 AGE-RELATED OSTEOPOROSIS WITHOUT CURRENT PATHOLOGICAL FRACTURE: Primary | ICD-10-CM

## 2024-04-02 PROCEDURE — 96372 THER/PROPH/DIAG INJ SC/IM: CPT | Performed by: PHYSICIAN ASSISTANT

## 2024-05-10 ENCOUNTER — OFFICE VISIT (OUTPATIENT)
Dept: FAMILY MEDICINE CLINIC | Facility: CLINIC | Age: 64
End: 2024-05-10
Payer: COMMERCIAL

## 2024-05-10 VITALS
BODY MASS INDEX: 20.91 KG/M2 | OXYGEN SATURATION: 99 % | TEMPERATURE: 98.2 F | DIASTOLIC BLOOD PRESSURE: 78 MMHG | HEART RATE: 83 BPM | HEIGHT: 62 IN | SYSTOLIC BLOOD PRESSURE: 126 MMHG | WEIGHT: 113.6 LBS

## 2024-05-10 DIAGNOSIS — M25.551 RIGHT HIP PAIN: Primary | ICD-10-CM

## 2024-05-10 RX ORDER — MINOCYCLINE HYDROCHLORIDE 100 MG/1
100 CAPSULE ORAL DAILY
COMMUNITY

## 2024-05-12 PROBLEM — M25.551 RIGHT HIP PAIN: Status: ACTIVE | Noted: 2024-05-12

## 2024-05-21 ENCOUNTER — TRANSCRIBE ORDERS (OUTPATIENT)
Dept: ADMINISTRATIVE | Facility: HOSPITAL | Age: 64
End: 2024-05-21
Payer: COMMERCIAL

## 2024-05-21 DIAGNOSIS — Z12.31 BREAST CANCER SCREENING BY MAMMOGRAM: Primary | ICD-10-CM

## 2024-07-16 ENCOUNTER — OFFICE VISIT (OUTPATIENT)
Dept: ENDOCRINOLOGY | Facility: CLINIC | Age: 64
End: 2024-07-16
Payer: COMMERCIAL

## 2024-07-16 VITALS
BODY MASS INDEX: 20.43 KG/M2 | SYSTOLIC BLOOD PRESSURE: 120 MMHG | OXYGEN SATURATION: 97 % | HEART RATE: 72 BPM | WEIGHT: 111 LBS | DIASTOLIC BLOOD PRESSURE: 70 MMHG | HEIGHT: 62 IN

## 2024-07-16 DIAGNOSIS — M81.0 AGE-RELATED OSTEOPOROSIS WITHOUT CURRENT PATHOLOGICAL FRACTURE: ICD-10-CM

## 2024-07-16 PROCEDURE — 99213 OFFICE O/P EST LOW 20 MIN: CPT | Performed by: PHYSICIAN ASSISTANT

## 2024-07-16 RX ORDER — DENOSUMAB 60 MG/ML
60 INJECTION SUBCUTANEOUS
Start: 2024-07-16

## 2024-07-16 NOTE — PROGRESS NOTES
"Chief Complaint   Patient presents with    Osteoporosis       Subjective     Kimberly Brito is a 63 y.o. female.        History of Present Illness     Pt had her Prolia shot on April 2024, was about a month late due to getting it through specialty pharmacy. Will be due again in October 2024.     Last DEXA scan 7/2022 with [facility], showed osteopenia with slight improvement. Did have DEXA at [facility] in 2018.     No new fractures or other concerns regarding bones. Calcium supplement once a day and MVI with calcium. Take vitamin D 2,000 units every other day. Exercises regularly, 3 times a week, elliptical and weight bearing; reduced exercise d/t hip flexor tendonitis.        Current Outpatient Medications:     denosumab (Prolia) 60 MG/ML solution prefilled syringe syringe, Inject 1 mL under the skin into the appropriate area as directed Every 6 (Six) Months., Disp: , Rfl:     calcium acetate (PHOSLO) 667 MG capsule, Take 2 capsules by mouth 3 (Three) Times a Day With Meals., Disp: , Rfl:     cholecalciferol (VITAMIN D3) 1000 units tablet, Take 2 tablets by mouth Daily., Disp: , Rfl:     minocycline (MINOCIN,DYNACIN) 100 MG capsule, Take 1 capsule by mouth Daily., Disp: , Rfl:     Multiple Vitamins-Minerals (MULTIVITAMIN ADULT PO), Take 1 tablet by mouth Daily., Disp: , Rfl:      PMFSH  The following portions of the patient's history were reviewed and updated as appropriate: allergies, current medications, past family history, past medical history, past social history, past surgical history, and problem list.    Review of Systems   Constitutional:  Negative for activity change and fatigue.   Musculoskeletal:  Positive for arthralgias and myalgias.       Objective   /70   Pulse 72   Ht 157.5 cm (62\")   Wt 50.3 kg (111 lb)   LMP  (LMP Unknown)   SpO2 97%   BMI 20.30 kg/m²     Physical Exam  Vitals and nursing note reviewed.   Constitutional:       Appearance: She is well-developed.   HENT:      Head: " Normocephalic and atraumatic.   Eyes:      Conjunctiva/sclera: Conjunctivae normal.   Cardiovascular:      Rate and Rhythm: Normal rate.   Pulmonary:      Effort: Pulmonary effort is normal.   Musculoskeletal:      Cervical back: Normal range of motion.   Psychiatric:         Behavior: Behavior normal.              ASSESSMENT/PLAN    Diagnoses and all orders for this visit:    1. Age-related osteoporosis without current pathological fracture  Assessment & Plan:  DEXA scan done at [facility] 7/2022. Due for 2 year follow up and will reestablish baseline at [facility]. DEXA ordered and will be scheduled in next few months. Discussed importance of getting scan in the same location each time. Further recommendations based on results.    Likely will plan to continue Prolia- has been well tolerated and effective, with evidence of bone density improvement while on medication and evidence of worsening when off the medication. Due for next Prolia injection on 10/2024. Patient [facility] to make sure Prolia will be shipped to our office in time for administration in October.    Continue current dose of vitamin D, normal level of 51 on 12/22/23. Continue multivitamin and Calcium supplement for total of 9549-5226 mg of calcium daily. Continue weight bearing exercise.    Orders:  -     denosumab (Prolia) 60 MG/ML solution prefilled syringe syringe; Inject 1 mL under the skin into the appropriate area as directed Every 6 (Six) Months.  -     dexa bone density axial; Future             Return in about 6 months (around 1/16/2025) for Follow up.    Portions of this note were completed with voice recognition program.    Electronically signed by JENSEN Flower

## 2024-07-16 NOTE — ASSESSMENT & PLAN NOTE
DEXA scan done at [facility] 7/2022. Due for 2 year follow up and will reestablish baseline at [facility]. DEXA ordered and will be scheduled in next few months. Discussed importance of getting scan in the same location each time. Further recommendations based on results.    Likely will plan to continue Prolia- has been well tolerated and effective, with evidence of bone density improvement while on medication and evidence of worsening when off the medication. Due for next Prolia injection on 10/2024. Patient will contact [employer] to make sure Prolia will be shipped to our office in time for administration in October.    Continue current dose of vitamin D, normal level of 51 on 12/22/23. Continue multivitamin and Calcium supplement for total of 3991-0007 mg of calcium daily. Continue weight bearing exercise.

## 2024-07-17 DIAGNOSIS — M25.551 RIGHT HIP PAIN: Primary | ICD-10-CM

## 2024-07-29 ENCOUNTER — TREATMENT (OUTPATIENT)
Dept: PHYSICAL THERAPY | Facility: CLINIC | Age: 64
End: 2024-07-29
Payer: COMMERCIAL

## 2024-07-29 DIAGNOSIS — M25.551 RIGHT HIP PAIN: Primary | ICD-10-CM

## 2024-07-29 PROCEDURE — 97140 MANUAL THERAPY 1/> REGIONS: CPT | Performed by: PHYSICAL THERAPIST

## 2024-07-29 PROCEDURE — 97110 THERAPEUTIC EXERCISES: CPT | Performed by: PHYSICAL THERAPIST

## 2024-07-29 PROCEDURE — 97161 PT EVAL LOW COMPLEX 20 MIN: CPT | Performed by: PHYSICAL THERAPIST

## 2024-07-29 NOTE — PROGRESS NOTES
Subjective   Chief Complaint   Patient presents with    Annual Exam     Well woman exam     Kimberly Brito is a 63 y.o. year old  menopausal female presenting to be seen for her annual exam.  This past year she has not been on hormone replacement therapy.  There has not been vaginal bleeding in the last 12 months.  Menopausal symptoms are not present.  She has mammogram scheduled for next month.  She has a DEXA scan scheduled for September.  Her last Pap was 2020 with normal cytology.  Had breast MRI January of this year.  SEXUAL Hx:  She is currently sexually active.  In the past year there there has been NO new sexual partners.    Condoms are never used.  She would not like to be screened for STD's at today's exam.  [place] is painful: no  HEALTH Hx:  She exercises regularly: yes.  She wears her seat belt: yes.  She has concerns about domestic violence: no.  She has noticed changes in height: no.  OTHER THINGS SHE WANTS TO DISCUSS TODAY:  Nothing else    The following portions of the patient's history were reviewed and updated as appropriate:problem list, current medications, allergies, past family history, past medical history, past social history, and past surgical history.    Social History    Tobacco Use      Smoking status: Never        Passive exposure: Never      Smokeless tobacco: Never      Tobacco comments: Parents smoked in the home      Review of Systems  Constitutional POS: nothing reported    NEG: anorexia or night sweats   Genitourinary POS: nothing reported    NEG: dysuria or hematuria      Gastointestinal POS: nothing reported    NEG: bloating, change in bowel habits, melena, or reflux symptoms   Integument POS: nothing reported    NEG: moles that are changing in size, shape, color or rashes   Breast POS: nothing reported    NEG: persistent breast lump, skin dimpling, or nipple discharge        Objective   /80 (BP Location: Left arm, Patient Position: Sitting, Cuff Size:  "Adult)   Ht 157.5 cm (62\")   Wt 50.5 kg (111 lb 6.4 oz)   LMP  (LMP Unknown)   BMI 20.38 kg/m²     General:  well developed; well nourished  no acute distress   Skin:  No suspicious lesions seen   Thyroid: normal to inspection and palpation   Breasts:  Examined in supine position  Symmetric without masses or skin dimpling  Nipples normal without inversion, lesions or discharge  There are no palpable axillary nodes   Abdomen: soft, non-tender; no masses  no umbilical or inguinal hernias are present  no hepato-splenomegaly   Pelvis: Clinical staff was present for exam  External genitalia:  normal appearance of the external genitalia including Bartholin's and [place]'s glands.  :  urethral meatus normal;  Vaginal:  atrophic mucosal changes are present;  Cervix:  normal appearance.  Uterus:  normal size, shape and consistency.  Adnexa:  normal bimanual exam of the adnexa.  Rectal:  digital rectal exam not performed; anus visually normal appearing.        Assessment   Well woman with routine gynecological exam  FHx of breast CA Lifetime risk of breast cancer > 20% - genetic testing declined  FHx of ovarian cancer - genetic testing declined.  Patient already participates in [facility] ovarian cancer screening study  Osteoporosis followed by endocrinology and on prolia   Menopausal female currently not on HRT - without significant symptoms affecting activities of daily living  She is up to date on all relevant gynecologic and colorectal screenings         Plan   Pap was done today.  If she does not receive the results of the Pap within 2 weeks  time, she was instructed to call to find out the results.  I explained to Kimberly that the recommendations for Pap smear interval in a low risk patient's has lengthened to 3 years time.  I encouraged her to be seen yearly for a full physical exam including breast and pelvic exam even during the off years when PAP's will not be performed.  She was encouraged to get yearly mammograms along " with yearly breast MRI.  The studies should set up to stagger every 6 months.  She should report any palpable breast lump(s) or skin changes regardless of mammographic findings.  I explained to Kimberly that notification regarding her mammogram results will come from the center performing the study.  Our office will not be routinely calling with mammogram results.  It is her responsibility to make sure that the results from the mammogram are communicated to her by the breast center.  If she has any questions about the results, she is welcome to call our office anytime.  The importance of keeping all planned follow-up and taking all medications as prescribed was emphasized.  At the time of her annual with Dr. Vargas last year she declined mastectomy or chemoprophylaxis and declines prophylactic salpingo-oophorectomy.  She is currently participating in [facility] ovarian cancer screening with yearly transvaginal ultrasounds.  Today I discussed with Kimberly the total recommended calcium intake for a post-menopausal female is 1200 mg.  Ideally this should be from dietary sources.  I reviewed calcium content in various foods including milk, fortified orange juice and yogurt.  If she cannot get sufficient calcium through dietary means, it is recommended to supplement with either a multivitamin or calcium to reach her daily goal.  I also reviewed the difference in the bioavailability of calcium carbonate and calcium citrate containing supplements and the importance of taking calcium carbonate containing products with food. Finally, vitamin D's role in calcium absorption was reviewed and a total daily vitamin D intake of 600 units was recommended.  Next DEXA scan scheduled for September of this year.  Her vaccine record was reviewed and updated.  Follow up for annual exam 1 year and in 6 months for clinical breast exam given her elevated breast cancer risk.    No orders of the defined types were placed in this encounter.         This  note was electronically signed.  Lyla Moreland, APRN  July 30, 2024

## 2024-07-29 NOTE — PROGRESS NOTES
Physical Therapy Initial Evaluation and Plan of Care    [facility]  [address]  [place] [id number] [phone]    Patient: Kimberly Brito   : 1960  Diagnosis/ICD-10 Code:  Right hip pain [M25.551]  Referring practitioner: Jules Wright MD    Subjective Evaluation    History of Present Illness  Date of onset: 2024  Mechanism of injury: Chronic    Subjective comment: Has had chronic right hip pain for the past 4 to 5 months.  Symptoms started insidiously.  Does not recall any change in exercise routine or daily activities.  Does hold her 10-month old grandbaby on the right hip.  Not sure if that has any do with it.  No numbness or tingling of the right lower extremity.  Her son is a physical therapist and provided some hip mobility exercises that she started yesterday and does feel better crossing her leg today.  No previous history of right hip pain.  No sleep issues.Quality of life: excellent    Pain  Alleviating factors: Elliptical (non-painful); Advil; machine work at gym (non-painful)  Exacerbated by: Cardio group exercise (not performed in 2 months); stepping over baby gate; standing up from sitting (popping in hip); bike (hip flx); SLR; lunging.  Progression: improved (Since starting hip mobility exercises yesterday.)    Social Support  Lives with: spouse    Treatments  Previous treatment: physical therapy (2 visits at [facility] [place]->insurance was not approved the practice)  Patient Goals  Patient goals for therapy: decreased pain, increased motion, increased strength and return to sport/leisure activities  Patient goal: Get back to cardio dance class at [facility].         *LEFS:  54/80    Objective          Passive Range of Motion   Left Hip   Flexion: 128 degrees   Internal rotation (90/90): 42 degrees     Right Hip   Flexion: 105 degrees with pain  Internal rotation (90/90): 38 degrees with pain    Additional Passive Range of Motion  Details  *R hip ext->10 deg->pain    Strength/Myotome Testing     Left Hip   Planes of Motion   Flexion: 5    Right Hip   Planes of Motion   Flexion: 4+ (pain)  Abduction: 4+ (pain)          Assessment & Plan       Assessment  Impairments: abnormal or restricted ROM, activity intolerance, lacks appropriate home exercise program and pain with function   Functional limitations: unable to perform repetitive tasks (Repetitive weight-bearing exercise (I.e. group cardio/dance class)  Lunging  )  Assessment details: Mrs. Brito is a pleasant 63-year-old female that presents to physical therapy with chronic right hip pain.  Symptoms started insidiously about 5 months ago.  Does not ambulate with antalgia.  Pain is reproduction of right hip pain with hip flexion and abduction loading.  Has pain at endrange internal rotation compared bilaterally.  Has pain reproduction with palpation at the anterolateral hip.  Needs focus care on restoring hip rotation mobility and aims to desensitize the right hip with all functional loading patterns required during ADLs and gym routine.  Prognosis: good    Goals  Plan Goals: STGs:  1.)  LEFS improved x 1 MCID in 6 weeks.  2.)  Have 0/10 pain with right hip flexion and abduction loading against manual resistance in 6 weeks.  LTGs:  1.)  Have no right hip pain with participation in cardio dance class in 8 weeks.    Plan  Therapy options: will be seen for skilled therapy services  Planned therapy interventions: therapeutic activities, stretching, strengthening, manual therapy, abdominal trunk stabilization, home exercise program, functional ROM exercises and balance/weight-bearing training  Frequency: 2x month  Duration in visits: 4  Duration in weeks: 8  Treatment plan discussed with: patient        Manual Therapy:    9     mins  94329;  Therapeutic Exercise:    15     mins  01144;     Neuromuscular Anisa:       mins  50537;    Therapeutic Activity:          mins  65152;     Gait Training:            mins  60432;     Ultrasound:          mins  01449;    Electrical Stimulation:         mins  87654 ( );  Dry Needling          mins self-pay    Timed Treatment:   24   mins   Total Treatment:     60   mins    PT SIGNATURE: Familia Braga, PT   DATE TREATMENT INITIATED: 7/29/2024    Initial Certification  Certification Period: 10/27/2024  I certify that the therapy services are furnished while this patient is under my care.  The services outlined above are required by this patient, and will be reviewed every 90 days.     PHYSICIAN: Jules Wright MD  NPI: [id number]                                      DATE:    Please sign and return via fax to [phone].. Thank you, [facility].

## 2024-07-30 ENCOUNTER — OFFICE VISIT (OUTPATIENT)
Dept: OBSTETRICS AND GYNECOLOGY | Facility: CLINIC | Age: 64
End: 2024-07-30
Payer: COMMERCIAL

## 2024-07-30 VITALS
DIASTOLIC BLOOD PRESSURE: 80 MMHG | BODY MASS INDEX: 20.5 KG/M2 | HEIGHT: 62 IN | SYSTOLIC BLOOD PRESSURE: 130 MMHG | WEIGHT: 111.4 LBS

## 2024-07-30 DIAGNOSIS — Z80.3 FAMILY HISTORY OF BREAST CANCER IN MOTHER: ICD-10-CM

## 2024-07-30 DIAGNOSIS — Z01.419 WELL WOMAN EXAM WITH ROUTINE GYNECOLOGICAL EXAM: Primary | ICD-10-CM

## 2024-07-30 DIAGNOSIS — Z80.41 FAMILY HISTORY OF OVARIAN CANCER: ICD-10-CM

## 2024-07-30 PROCEDURE — 99459 PELVIC EXAMINATION: CPT | Performed by: NURSE PRACTITIONER

## 2024-07-30 PROCEDURE — 99396 PREV VISIT EST AGE 40-64: CPT | Performed by: NURSE PRACTITIONER

## 2024-07-30 RX ORDER — MINOCYCLINE HYDROCHLORIDE 50 MG/1
50 CAPSULE ORAL 2 TIMES DAILY
COMMUNITY
Start: 2024-07-23

## 2024-08-06 ENCOUNTER — HOSPITAL ENCOUNTER (OUTPATIENT)
Dept: MAMMOGRAPHY | Facility: HOSPITAL | Age: 64
Discharge: HOME | End: 2024-08-06
Admitting: OBSTETRICS & GYNECOLOGY
Payer: COMMERCIAL

## 2024-08-06 DIAGNOSIS — Z12.31 BREAST CANCER SCREENING BY MAMMOGRAM: ICD-10-CM

## 2024-08-06 LAB — REF LAB TEST METHOD: NORMAL

## 2024-08-06 PROCEDURE — 77063 BREAST TOMOSYNTHESIS BI: CPT

## 2024-08-06 PROCEDURE — 77067 SCR MAMMO BI INCL CAD: CPT

## 2024-08-07 PROCEDURE — 77063 BREAST TOMOSYNTHESIS BI: CPT | Performed by: RADIOLOGY

## 2024-08-07 PROCEDURE — 77067 SCR MAMMO BI INCL CAD: CPT | Performed by: RADIOLOGY

## 2024-08-09 ENCOUNTER — TREATMENT (OUTPATIENT)
Dept: PHYSICAL THERAPY | Facility: CLINIC | Age: 64
End: 2024-08-09
Payer: COMMERCIAL

## 2024-08-09 DIAGNOSIS — M25.551 RIGHT HIP PAIN: Primary | ICD-10-CM

## 2024-08-09 NOTE — PROGRESS NOTES
Physical Therapy Daily Progress Note    Patient: Kimberly Brito   : 1960  Diagnosis/ICD-10 Code:  Right hip pain [M25.551]  Referring practitioner: Jules Wright MD  Date of Initial Visit: Type: THERAPY  Noted: 2024  Today's Date: 2024  Patient seen for 2 sessions         Kimberly Brito reports feeling a little bit better in terms of walking pain.  Has pain with hip IR exercise on stool.  Other home exercises are not aggravating.        Subjective     Objective   See Exercise, Manual, and Modality Logs for complete treatment.       Assessment/Plan  Focused visit on improving right hip stiffness via manual therapy.  Combined with exercises to improve general hip strength and loading tolerance.  Will f/u in 2 weeks.  If symptoms are not improved at that time, will recommend radiographic imaging of the right hip to her PCP.         Manual Therapy:    8     mins  99516;  Therapeutic Exercise:    33     mins  67096;     Neuromuscular Anisa:        mins  03145;    Therapeutic Activity:          mins  17565;     Gait Trainin     mins  83199;     Ultrasound:          mins  53538;    Electrical Stimulation:        mins  31140 ( );  Dry Needling          mins self-pay    Timed Treatment:   53   mins   Total Treatment:     53  mins    Familia Braga, CARLOS ALBERTO  Physical Therapist

## 2024-09-06 DIAGNOSIS — M25.551 RIGHT HIP PAIN: Primary | ICD-10-CM

## 2024-09-13 ENCOUNTER — OFFICE VISIT (OUTPATIENT)
Dept: ORTHOPEDIC SURGERY | Facility: CLINIC | Age: 64
End: 2024-09-13
Payer: COMMERCIAL

## 2024-09-13 VITALS
SYSTOLIC BLOOD PRESSURE: 126 MMHG | HEIGHT: 62 IN | DIASTOLIC BLOOD PRESSURE: 70 MMHG | WEIGHT: 111.8 LBS | BODY MASS INDEX: 20.57 KG/M2

## 2024-09-13 DIAGNOSIS — M16.11 PRIMARY OSTEOARTHRITIS OF RIGHT HIP: Primary | ICD-10-CM

## 2024-09-13 DIAGNOSIS — M25.551 RIGHT HIP PAIN: ICD-10-CM

## 2024-09-13 PROCEDURE — 99244 OFF/OP CNSLTJ NEW/EST MOD 40: CPT | Performed by: ORTHOPAEDIC SURGERY

## 2024-09-13 RX ORDER — MELOXICAM 15 MG/1
TABLET ORAL
Qty: 90 TABLET | Refills: 1 | Status: SHIPPED | OUTPATIENT
Start: 2024-09-13 | End: 2024-09-16 | Stop reason: SDUPTHER

## 2024-09-13 NOTE — LETTER
September 13, 2024       No Recipients    Patient: Kimberly Brito   YOB: 1960   Date of Visit: 9/13/2024       Dear Jules Wright MD:    Thank you for referring Kimberly Brito to me for evaluation. Below are the relevant portions of my assessment and plan of care.    Encounter Diagnosis and Orders:  Diagnoses and all orders for this visit:    1. Primary osteoarthritis of right hip (Primary)  -     meloxicam (MOBIC) 15 MG tablet; 1 PO Daily with food.  Dispense: 90 tablet; Refill: 1    2. Right hip pain  -     XR Hip With or Without Pelvis 2 - 3 View Right        If you have questions, please do not hesitate to call me. I look forward to following Kimberly along with you.         Sincerely,        Rodrick Verduzco MD        CC:   No Recipients

## 2024-09-13 NOTE — PROGRESS NOTES
Orthopaedic Clinic Note: Hip New Patient    Chief Complaint   Patient presents with    Right Hip - Pain        HPI  Consult from: Jules Wright MD    Kimberly Brito is a 63 y.o. female who presents with right hip pain for 7 month(s). Onset atraumatic and gradual in nature. Pain is localized to groin and is a 6/10 on the pain scale.Pain is described as dull and aching. Associated symptoms include pain, popping, and stiffness.  The pain is worse with climbing stairs, leisure, rising from seated position, and squatting ; pain medication and/or NSAID improve the pain. Previous treatments have included: NSAIDS and physical therapy for 3 months duration or longer. Although some transient relief was reported with these interventions, these conservative measures have failed and symptoms have persisted. The patient is limited in daily activities and has had a significant decrease in quality of life as a result. She denies fevers, chills, or constitutional symptoms.    I have reviewed the following portions of the patient's history:History of Present Illness    Past Medical History:   Diagnosis Date    Annual GYN exam in  12/19/2021    Hyperlipidemia     Melanoma of hip 1998    Osteopenia     Osteoporosis 2014    Rotator cuff tendinitis of right shoulder     Stress fracture     Tennis elbow       Past Surgical History:   Procedure Laterality Date    BREAST BIOPSY Left     2008    COLONOSCOPY      MAMMO STEREOTACTIC BREAST BIOPSY 1ST W WO DEVICE Left 10/14/2008    US bx    SKIN CANCER EXCISION Left 1998    Melanoma of left hip      Family History   Problem Relation Age of Onset    Breast cancer Mother 75    Colon cancer Mother 65    Cancer Mother     Osteoporosis Mother     Ovarian cancer Maternal Aunt         age unknown    Diabetes Father     Lung cancer Father     Arthritis Father     Cancer Father     Heart disease Brother     Diabetes Brother     Stroke Maternal Grandmother     Heart disease Maternal Grandfather      Kidney disease Maternal Aunt     Kidney disease Maternal Uncle      Social History     Socioeconomic History    Marital status:     Number of children: 2    Highest education level: Bachelor's degree (e.g., BA, AB, BS)   Tobacco Use    Smoking status: Never     Passive exposure: Never    Smokeless tobacco: Never    Tobacco comments:     Parents smoked in the home   Vaping Use    Vaping status: Never Used   Substance and Sexual Activity    Alcohol use: Yes     Alcohol/week: 14.0 standard drinks of alcohol     Types: 7 Glasses of wine, 7 Shots of liquor per week    Drug use: No    Sexual activity: Yes     Partners: Male     Birth control/protection: Post-menopausal, Surgical      Current Outpatient Medications on File Prior to Visit   Medication Sig Dispense Refill    calcium acetate (PHOSLO) 667 MG capsule Take 2 capsules by mouth 3 (Three) Times a Day With Meals.      cholecalciferol (VITAMIN D3) 1000 units tablet Take 2 tablets by mouth Daily.      denosumab (Prolia) 60 MG/ML solution prefilled syringe syringe Inject 1 mL under the skin into the appropriate area as directed Every 6 (Six) Months.      minocycline (MINOCIN,DYNACIN) 50 MG capsule Take 1 capsule by mouth 2 (Two) Times a Day.      Multiple Vitamins-Minerals (MULTIVITAMIN ADULT PO) Take 1 tablet by mouth Daily.       No current facility-administered medications on file prior to visit.      No Known Allergies     Review of Systems   Constitutional: Negative.    HENT: Negative.     Eyes: Negative.    Respiratory: Negative.     Cardiovascular: Negative.    Gastrointestinal: Negative.    Endocrine: Negative.    Genitourinary: Negative.    Musculoskeletal:  Positive for arthralgias.   Skin: Negative.    Allergic/Immunologic: Negative.    Neurological: Negative.    Hematological: Negative.    Psychiatric/Behavioral: Negative.          The patient's Review of Systems was personally reviewed and confirmed as accurate.    The following portions of the  "patient's history were reviewed and updated as appropriate: allergies, current medications, past family history, past medical history, past social history, past surgical history, and problem list.    Physical Exam  Blood pressure 126/70, height 158.5 cm (62.4\"), weight 50.7 kg (111 lb 12.8 oz), not currently breastfeeding.    Body mass index is 20.19 kg/m².    GENERAL APPEARANCE: awake, alert & oriented x 3, in no acute distress and well developed, well nourished  PSYCH: normal affect  LUNGS:  breathing nonlabored  EYES: sclera anicteric  CARDIOVASCULAR: palpable dorsalis pedis, palpable posterior tibial bilaterally. Capillary refill less than 2 seconds  EXTREMITIES: no clubbing, cyanosis  GAIT:  Antalgic           Right Hip Exam:  RANGE OF MOTION:   FLEXION CONTRACTURE: None   FLEXION: 110 degrees   INTERNAL ROTATION: 20 degrees at 90 degrees of flexion   EXTERNAL ROTATION: 30 degrees at 90 degrees of flexion    PAIN WITH HIP MOTION: Yes localized to groin  PAIN WITH LOGROLL: no  STINCHFIELD TEST: negative    KNEE EXAM: full knee ROM (0-120 degrees), stable to varus and valgus stress at terminal extension and 30 degrees flexion     STRENGTH:  5/5 hip adduction, abduction, flexion. 5/5 strength knee flexion, extension. 5/5 strength ankle dorsiflexion and plantarflexion.     GREATER TROCHANTER BURSAL PAIN:  no     REFLEXES:   PATELLAR 2+/4   ACHILLES 2+/4    CLONUS: negative  STRAIGHT LEG TEST:   negative    SENSATION TO LIGHT TOUCH:  DEEP PERONEAL/SUPERFICIAL PERONEAL/SURAL/SAPHENOUS/TIBIAL:  intact    EDEMA:   no  ERYTHEMA:  no  WOUNDS/INCISIONS: no overlying skin problems.      Left Hip Exam:   RANGE OF MOTION:   FLEXION CONTRACTURE: None   FLEXION: 110 degrees   INTERNAL ROTATION: 20 degrees at 90 degrees of flexion   EXTERNAL ROTATION: 40 degrees at 90 degrees of flexion    PAIN WITH HIP MOTION: no  PAIN WITH LOGROLL: no  STINCHFIELD TEST: negative    KNEE EXAM: full knee ROM (0-120 degrees), stable to varus and " valgus stress at terminal extension and 30 degrees flexion     STRENGTH:  5/5 hip adduction, abduction, flexion. 5/5 strength knee flexion, extension. 5/5 strength ankle dorsiflexion and plantarflexion.     GREATER TROCHANTER BURSAL PAIN:  no     REFLEXES:   PATELLAR 2+/4   ACHILLES 2+/4    CLONUS: negative  STRAIGHT LEG TEST:   negative    SENSATION TO LIGHT TOUCH:  DEEP PERONEAL/SUPERFICIAL PERONEAL/SURAL/SAPHENOUS/TIBIAL:  intact    EDEMA:   no  ERYTHEMA:  no  WOUNDS/INCISIONS: no overlying skin problems.      ------------------------------------------------------------------    LEG LENGTHS:  equal  _____________________________________________________  _____________________________________________________    RADIOGRAPHIC FINDINGS:   Indication: Right hip pain    Comparison: No prior xrays are available for comparison    AP pelvis, hip 2 views: Right: moderate joint space narrowing,  there are marginal osteophytes visualized at the femoral head-neck junction and acetabular margins; Left: mild joint space narrowing, minimal osteophyte formation    Assessment/Plan:   Diagnosis Plan   1. Primary osteoarthritis of right hip  meloxicam (MOBIC) 15 MG tablet      2. Right hip pain  XR Hip With or Without Pelvis 2 - 3 View Right        Patient suffering from osteoarthritis of the right hip.  I discussed treatment options with her including total hip arthroplasty, intra-articular cortisone injection, prescription anti-inflammatory.  She is agreeable to prescription anti-inflammatory.  Meloxicam prescribed.  She will follow-up as needed.    Rodrick Verduzco MD  09/13/24  11:50 EDT

## 2024-09-14 ENCOUNTER — PATIENT MESSAGE (OUTPATIENT)
Dept: ORTHOPEDIC SURGERY | Facility: CLINIC | Age: 64
End: 2024-09-14
Payer: COMMERCIAL

## 2024-09-16 ENCOUNTER — TELEPHONE (OUTPATIENT)
Age: 64
End: 2024-09-16
Payer: COMMERCIAL

## 2024-09-16 ENCOUNTER — TELEPHONE (OUTPATIENT)
Dept: ENDOCRINOLOGY | Facility: CLINIC | Age: 64
End: 2024-09-16

## 2024-09-16 DIAGNOSIS — M16.11 PRIMARY OSTEOARTHRITIS OF RIGHT HIP: ICD-10-CM

## 2024-09-16 RX ORDER — MELOXICAM 15 MG/1
TABLET ORAL
Qty: 90 TABLET | Refills: 1 | Status: SHIPPED | OUTPATIENT
Start: 2024-09-16

## 2024-09-16 NOTE — TELEPHONE ENCOUNTER
"    Caller: Kimberly Brito \"Kimberly Morel\"    Relationship to patient: Self    Best call back number: [phone]    Patient is needing: [facility] NEEDS OFFICE TO CALL FOR PROLIA SHOT ORDERED FOR DELIVERY   "

## 2024-09-17 ENCOUNTER — TELEPHONE (OUTPATIENT)
Age: 64
End: 2024-09-17
Payer: COMMERCIAL

## 2024-09-19 ENCOUNTER — TELEPHONE (OUTPATIENT)
Dept: ENDOCRINOLOGY | Facility: CLINIC | Age: 64
End: 2024-09-19
Payer: COMMERCIAL

## 2024-10-04 ENCOUNTER — CLINICAL SUPPORT (OUTPATIENT)
Dept: ENDOCRINOLOGY | Facility: CLINIC | Age: 64
End: 2024-10-04
Payer: COMMERCIAL

## 2024-10-04 DIAGNOSIS — M81.0 AGE-RELATED OSTEOPOROSIS WITHOUT CURRENT PATHOLOGICAL FRACTURE: Primary | ICD-10-CM

## 2024-11-05 ENCOUNTER — HOSPITAL ENCOUNTER (OUTPATIENT)
Dept: BONE DENSITY | Facility: HOSPITAL | Age: 64
Discharge: HOME | End: 2024-11-05
Admitting: PHYSICIAN ASSISTANT
Payer: COMMERCIAL

## 2024-11-05 DIAGNOSIS — M81.0 AGE-RELATED OSTEOPOROSIS WITHOUT CURRENT PATHOLOGICAL FRACTURE: ICD-10-CM

## 2024-11-05 PROCEDURE — 77080 DXA BONE DENSITY AXIAL: CPT

## 2024-11-06 NOTE — PROGRESS NOTES
The bone density scan shows stability from your previous exam [facility]. Please continue Prolia every 6 months, along with weight bearing exercise, calcium and vitamin D. We will plan to do another DEXA scan in 2026.

## 2024-12-20 ENCOUNTER — OFFICE VISIT (OUTPATIENT)
Dept: ORTHOPEDIC SURGERY | Facility: CLINIC | Age: 64
End: 2024-12-20
Payer: COMMERCIAL

## 2024-12-20 VITALS
HEIGHT: 62 IN | SYSTOLIC BLOOD PRESSURE: 128 MMHG | DIASTOLIC BLOOD PRESSURE: 78 MMHG | WEIGHT: 117.2 LBS | BODY MASS INDEX: 21.57 KG/M2

## 2024-12-20 DIAGNOSIS — M16.11 PRIMARY OSTEOARTHRITIS OF RIGHT HIP: Primary | ICD-10-CM

## 2024-12-20 PROCEDURE — 99214 OFFICE O/P EST MOD 30 MIN: CPT | Performed by: ORTHOPAEDIC SURGERY

## 2024-12-20 NOTE — PROGRESS NOTES
Orthopaedic Clinic Note: Hip Established Patient    Chief Complaint   Patient presents with    Follow-up     3 months- right hip pain.        HPI    It has been 3  month(s) since Ms. Brito's last visit. She returns to clinic today for follow-up right hip pain.  Patient was last seen 3 months ago and prescribed meloxicam.  She returns to clinic today stating the meloxicam failed to provide adequate relief.  She is having 6/10 pain that she localizes to the groin.  Over the course the last 4 to 6 weeks, her pain has gotten progressively worse.  She is having increased stiffness and pain with rising from a seated position and squatting.  Overall she is doing worse.    Past Medical History:   Diagnosis Date    Annual GYN exam in  12/19/2021    Hip arthrosis 3/24    Hyperlipidemia     Melanoma of hip 1998    Osteopenia     Osteoporosis 2014    Rotator cuff tendinitis of right shoulder     Stress fracture     Tennis elbow       Past Surgical History:   Procedure Laterality Date    BREAST BIOPSY Left     2008    COLONOSCOPY      MAMMO STEREOTACTIC BREAST BIOPSY 1ST W WO DEVICE Left 10/14/2008    US bx    SKIN CANCER EXCISION Left 1998    Melanoma of left hip      Family History   Problem Relation Age of Onset    Breast cancer Mother 75    Colon cancer Mother 65    Cancer Mother     Osteoporosis Mother     Ovarian cancer Maternal Aunt         age unknown    Diabetes Father     Lung cancer Father     Arthritis Father     Cancer Father     Heart disease Brother     Diabetes Brother     Stroke Maternal Grandmother     Heart disease Maternal Grandfather     Kidney disease Maternal Aunt     Kidney disease Maternal Uncle     Cancer Brother         Lymphocitic leukemia     Social History     Socioeconomic History    Marital status:     Number of children: 2    Highest education level: Bachelor's degree (e.g., BA, AB, BS)   Tobacco Use    Smoking status: Never     Passive exposure: Never    Smokeless tobacco: Never     "Tobacco comments:     Parents smoked in the home   Vaping Use    Vaping status: Never Used   Substance and Sexual Activity    Alcohol use: Yes     Alcohol/week: 17.0 standard drinks of alcohol     Types: 10 Glasses of wine, 7 Shots of liquor per week    Drug use: No    Sexual activity: Yes     Partners: Male     Birth control/protection: Post-menopausal, Surgical      Current Outpatient Medications on File Prior to Visit   Medication Sig Dispense Refill    calcium acetate (PHOSLO) 667 MG capsule Take 2 capsules by mouth 3 (Three) Times a Day With Meals.      cholecalciferol (VITAMIN D3) 1000 units tablet Take 2 tablets by mouth Daily.      meloxicam (MOBIC) 15 MG tablet 1 PO Daily with food. 90 tablet 1    Multiple Vitamins-Minerals (MULTIVITAMIN ADULT PO) Take 1 tablet by mouth Daily.      [DISCONTINUED] minocycline (MINOCIN,DYNACIN) 50 MG capsule Take 1 capsule by mouth 2 (Two) Times a Day.       No current facility-administered medications on file prior to visit.      No Known Allergies     Review of Systems   Constitutional: Negative.    HENT: Negative.     Eyes: Negative.    Respiratory: Negative.     Cardiovascular: Negative.    Gastrointestinal: Negative.    Endocrine: Negative.    Genitourinary: Negative.    Musculoskeletal:  Positive for arthralgias.   Skin: Negative.    Allergic/Immunologic: Negative.    Neurological: Negative.    Hematological: Negative.    Psychiatric/Behavioral: Negative.          The patient's Review of Systems was personally reviewed and confirmed as accurate.    Physical Exam  Blood pressure 128/78, height 158.5 cm (62.4\"), weight 53.2 kg (117 lb 3.2 oz), not currently breastfeeding.    Body mass index is 21.16 kg/m².    GENERAL APPEARANCE: awake, alert, oriented, in no acute distress and well developed, well nourished  LUNGS:  breathing nonlabored  EXTREMITIES: no clubbing, cyanosis  PERIPHERAL PULSES: palpable dorsalis pedis and posterior tibial pulses bilaterally.    GAIT:  " Antalgic            Hip Exam:  Right    RANGE OF MOTION:  EXTENSION/FLEXION:  normal (0-110 degrees)  IR (at 90 degrees of flexion):  20  ER (at 90 degrees of flexion):  30  PAIN WITH HIP MOTION:  yes, localized to groin  PAIN WITH LOGROLL:  no     STINCHFIELD TEST: positive    STRENGTH:  ABDUCTOR:  5/5  ADDUCTOR:  5/5  HIP FLEXION:  5/5    GREATER TROCHANTER BURSAL PAIN:  no    SENSATION TO LIGHT TOUCH:  DEEP PERONEAL/SUPERFICIAL PERONEAL/SURAL/SAPHENOUS/TIBIAL:   intact    EDEMA:  no  ERYTHEMA:  no  WOUNDS/INCISIONS:   no  _________________________________________________________________  _________________________________________________________________    RADIOGRAPHIC FINDINGS:   No new imaging today.  Prior imaging from 9/13/2024 reveals moderate to severe right hip arthritis with significant joint space narrowing and osteophyte formation.    Assessment/Plan:   Diagnosis Plan   1. Primary osteoarthritis of right hip          The patient has failed conservative treatment measures and is a candidate for joint arthroplasty.  I discussed the joint arthroplasty surgical process as well as the recovery and rehabilitation time frame.  The patient asked several questions regarding the joint arthroplasty surgery, which were answered accordingly.  Ultimately, the patient declines surgical intervention at this time and wishes to continue with conservative treatment measures.  Alternative conservative treatment measures were discussed including bracing, therapy, topical/oral anti-inflammatories, activity modification, and weight loss.  The patient considered these treatment options and wishes to proceed with corticosteroid injection(s) today.  Therefore we will proceed with corticosteroid injection(s) today.  I will refer her to Dr. Mtz for ultrasound-guided intra-articular cortisone injection.  She will follow-up with me in 3 months.    Rodrick Verduzco MD  12/20/24  07:39 EST

## 2024-12-23 ENCOUNTER — TELEPHONE (OUTPATIENT)
Dept: ORTHOPEDIC SURGERY | Facility: CLINIC | Age: 64
End: 2024-12-23

## 2024-12-23 NOTE — TELEPHONE ENCOUNTER
"Provider: SERGE    Caller: Kimberly Brito \"Kimberly Morel\"    Relationship to Patient: Self    Pharmacy:     Phone Number: [phone]    Reason for Call: PT CALLED TO GET AN UPDATE ON WHERE WE ARE ON THE REF TO DR DAVID FOR THE GUIDED INJ IN HER HIP  "

## 2025-01-02 ENCOUNTER — OFFICE VISIT (OUTPATIENT)
Age: 65
End: 2025-01-02
Payer: COMMERCIAL

## 2025-01-02 DIAGNOSIS — E04.1 THYROID NODULE: Primary | ICD-10-CM

## 2025-01-02 DIAGNOSIS — M81.0 AGE-RELATED OSTEOPOROSIS WITHOUT CURRENT PATHOLOGICAL FRACTURE: ICD-10-CM

## 2025-01-02 DIAGNOSIS — M16.11 PRIMARY OSTEOARTHRITIS OF RIGHT HIP: Primary | ICD-10-CM

## 2025-01-02 DIAGNOSIS — E78.2 MIXED HYPERLIPIDEMIA: ICD-10-CM

## 2025-01-02 DIAGNOSIS — Z00.00 ANNUAL PHYSICAL EXAM: ICD-10-CM

## 2025-01-02 RX ORDER — LIDOCAINE HYDROCHLORIDE 10 MG/ML
2 INJECTION, SOLUTION EPIDURAL; INFILTRATION; INTRACAUDAL; PERINEURAL
Status: COMPLETED | OUTPATIENT
Start: 2025-01-02 | End: 2025-01-02

## 2025-01-02 RX ORDER — TRIAMCINOLONE ACETONIDE 40 MG/ML
80 INJECTION, SUSPENSION INTRA-ARTICULAR; INTRAMUSCULAR
Status: COMPLETED | OUTPATIENT
Start: 2025-01-02 | End: 2025-01-02

## 2025-01-02 RX ORDER — BUPIVACAINE HYDROCHLORIDE 5 MG/ML
2 INJECTION, SOLUTION EPIDURAL; INTRACAUDAL
Status: COMPLETED | OUTPATIENT
Start: 2025-01-02 | End: 2025-01-02

## 2025-01-02 RX ADMIN — TRIAMCINOLONE ACETONIDE 80 MG: 40 INJECTION, SUSPENSION INTRA-ARTICULAR; INTRAMUSCULAR at 09:07

## 2025-01-02 RX ADMIN — LIDOCAINE HYDROCHLORIDE 2 ML: 10 INJECTION, SOLUTION EPIDURAL; INFILTRATION; INTRACAUDAL; PERINEURAL at 09:07

## 2025-01-02 RX ADMIN — BUPIVACAINE HYDROCHLORIDE 2 ML: 5 INJECTION, SOLUTION EPIDURAL; INTRACAUDAL at 09:07

## 2025-01-02 NOTE — PROGRESS NOTES
Procedure   - Large Joint Arthrocentesis: R hip joint on 1/2/2025 9:07 AM  Indications: pain  Details: 21 G needle, ultrasound-guided anterior approach  Medications: 80 mg triamcinolone acetonide 40 MG/ML; 2 mL lidocaine PF 1% 1 %; 2 mL bupivacaine (PF) 0.5 %  Outcome: tolerated well, no immediate complications  Procedure, treatment alternatives, risks and benefits explained, specific risks discussed. Consent was given by the patient. Immediately prior to procedure a time out was called to verify the correct patient, procedure, equipment, support staff and site/side marked as required. Patient was prepped and draped in the usual sterile fashion.        64-year-old female presents with right hip pain from primary right hip osteoarthritis.  Patient is a referral from Dr. Verduzco for ultrasound-guided right hip joint corticosteroid injection.  Previous office documentation and images were personally reviewed prior to the visit.  We discussed them in detail how they correlate to experienced symptoms.  I explained the procedure in detail.  I answered all questions to the best my ability.  Risks and benefits as well as post procedure instructions were provided.  Patient elected to proceed and tolerated this procedure well.  See procedure note.  Follow-up with me will be on an as-needed basis.

## 2025-01-08 ENCOUNTER — LAB (OUTPATIENT)
Dept: LAB | Facility: HOSPITAL | Age: 65
End: 2025-01-08
Payer: COMMERCIAL

## 2025-01-08 DIAGNOSIS — M81.0 AGE-RELATED OSTEOPOROSIS WITHOUT CURRENT PATHOLOGICAL FRACTURE: ICD-10-CM

## 2025-01-08 DIAGNOSIS — E78.2 MIXED HYPERLIPIDEMIA: ICD-10-CM

## 2025-01-08 DIAGNOSIS — E04.1 THYROID NODULE: ICD-10-CM

## 2025-01-08 DIAGNOSIS — Z00.00 ANNUAL PHYSICAL EXAM: ICD-10-CM

## 2025-01-08 LAB
25(OH)D3 SERPL-MCNC: 67.9 NG/ML (ref 30–100)
ALBUMIN SERPL-MCNC: 4.1 G/DL (ref 3.5–5.2)
ALBUMIN/GLOB SERPL: 1.3 G/DL
ALP SERPL-CCNC: 46 U/L (ref 39–117)
ALT SERPL W P-5'-P-CCNC: 19 U/L (ref 1–33)
ANION GAP SERPL CALCULATED.3IONS-SCNC: 13 MMOL/L (ref 5–15)
AST SERPL-CCNC: 25 U/L (ref 1–32)
BASOPHILS # BLD AUTO: 0.02 10*3/MM3 (ref 0–0.2)
BASOPHILS NFR BLD AUTO: 0.4 % (ref 0–1.5)
BILIRUB SERPL-MCNC: 0.6 MG/DL (ref 0–1.2)
BUN SERPL-MCNC: 14 MG/DL (ref 8–23)
BUN/CREAT SERPL: 12.8 (ref 7–25)
CALCIUM SPEC-SCNC: 9.2 MG/DL (ref 8.6–10.5)
CHLORIDE SERPL-SCNC: 98 MMOL/L (ref 98–107)
CHOLEST SERPL-MCNC: 293 MG/DL (ref 0–200)
CO2 SERPL-SCNC: 27 MMOL/L (ref 22–29)
CREAT SERPL-MCNC: 1.09 MG/DL (ref 0.57–1)
DEPRECATED RDW RBC AUTO: 42 FL (ref 37–54)
EGFRCR SERPLBLD CKD-EPI 2021: 57.2 ML/MIN/1.73
EOSINOPHIL # BLD AUTO: 0.22 10*3/MM3 (ref 0–0.4)
EOSINOPHIL NFR BLD AUTO: 4 % (ref 0.3–6.2)
ERYTHROCYTE [DISTWIDTH] IN BLOOD BY AUTOMATED COUNT: 12.8 % (ref 12.3–15.4)
GLOBULIN UR ELPH-MCNC: 3.1 GM/DL
GLUCOSE SERPL-MCNC: 84 MG/DL (ref 65–99)
HBA1C MFR BLD: 5.2 % (ref 4.8–5.6)
HCT VFR BLD AUTO: 43.8 % (ref 34–46.6)
HDLC SERPL-MCNC: 101 MG/DL (ref 40–60)
HGB BLD-MCNC: 15.2 G/DL (ref 12–15.9)
IMM GRANULOCYTES # BLD AUTO: 0.01 10*3/MM3 (ref 0–0.05)
IMM GRANULOCYTES NFR BLD AUTO: 0.2 % (ref 0–0.5)
LDLC SERPL CALC-MCNC: 178 MG/DL (ref 0–100)
LDLC/HDLC SERPL: 1.72 {RATIO}
LYMPHOCYTES # BLD AUTO: 1.25 10*3/MM3 (ref 0.7–3.1)
LYMPHOCYTES NFR BLD AUTO: 22.5 % (ref 19.6–45.3)
MCH RBC QN AUTO: 31.1 PG (ref 26.6–33)
MCHC RBC AUTO-ENTMCNC: 34.7 G/DL (ref 31.5–35.7)
MCV RBC AUTO: 89.8 FL (ref 79–97)
MONOCYTES # BLD AUTO: 0.56 10*3/MM3 (ref 0.1–0.9)
MONOCYTES NFR BLD AUTO: 10.1 % (ref 5–12)
NEUTROPHILS NFR BLD AUTO: 3.5 10*3/MM3 (ref 1.7–7)
NEUTROPHILS NFR BLD AUTO: 62.8 % (ref 42.7–76)
NRBC BLD AUTO-RTO: 0 /100 WBC (ref 0–0.2)
PLATELET # BLD AUTO: 202 10*3/MM3 (ref 140–450)
PMV BLD AUTO: 10.5 FL (ref 6–12)
POTASSIUM SERPL-SCNC: 4 MMOL/L (ref 3.5–5.2)
PROT SERPL-MCNC: 7.2 G/DL (ref 6–8.5)
RBC # BLD AUTO: 4.88 10*6/MM3 (ref 3.77–5.28)
SODIUM SERPL-SCNC: 138 MMOL/L (ref 136–145)
TRIGL SERPL-MCNC: 89 MG/DL (ref 0–150)
TSH SERPL DL<=0.05 MIU/L-ACNC: 3.16 UIU/ML (ref 0.27–4.2)
VIT B12 BLD-MCNC: 497 PG/ML (ref 211–946)
VLDLC SERPL-MCNC: 14 MG/DL (ref 5–40)
WBC NRBC COR # BLD AUTO: 5.56 10*3/MM3 (ref 3.4–10.8)

## 2025-01-08 PROCEDURE — 80061 LIPID PANEL: CPT

## 2025-01-08 PROCEDURE — 85025 COMPLETE CBC W/AUTO DIFF WBC: CPT

## 2025-01-08 PROCEDURE — 84443 ASSAY THYROID STIM HORMONE: CPT

## 2025-01-08 PROCEDURE — 82306 VITAMIN D 25 HYDROXY: CPT

## 2025-01-08 PROCEDURE — 83036 HEMOGLOBIN GLYCOSYLATED A1C: CPT

## 2025-01-08 PROCEDURE — 80053 COMPREHEN METABOLIC PANEL: CPT

## 2025-01-08 PROCEDURE — 82607 VITAMIN B-12: CPT

## 2025-01-14 ENCOUNTER — OFFICE VISIT (OUTPATIENT)
Dept: FAMILY MEDICINE CLINIC | Facility: CLINIC | Age: 65
End: 2025-01-14
Payer: COMMERCIAL

## 2025-01-14 VITALS
TEMPERATURE: 98.4 F | BODY MASS INDEX: 20.46 KG/M2 | HEART RATE: 70 BPM | SYSTOLIC BLOOD PRESSURE: 120 MMHG | DIASTOLIC BLOOD PRESSURE: 76 MMHG | WEIGHT: 111.2 LBS | HEIGHT: 62 IN | OXYGEN SATURATION: 97 %

## 2025-01-14 DIAGNOSIS — Z00.00 ANNUAL PHYSICAL EXAM: Primary | ICD-10-CM

## 2025-01-14 PROCEDURE — 99396 PREV VISIT EST AGE 40-64: CPT | Performed by: FAMILY MEDICINE

## 2025-01-14 RX ORDER — DENOSUMAB 60 MG/ML
60 INJECTION SUBCUTANEOUS
COMMUNITY

## 2025-01-14 RX ORDER — PHENOL 1.4 %
600 AEROSOL, SPRAY (ML) MUCOUS MEMBRANE DAILY
COMMUNITY

## 2025-01-14 NOTE — PROGRESS NOTES
Kimberly Brito is a 64 y.o. female who presents today for a well woman exam.    Chief Complaint   Patient presents with    Annual Exam        HPI     Last pap smear <1 years ago, results were normal. History of abnormal pap smear in the past but normal since. No family history of cervical, or uterine cancer. Maternal aunt had ovarian cancer.     Sexually active with one male partner. No vaginal discharge, itching, dysuria, or pelvic pain. Not interested in screening for STIs.     Last mammogram <1 year ago, results were normal. No history of abnormal mammogram. Family history of breast cancer in mother.     She sees the dentist twice a year. She sees the optometrist once a year. She sees the dermatologist at least once a year.     Diet is regular and generally healthy.     Physical activity includes an hour of exercise 5 days a week until recently she has been having hip pain in the right hip and is following with ortho.     Sleep problems include difficulty staying asleep. Average hours per night 6. She has tried melatonin but did not find this helpful.    No mood problems.       1/14/2025     8:28 AM   PHQ-2/PHQ-9 Depression Screening   Little interest or pleasure in doing things Not at all   Feeling down, depressed, or hopeless Not at all   How difficult have these problems made it for you to do your work, take care of things at home, or get along with other people? Not difficult at all       Review of Systems   Constitutional:  Negative for appetite change, fatigue, fever and unexpected weight loss.   HENT:  Negative for congestion, ear pain, sore throat and trouble swallowing.    Eyes:  Negative for visual disturbance.   Respiratory:  Negative for cough, shortness of breath and wheezing.    Cardiovascular:  Negative for chest pain, palpitations and leg swelling.   Gastrointestinal:  Negative for abdominal pain, blood in stool, constipation, diarrhea, nausea, vomiting, GERD and indigestion.   Endocrine: Negative  "for polydipsia and polyuria.   Genitourinary:  Negative for difficulty urinating.   Musculoskeletal:  Positive for arthralgias (right hip, chronic). Negative for joint swelling.   Skin:  Positive for rash (psoriasis).   Allergic/Immunologic: Negative for environmental allergies.   Neurological:  Negative for seizures and syncope.   Hematological:  Does not bruise/bleed easily.   Psychiatric/Behavioral:  Positive for sleep disturbance. Negative for suicidal ideas.         Health Maintenance   Topic Date Due    INFLUENZA VACCINE  2025 (Originally 2024)    COVID-19 Vaccine (2024- season) 2025 (Originally 2024)    LIPID PANEL  2026    ANNUAL PHYSICAL  2026    COLORECTAL CANCER SCREENING  2026    MAMMOGRAM  2026    PAP SMEAR  2027    TDAP/TD VACCINES (4 - Td or Tdap) 2031    ZOSTER VACCINE  Completed    HEPATITIS C SCREENING  Addressed    Pneumococcal Vaccine 0-64  Aged Out       Obstetric History:  OB History          2    Para   2    Term   2            AB        Living   2         SAB        IAB        Ectopic        Molar        Multiple        Live Births   2          Obstetric Comments    - Kurt (P1   - Ashia    1 Grandchildren:   0 grandsons & 1 granddaughters  Her grandchildren call her \"Beverley\"              Menstrual History:     No LMP recorded (lmp unknown). Patient is postmenopausal.         Past Medical History:   Diagnosis Date    Annual GYN exam in  2021    Hip arthrosis 3/24    Hyperlipidemia     Melanoma of hip     Osteopenia     Osteoporosis     Rotator cuff tendinitis of right shoulder     Stress fracture     Tennis elbow         Past Surgical History:   Procedure Laterality Date    COLONOSCOPY      MAMMO STEREOTACTIC BREAST BIOPSY 1ST W WO DEVICE Left 10/14/2008    US bx    SKIN CANCER EXCISION Left 1998    Melanoma of left hip    WISDOM TOOTH EXTRACTION Bilateral         Family History   Problem " Relation Age of Onset    Breast cancer Mother 75    Colon cancer Mother 65    Cancer Mother     Osteoporosis Mother     Diabetes Father     Lung cancer Father     Arthritis Father     Cancer Father     No Known Problems Sister     No Known Problems Sister     Heart disease Brother     Diabetes Brother     Cancer Brother         Lymphocitic leukemia    No Known Problems Brother     Stroke Maternal Grandmother     Heart disease Maternal Grandfather     Ovarian cancer Maternal Aunt         age unknown    Kidney disease Maternal Aunt     Kidney disease Maternal Uncle     Endometriosis Daughter     No Known Problems Son         Social History     Socioeconomic History    Marital status:     Number of children: 2    Highest education level: Bachelor's degree (e.g., BA, AB, BS)   Tobacco Use    Smoking status: Never     Passive exposure: Never    Smokeless tobacco: Never    Tobacco comments:     Parents smoked in the home   Vaping Use    Vaping status: Never Used   Substance and Sexual Activity    Alcohol use: Yes     Alcohol/week: 17.0 standard drinks of alcohol     Types: 10 Glasses of wine, 7 Shots of liquor per week    Drug use: No    Sexual activity: Yes     Partners: Male     Birth control/protection: Post-menopausal, Surgical     Comment:         Current Outpatient Medications on File Prior to Visit   Medication Sig Dispense Refill    cholecalciferol (VITAMIN D3) 1000 units tablet Take 2 tablets by mouth Daily.      meloxicam (MOBIC) 15 MG tablet 1 PO Daily with food. 90 tablet 1    Multiple Vitamins-Minerals (MULTIVITAMIN ADULT PO) Take 1 tablet by mouth Daily.      calcium carbonate (OS-JAKE) 600 MG tablet Take 1 tablet by mouth Daily.      denosumab (Prolia) 60 MG/ML solution prefilled syringe syringe Inject 1 mL under the skin into the appropriate area as directed Every 6 (Six) Months.       No current facility-administered medications on file prior to visit.       No Known Allergies     Visit  "Vitals  /76 (BP Location: Left arm, Patient Position: Sitting, Cuff Size: Adult)   Pulse 70   Temp 98.4 °F (36.9 °C) (Infrared)   Ht 157.5 cm (62\")   Wt 50.4 kg (111 lb 3.2 oz)   LMP  (LMP Unknown)   SpO2 97%   BMI 20.34 kg/m²        Physical Exam  Constitutional:       General: She is not in acute distress.     Appearance: She is well-developed. She is not diaphoretic.   HENT:      Head: Atraumatic.   Cardiovascular:      Rate and Rhythm: Normal rate and regular rhythm.      Heart sounds: Normal heart sounds. No murmur heard.     No friction rub. No gallop.   Pulmonary:      Effort: Pulmonary effort is normal. No respiratory distress.      Breath sounds: Normal breath sounds. No stridor. No wheezing, rhonchi or rales.   Abdominal:      General: Bowel sounds are normal. There is no distension.      Palpations: Abdomen is soft. There is no mass.      Tenderness: There is no abdominal tenderness. There is no guarding or rebound.      Hernia: No hernia is present.   Musculoskeletal:      Cervical back: Normal range of motion and neck supple.   Skin:     General: Skin is warm and dry.   Neurological:      Mental Status: She is alert and oriented to person, place, and time.   Psychiatric:         Behavior: Behavior normal.          Results for orders placed or performed in visit on 01/08/25   Comprehensive Metabolic Panel    Collection Time: 01/08/25  8:36 AM    Specimen: Blood   Result Value Ref Range    Glucose 84 65 - 99 mg/dL    BUN 14 8 - 23 mg/dL    Creatinine 1.09 (H) 0.57 - 1.00 mg/dL    Sodium 138 136 - 145 mmol/L    Potassium 4.0 3.5 - 5.2 mmol/L    Chloride 98 98 - 107 mmol/L    CO2 27.0 22.0 - 29.0 mmol/L    Calcium 9.2 8.6 - 10.5 mg/dL    Total Protein 7.2 6.0 - 8.5 g/dL    Albumin 4.1 3.5 - 5.2 g/dL    ALT (SGPT) 19 1 - 33 U/L    AST (SGOT) 25 1 - 32 U/L    Alkaline Phosphatase 46 39 - 117 U/L    Total Bilirubin 0.6 0.0 - 1.2 mg/dL    Globulin 3.1 gm/dL    A/G Ratio 1.3 g/dL    BUN/Creatinine Ratio " 12.8 7.0 - 25.0    Anion Gap 13.0 5.0 - 15.0 mmol/L    eGFR 57.2 (L) >60.0 mL/min/1.73   TSH Rfx On Abnormal To Free T4    Collection Time: 01/08/25  8:36 AM    Specimen: Blood   Result Value Ref Range    TSH 3.160 0.270 - 4.200 uIU/mL   Lipid Panel    Collection Time: 01/08/25  8:36 AM    Specimen: Blood   Result Value Ref Range    Total Cholesterol 293 (H) 0 - 200 mg/dL    Triglycerides 89 0 - 150 mg/dL    HDL Cholesterol 101 (H) 40 - 60 mg/dL    LDL Cholesterol  178 (H) 0 - 100 mg/dL    VLDL Cholesterol 14 5 - 40 mg/dL    LDL/HDL Ratio 1.72    Hemoglobin A1c    Collection Time: 01/08/25  8:36 AM    Specimen: Blood   Result Value Ref Range    Hemoglobin A1C 5.20 4.80 - 5.60 %   Vitamin D,25-Hydroxy    Collection Time: 01/08/25  8:36 AM    Specimen: Blood   Result Value Ref Range    25 Hydroxy, Vitamin D 67.9 30.0 - 100.0 ng/ml   Vitamin B12    Collection Time: 01/08/25  8:36 AM    Specimen: Blood   Result Value Ref Range    Vitamin B-12 497 211 - 946 pg/mL   CBC Auto Differential    Collection Time: 01/08/25  8:36 AM    Specimen: Blood   Result Value Ref Range    WBC 5.56 3.40 - 10.80 10*3/mm3    RBC 4.88 3.77 - 5.28 10*6/mm3    Hemoglobin 15.2 12.0 - 15.9 g/dL    Hematocrit 43.8 34.0 - 46.6 %    MCV 89.8 79.0 - 97.0 fL    MCH 31.1 26.6 - 33.0 pg    MCHC 34.7 31.5 - 35.7 g/dL    RDW 12.8 12.3 - 15.4 %    RDW-SD 42.0 37.0 - 54.0 fl    MPV 10.5 6.0 - 12.0 fL    Platelets 202 140 - 450 10*3/mm3    Neutrophil % 62.8 42.7 - 76.0 %    Lymphocyte % 22.5 19.6 - 45.3 %    Monocyte % 10.1 5.0 - 12.0 %    Eosinophil % 4.0 0.3 - 6.2 %    Basophil % 0.4 0.0 - 1.5 %    Immature Grans % 0.2 0.0 - 0.5 %    Neutrophils, Absolute 3.50 1.70 - 7.00 10*3/mm3    Lymphocytes, Absolute 1.25 0.70 - 3.10 10*3/mm3    Monocytes, Absolute 0.56 0.10 - 0.90 10*3/mm3    Eosinophils, Absolute 0.22 0.00 - 0.40 10*3/mm3    Basophils, Absolute 0.02 0.00 - 0.20 10*3/mm3    Immature Grans, Absolute 0.01 0.00 - 0.05 10*3/mm3    nRBC 0.0 0.0 - 0.2 /100  WBC        Immunization History   Administered Date(s) Administered    COVID-19 (PFIZER) Purple Cap Monovalent 03/02/2021, 03/22/2021, 04/12/2021, 05/03/2021, 11/09/2021    Flu Vaccine Quad PF >36MO 10/14/2020    INFLUENZA SPLIT TRI 10/05/2020, 10/15/2021    Influenza Seasonal Injectable 10/28/2021    Influenza, Unspecified 10/28/2021, 12/20/2022    Shingrix 12/28/2018, 03/08/2019, 03/08/2019    Tdap 09/09/2011, 08/03/2021    flucelvax quad pfs =>4 YRS 10/14/2020       Problems Addressed this Visit          Health Encounters    Annual physical exam - Primary     The patient is here for health maintenance visit.  Currently, the patient consumes a healthy diet and has an adequate exercise regimen.  Screening lab work is ordered.  Immunizations were reviewed today.  Advice and education was given regarding nutrition, aerobic exercise, routine dental evaluations, routine eye exams, reproductive health, cardiovascular risk reduction, sunscreen use, self skin examination (annual dermatology evaluations) and seatbelt use (general overall safety).  Further recommendations will be given if needed after lab evaluation.  Annual wellness evaluation is recommended.            Diagnoses         Codes Comments    Annual physical exam    -  Primary ICD-10-CM: Z00.00  ICD-9-CM: V70.0                Return in about 1 year (around 1/14/2026) for Annual.    Jules Wright MD  1/15/2025

## 2025-01-17 ENCOUNTER — OFFICE VISIT (OUTPATIENT)
Dept: ENDOCRINOLOGY | Facility: CLINIC | Age: 65
End: 2025-01-17
Payer: COMMERCIAL

## 2025-01-17 VITALS
BODY MASS INDEX: 19.98 KG/M2 | WEIGHT: 108.6 LBS | OXYGEN SATURATION: 99 % | HEIGHT: 62 IN | SYSTOLIC BLOOD PRESSURE: 122 MMHG | HEART RATE: 81 BPM | DIASTOLIC BLOOD PRESSURE: 74 MMHG

## 2025-01-17 DIAGNOSIS — M81.0 AGE-RELATED OSTEOPOROSIS WITHOUT CURRENT PATHOLOGICAL FRACTURE: Primary | ICD-10-CM

## 2025-01-17 PROCEDURE — 99213 OFFICE O/P EST LOW 20 MIN: CPT | Performed by: PHYSICIAN ASSISTANT

## 2025-01-17 NOTE — PROGRESS NOTES
Chief Complaint   Patient presents with    Age-Related Osteoporosis without Current Pathological Fract       Subjective     Kimberly Brito is a 64 y.o. female.        History of Present Illness     Patient had Prolia shot in October 2024, no delays in getting it this time. Had DEXA on 11/5/24 that showed stable t-scores in osteopenia range, lowest t-score at -2.1 in right femoral neck.    Notes that she had a tooth extraction since last visit. Procedure was timed to be in between Prolia shots. No mention of changes in jaw bone. Patient is worried about necrosis of jaw, dentist and oral surgeon did not mention concerns.    Dealing with overuse injury in right hip, undergoing PT and rest.    Pt was diagnosed with Osteoporosis around 2014, per chart t-score -2.6 in femoral neck. She was initially on fosamax but thought she may have had a rash related to the medicine. Still has the rash and seeing Dermatology. Otherwise she did well with it, no GI side effects. Took it for 2 years before she switched to Prolia around 2016, has been on taking for 7-8 years. Was also on hormone therapy for a few years before she stopped due to abnormal mammogram.      Was seeing Dr. Tabares at the [facility] and getting Prolia q 6 months. Was unable to get Prolia at [facility] because it was no longer on their formulary. Cost at [facility] was unaffordable for patient. Pt's insurance also changed this year. Prolia is on her formulary.     Went without Prolia for a year at some point (maybe around 2018) and T-score dropped.      Has had DEXA scans at both [facility] and [facility].      Since osteoporosis diagnosis, had a hairline fracture in her foot, no injury. Likely overuse from a job where she was walking for a long time. Has fallen without injury.     Mother and maternal aunts with osteoporosis, no fractures.     Went through menopause at age 51.      Pt takes a multivitamin but does not take the extra calcium  "tablet that has been recommended. Vitamin D is normal. 12/2023. She does weight bearing exercise frequently      Current Outpatient Medications:     calcium carbonate (OS-JAKE) 600 MG tablet, Take 1 tablet by mouth Daily., Disp: , Rfl:     cholecalciferol (VITAMIN D3) 1000 units tablet, Take 2 tablets by mouth Daily., Disp: , Rfl:     denosumab (Prolia) 60 MG/ML solution prefilled syringe syringe, Inject 1 mL under the skin into the appropriate area as directed Every 6 (Six) Months., Disp: , Rfl:     meloxicam (MOBIC) 15 MG tablet, 1 PO Daily with food., Disp: 90 tablet, Rfl: 1    Multiple Vitamins-Minerals (MULTIVITAMIN ADULT PO), Take 1 tablet by mouth Daily., Disp: , Rfl:      PMFSH  The following portions of the patient's history were reviewed and updated as appropriate: allergies, current medications, past family history, past medical history, past social history, past surgical history, and problem list.    Review of Systems   Musculoskeletal:  Positive for arthralgias, gait problem and myalgias.       Objective   /74 (BP Location: Right arm, Patient Position: Sitting, Cuff Size: Adult)   Pulse 81   Ht 157.5 cm (62.01\")   Wt 49.3 kg (108 lb 9.6 oz)   LMP  (LMP Unknown)   SpO2 99%   BMI 19.86 kg/m²     Physical Exam  Vitals and nursing note reviewed.   Constitutional:       Appearance: She is well-developed.   HENT:      Head: Normocephalic and atraumatic.   Eyes:      Conjunctiva/sclera: Conjunctivae normal.   Cardiovascular:      Rate and Rhythm: Normal rate and regular rhythm.   Pulmonary:      Effort: Pulmonary effort is normal.      Breath sounds: Normal breath sounds.   Musculoskeletal:         General: Normal range of motion.      Cervical back: Normal range of motion.   Skin:     General: Skin is warm and dry.   Psychiatric:         Behavior: Behavior normal.         Results for orders placed or performed in visit on 01/08/25   Comprehensive Metabolic Panel    Collection Time: 01/08/25  8:36 " AM    Specimen: Blood   Result Value Ref Range    Glucose 84 65 - 99 mg/dL    BUN 14 8 - 23 mg/dL    Creatinine 1.09 (H) 0.57 - 1.00 mg/dL    Sodium 138 136 - 145 mmol/L    Potassium 4.0 3.5 - 5.2 mmol/L    Chloride 98 98 - 107 mmol/L    CO2 27.0 22.0 - 29.0 mmol/L    Calcium 9.2 8.6 - 10.5 mg/dL    Total Protein 7.2 6.0 - 8.5 g/dL    Albumin 4.1 3.5 - 5.2 g/dL    ALT (SGPT) 19 1 - 33 U/L    AST (SGOT) 25 1 - 32 U/L    Alkaline Phosphatase 46 39 - 117 U/L    Total Bilirubin 0.6 0.0 - 1.2 mg/dL    Globulin 3.1 gm/dL    A/G Ratio 1.3 g/dL    BUN/Creatinine Ratio 12.8 7.0 - 25.0    Anion Gap 13.0 5.0 - 15.0 mmol/L    eGFR 57.2 (L) >60.0 mL/min/1.73   TSH Rfx On Abnormal To Free T4    Collection Time: 01/08/25  8:36 AM    Specimen: Blood   Result Value Ref Range    TSH 3.160 0.270 - 4.200 uIU/mL   Lipid Panel    Collection Time: 01/08/25  8:36 AM    Specimen: Blood   Result Value Ref Range    Total Cholesterol 293 (H) 0 - 200 mg/dL    Triglycerides 89 0 - 150 mg/dL    HDL Cholesterol 101 (H) 40 - 60 mg/dL    LDL Cholesterol  178 (H) 0 - 100 mg/dL    VLDL Cholesterol 14 5 - 40 mg/dL    LDL/HDL Ratio 1.72    Hemoglobin A1c    Collection Time: 01/08/25  8:36 AM    Specimen: Blood   Result Value Ref Range    Hemoglobin A1C 5.20 4.80 - 5.60 %   Vitamin D,25-Hydroxy    Collection Time: 01/08/25  8:36 AM    Specimen: Blood   Result Value Ref Range    25 Hydroxy, Vitamin D 67.9 30.0 - 100.0 ng/ml   Vitamin B12    Collection Time: 01/08/25  8:36 AM    Specimen: Blood   Result Value Ref Range    Vitamin B-12 497 211 - 946 pg/mL   CBC Auto Differential    Collection Time: 01/08/25  8:36 AM    Specimen: Blood   Result Value Ref Range    WBC 5.56 3.40 - 10.80 10*3/mm3    RBC 4.88 3.77 - 5.28 10*6/mm3    Hemoglobin 15.2 12.0 - 15.9 g/dL    Hematocrit 43.8 34.0 - 46.6 %    MCV 89.8 79.0 - 97.0 fL    MCH 31.1 26.6 - 33.0 pg    MCHC 34.7 31.5 - 35.7 g/dL    RDW 12.8 12.3 - 15.4 %    RDW-SD 42.0 37.0 - 54.0 fl    MPV 10.5 6.0 - 12.0 fL     Platelets 202 140 - 450 10*3/mm3    Neutrophil % 62.8 42.7 - 76.0 %    Lymphocyte % 22.5 19.6 - 45.3 %    Monocyte % 10.1 5.0 - 12.0 %    Eosinophil % 4.0 0.3 - 6.2 %    Basophil % 0.4 0.0 - 1.5 %    Immature Grans % 0.2 0.0 - 0.5 %    Neutrophils, Absolute 3.50 1.70 - 7.00 10*3/mm3    Lymphocytes, Absolute 1.25 0.70 - 3.10 10*3/mm3    Monocytes, Absolute 0.56 0.10 - 0.90 10*3/mm3    Eosinophils, Absolute 0.22 0.00 - 0.40 10*3/mm3    Basophils, Absolute 0.02 0.00 - 0.20 10*3/mm3    Immature Grans, Absolute 0.01 0.00 - 0.05 10*3/mm3    nRBC 0.0 0.0 - 0.2 /100 WBC        ASSESSMENT/PLAN    Diagnoses and all orders for this visit:    1. Age-related osteoporosis without current pathological fracture (Primary)  Assessment & Plan:  DEXA scan done 11/2024 shows stability when compared [facility]. Plan to repeat in 2026.    Discussed risk of jaw necrosis vs fracture related to osteoporosis and elected to continue Prolia injections for fracture risk reduction. Continues to tolerate Prolia and is affordable when administered in our office. Will continue this regimen.    Vitamin D level was normal at 67.9 on 1/8/25, calcium normal at 9.2.    Patient will be due for next Prolia shot in April 2024. She will call to make sure it will be shipped a few weeks before she is due.    Follow up in 1 year.                Return in about 1 year (around 1/17/2026) for Follow up.    Portions of this note were completed with voice recognition program.    Electronically signed by JENSEN Flower

## 2025-01-21 NOTE — ASSESSMENT & PLAN NOTE
DEXA scan done 11/2024 shows stability when compared with scan done at [facility] in 20[facility]. Plan to repeat in 2026.    Discussed risk of jaw necrosis vs fracture related to osteoporosis and elected to continue Prolia injections for fracture risk reduction. Continues to tolerate Prolia and is affordable when administered in our office. Will continue this regimen.    Vitamin D level was normal at 67.9 on 1/8/25, calcium normal at 9.2.    Patient will be due for next Prolia shot in April 2024. She will call to make sure it will be shipped a few weeks before she is due.    Follow up in 1 year.

## 2025-02-18 RX ORDER — DENOSUMAB 60 MG/ML
60 INJECTION SUBCUTANEOUS
Qty: 1 ML | Refills: 3 | Status: SHIPPED | OUTPATIENT
Start: 2025-02-18

## 2025-02-18 NOTE — TELEPHONE ENCOUNTER
Rx Refill Note  Requested Prescriptions     Pending Prescriptions Disp Refills    denosumab (Prolia) 60 MG/ML solution prefilled syringe syringe 180 mL 0     Sig: Inject 1 mL under the skin into the appropriate area as directed Every 6 (Six) Months.      Last office visit with prescribing clinician: 1/17/2025     Next office visit with prescribing clinician: 1/20/2026     Fabiola Frederick MA  02/18/25, 11:29 EST

## 2025-02-20 ENCOUNTER — PRIOR AUTHORIZATION (OUTPATIENT)
Dept: ENDOCRINOLOGY | Facility: CLINIC | Age: 65
End: 2025-02-20
Payer: COMMERCIAL

## 2025-02-24 NOTE — TELEPHONE ENCOUNTER
KELLY JONES (Key: [id number])  PA Case ID #: [id number]  Need Help? Call us at [phone]  Outcome  Approved on February 22 by [employer] 2017 [facility]  Approved. Approved for PROLIA Soln Pref Syr 60MG/ML, quantity up to 1 per 180 days, under the pharmacy benefit. The drug has been approved from 02/22/2025 to 02/22/2026. Generic or biosimilar substitution may be required when available and preferred on the formulary. Please note that dispensing of non-maintenance and specialty medications may be limited to a monthly supply.  Effective Date: 2/22/2025  Authorization Expiration Date: 2/22/2026  Drug  Prolia 60MG/ML syringes  ePA cloud logo  Form  [employer] [facility] Electronic Prior Authorization Form 2017 [employer]

## 2025-03-07 ENCOUNTER — TELEPHONE (OUTPATIENT)
Dept: ENDOCRINOLOGY | Facility: CLINIC | Age: 65
End: 2025-03-07
Payer: COMMERCIAL

## 2025-03-13 DIAGNOSIS — M16.11 PRIMARY OSTEOARTHRITIS OF RIGHT HIP: ICD-10-CM

## 2025-03-14 RX ORDER — MELOXICAM 15 MG/1
15 TABLET ORAL DAILY
Qty: 90 TABLET | Refills: 1 | Status: SHIPPED | OUTPATIENT
Start: 2025-03-14

## 2025-03-21 ENCOUNTER — OFFICE VISIT (OUTPATIENT)
Dept: ORTHOPEDIC SURGERY | Facility: CLINIC | Age: 65
End: 2025-03-21
Payer: COMMERCIAL

## 2025-03-21 VITALS
BODY MASS INDEX: 19.88 KG/M2 | SYSTOLIC BLOOD PRESSURE: 122 MMHG | DIASTOLIC BLOOD PRESSURE: 80 MMHG | HEIGHT: 62 IN | WEIGHT: 108 LBS

## 2025-03-21 DIAGNOSIS — M16.11 PRIMARY OSTEOARTHRITIS OF RIGHT HIP: Primary | ICD-10-CM

## 2025-03-21 PROCEDURE — 99214 OFFICE O/P EST MOD 30 MIN: CPT | Performed by: ORTHOPAEDIC SURGERY

## 2025-03-21 RX ORDER — HYDROCODONE BITARTRATE AND ACETAMINOPHEN 5; 325 MG/1; MG/1
1 TABLET ORAL
COMMUNITY
Start: 2025-01-14 | End: 2025-03-21

## 2025-03-21 NOTE — PROGRESS NOTES
Orthopaedic Clinic Note: Hip Established Patient    Chief Complaint   Patient presents with    Follow-up     3 month follow up -- Primary osteoarthritis of right hip        HPI    It has been 3  month(s) since Ms. Brito's last visit. She returns to clinic today for follow-up right hip osteoarthritis.  Patient was last seen 3 months ago.  She was referred to Dr. Mtz for ultrasound-guided hip joint injection which she underwent on 1/2/2025.  That injection provided 1 month of relief.  Her pain has returned.  She rates her pain a 7/10 on the pain scale.  She is taking Tylenol and Advil.  She is here to discuss additional treatment options for worsening hip pain.    Past Medical History:   Diagnosis Date    Annual GYN exam in  12/19/2021    Hip arthrosis 3/24    Hyperlipidemia     Melanoma of hip 1998    Osteopenia     Osteoporosis 2014    Rotator cuff tendinitis of right shoulder     Stress fracture     Tennis elbow       Past Surgical History:   Procedure Laterality Date    BREAST BIOPSY      COLONOSCOPY      MAMMO STEREOTACTIC BREAST BIOPSY 1ST W WO DEVICE Left 10/14/2008    US bx    SKIN CANCER EXCISION Left 1998    Melanoma of left hip    TOOTH EXTRACTION Left 01/14/2025    Left Back    WISDOM TOOTH EXTRACTION Bilateral       Family History   Problem Relation Age of Onset    Breast cancer Mother 75    Colon cancer Mother 65    Cancer Mother     Osteoporosis Mother     Diabetes Father     Lung cancer Father     Arthritis Father     Cancer Father     No Known Problems Sister     No Known Problems Sister     Heart disease Brother     Diabetes Brother     Cancer Brother         Lymphocitic leukemia    No Known Problems Brother     Stroke Maternal Grandmother     Heart disease Maternal Grandfather     Ovarian cancer Maternal Aunt         age unknown    Kidney disease Maternal Aunt     Kidney disease Maternal Uncle     Endometriosis Daughter     No Known Problems Son      Social History     Socioeconomic History     Marital status:     Number of children: 2    Highest education level: Bachelor's degree (e.g., BA, AB, BS)   Tobacco Use    Smoking status: Never     Passive exposure: Never    Smokeless tobacco: Never    Tobacco comments:     Parents smoked in the home   Vaping Use    Vaping status: Never Used   Substance and Sexual Activity    Alcohol use: Yes     Alcohol/week: 11.0 standard drinks of alcohol     Types: 7 Glasses of wine, 4 Shots of liquor per week    Drug use: No    Sexual activity: Yes     Partners: Male     Birth control/protection: Post-menopausal, Surgical     Comment:       Current Outpatient Medications on File Prior to Visit   Medication Sig Dispense Refill    calcium carbonate (OS-JAKE) 600 MG tablet Take 1 tablet by mouth Daily.      cholecalciferol (VITAMIN D3) 1000 units tablet Take 2 tablets by mouth Daily.      denosumab (Prolia) 60 MG/ML solution prefilled syringe syringe Inject 1 mL under the skin into the appropriate area as directed Every 6 (Six) Months. 1 mL 3    Multiple Vitamins-Minerals (MULTIVITAMIN ADULT PO) Take 1 tablet by mouth Daily.      [DISCONTINUED] HYDROcodone-acetaminophen (NORCO) 5-325 MG per tablet Take 1 tablet by mouth every 6 (six) to 8 (eight) hours as needed for Pain.      [DISCONTINUED] meloxicam (MOBIC) 15 MG tablet TAKE 1 TABLET BY MOUTH EVERY DAY WITH FOOD 90 tablet 1     No current facility-administered medications on file prior to visit.      No Known Allergies     Review of Systems   Constitutional: Negative.    HENT: Negative.     Eyes: Negative.    Respiratory: Negative.     Cardiovascular: Negative.    Gastrointestinal: Negative.    Endocrine: Negative.    Genitourinary: Negative.    Musculoskeletal:  Positive for arthralgias.   Skin: Negative.    Allergic/Immunologic: Negative.    Neurological: Negative.    Hematological: Negative.    Psychiatric/Behavioral: Negative.          The patient's Review of Systems was personally reviewed and confirmed as  "accurate.    Physical Exam  Blood pressure 122/80, height 157.5 cm (62\"), weight 49 kg (108 lb), not currently breastfeeding.    Body mass index is 19.75 kg/m².    GENERAL APPEARANCE: awake, alert, oriented, in no acute distress and well developed, well nourished  LUNGS:  breathing nonlabored  EXTREMITIES: no clubbing, cyanosis  PERIPHERAL PULSES: palpable dorsalis pedis and posterior tibial pulses bilaterally.    GAIT:  Antalgic            Hip Exam:  Right     RANGE OF MOTION:  EXTENSION/FLEXION:  normal (0-110 degrees)  IR (at 90 degrees of flexion):  20  ER (at 90 degrees of flexion):  30  PAIN WITH HIP MOTION:  yes, localized to groin  PAIN WITH LOGROLL:  no      [facility]     STRENGTH:  ABDUCTOR:    5/5  ADDUCTOR:  5/5  HIP FLEXION:  5/5     GREATER TROCHANTER BURSAL PAIN:  no    SENSATION TO LIGHT TOUCH:  DEEP PERONEAL/SUPERFICIAL PERONEAL/SURAL/SAPHENOUS/TIBIAL:   intact    EDEMA:  no  ERYTHEMA:  no  WOUNDS/INCISIONS:   no  _________________________________________________________________  _________________________________________________________________    RADIOGRAPHIC FINDINGS:   Indication: Right hip pain    Comparison: Todays xrays were compared to previous xrays from 9/13/2024    AP pelvis: Right: advanced, end-stage osteoarthritis with bone on bone articulation, subchondral sclerosis, and subchondral cysts, there are marginal osteophytes visualized at the femoral head-neck junction and acetabular margins and Radiographs demonstrate advancing arthritic changes and wear compared to prior radiographs.;Left: mild joint space narrowing, there are marginal osteophytes visualized at the femoral head-neck junction and acetabular margins and No significant changes compared to prior radiographs.      Assessment/Plan:   Diagnosis Plan   1. Primary osteoarthritis of right hip  XR Hip With or Without Pelvis 2 - 3 View Right        The patient has failed conservative treatment measures and is a " candidate for joint arthroplasty.  I discussed the joint arthroplasty surgical process as well as the recovery and rehabilitation time frame.  The patient asked several questions regarding the joint arthroplasty surgery, which were answered accordingly.  Specifically she had questions regarding anterior approach versus posterior approach which were answered to her satisfaction.  Ultimately, the patient declines surgical intervention at this time and wishes to continue with conservative treatment measures.  Alternative conservative treatment measures were discussed including therapy, topical/oral anti-inflammatories, and activity modification.  Patient consider these treatment options and wishes to pursue conservative treatment with over-the-counter anti-inflammatories and Tylenol.  She is wanting to wait until later date to do surgery as she has several things she wants to consider before signing up for surgery.  She will follow-up with me as needed.    Rodrick Verduzco MD  03/21/25  12:19 EDT

## 2025-04-07 ENCOUNTER — OFFICE VISIT (OUTPATIENT)
Dept: ORTHOPEDIC SURGERY | Facility: CLINIC | Age: 65
End: 2025-04-07
Payer: COMMERCIAL

## 2025-04-07 VITALS
SYSTOLIC BLOOD PRESSURE: 126 MMHG | WEIGHT: 108 LBS | DIASTOLIC BLOOD PRESSURE: 84 MMHG | HEIGHT: 62 IN | BODY MASS INDEX: 19.88 KG/M2

## 2025-04-07 DIAGNOSIS — M16.11 PRIMARY OSTEOARTHRITIS OF RIGHT HIP: Primary | ICD-10-CM

## 2025-04-07 PROBLEM — M16.9 DEGENERATIVE ARTHRITIS OF HIP: Status: ACTIVE | Noted: 2025-04-07

## 2025-04-07 PROCEDURE — 99214 OFFICE O/P EST MOD 30 MIN: CPT | Performed by: ORTHOPAEDIC SURGERY

## 2025-04-07 RX ORDER — PREGABALIN 150 MG/1
150 CAPSULE ORAL ONCE
OUTPATIENT
Start: 2025-04-07 | End: 2025-04-07

## 2025-04-07 RX ORDER — ACETAMINOPHEN 325 MG/1
1000 TABLET ORAL ONCE
OUTPATIENT
Start: 2025-04-07 | End: 2025-04-07

## 2025-04-07 RX ORDER — CHLORHEXIDINE GLUCONATE 40 MG/ML
1 SOLUTION TOPICAL DAILY
Qty: 236 ML | Refills: 0 | Status: SHIPPED | OUTPATIENT
Start: 2025-04-07

## 2025-04-07 NOTE — PROGRESS NOTES
[facility] Note    Subjective     Chief Complaint   Patient presents with    Right Hip - Pain        HPI    Kimberly Brito is a 64 y.o. female who presents with new problem of: right hip pain.  Onset: atraumatic and gradual in nature. The issue has been ongoing for 1 year(s). Pain is a 6/10 on the pain scale. Pain is described as dull and aching. Associated symptoms include pain. The pain is worse with walking, standing, sitting, climbing stairs, lying on affected side, and rising from seated position; sitting and pain medication and/or NSAID improve the pain. Previous treatments have included: NSAIDS and physical therapy.  Previous hip injection with good brief relief.  She has reached point where she would like to proceed with total hip arthroplasty surgery.  She is interested in an anterior approach.  Her  is able to help out postoperatively.  No history of clots or clotting disorders.  No blood thinners.  She is a candidate for outpatient surgery.    I have reviewed the following portions of the patient's history and agree with: History of Present Illness and Review of Systems    Patient Active Problem List   Diagnosis    Osteoporosis    Family history of ovarian cancer    Family history of breast cancer in mother    Annual physical exam    Thyroid nodule    Annual GYN exam in     Family history of colon cancer    Eczema    Mixed hyperlipidemia    Right hip pain    Primary osteoarthritis of right hip    Degenerative arthritis of hip     Past Medical History:   Diagnosis Date    Annual GYN exam in  12/19/2021    Degenerative arthritis of hip 4/7/2025    Hip arthrosis 3/24    Hyperlipidemia     Melanoma of hip 1998    Osteopenia     Osteoporosis 2014    Rotator cuff tendinitis of right shoulder     Stress fracture     Tennis elbow       Past Surgical History:   Procedure Laterality Date    BREAST BIOPSY      COLONOSCOPY      MAMMO STEREOTACTIC BREAST BIOPSY 1ST W WO DEVICE Left  10/14/2008     bx    SKIN CANCER EXCISION Left 1998    Melanoma of left hip    TOOTH EXTRACTION Left 01/14/2025    Left Back    WISDOM TOOTH EXTRACTION Bilateral       Family History   Problem Relation Age of Onset    Breast cancer Mother 75    Colon cancer Mother 65    Cancer Mother     Osteoporosis Mother     Diabetes Father     Lung cancer Father     Arthritis Father     Cancer Father     No Known Problems Sister     No Known Problems Sister     Heart disease Brother     Diabetes Brother     Cancer Brother         Lymphocitic leukemia    No Known Problems Brother     Stroke Maternal Grandmother     Heart disease Maternal Grandfather     Ovarian cancer Maternal Aunt         age unknown    Kidney disease Maternal Aunt     Kidney disease Maternal Uncle     Endometriosis Daughter     No Known Problems Son      Social History     Socioeconomic History    Marital status:     Number of children: 2    Highest education level: Bachelor's degree (e.g., BA, AB, BS)   Tobacco Use    Smoking status: Never     Passive exposure: Never    Smokeless tobacco: Never    Tobacco comments:     Parents smoked in the home   Vaping Use    Vaping status: Never Used   Substance and Sexual Activity    Alcohol use: Yes     Alcohol/week: 11.0 standard drinks of alcohol     Types: 7 Glasses of wine, 4 Shots of liquor per week    Drug use: No    Sexual activity: Yes     Partners: Male     Birth control/protection: Post-menopausal, Surgical     Comment:       Current Outpatient Medications on File Prior to Visit   Medication Sig Dispense Refill    calcium carbonate (OS-JAKE) 600 MG tablet Take 1 tablet by mouth Daily.      cholecalciferol (VITAMIN D3) 1000 units tablet Take 2 tablets by mouth Daily.      denosumab (Prolia) 60 MG/ML solution prefilled syringe syringe Inject 1 mL under the skin into the appropriate area as directed Every 6 (Six) Months. 1 mL 3    EXTRA STRENGTH ACETAMINOPHEN PO       Multiple Vitamins-Minerals  (MULTIVITAMIN ADULT PO) Take 1 tablet by mouth Daily.       No current facility-administered medications on file prior to visit.      No Known Allergies     Review of Systems   Constitutional:  Negative for activity change, appetite change, chills, diaphoresis, fatigue, fever and unexpected weight change.   HENT:  Negative for congestion, dental problem, drooling, ear discharge, ear pain, facial swelling, hearing loss, mouth sores, nosebleeds, postnasal drip, rhinorrhea, sinus pressure, sneezing, sore throat, tinnitus, trouble swallowing and voice change.    Eyes:  Negative for photophobia, pain, discharge, redness, itching and visual disturbance.   Respiratory:  Negative for apnea, cough, choking, chest tightness, shortness of breath, wheezing and stridor.    Cardiovascular:  Negative for chest pain, palpitations and leg swelling.   Gastrointestinal:  Negative for abdominal distention, abdominal pain, anal bleeding, blood in stool, constipation, diarrhea, nausea, rectal pain and vomiting.   Endocrine: Negative for cold intolerance, heat intolerance, polydipsia, polyphagia and polyuria.   Genitourinary:  Negative for decreased urine volume, difficulty urinating, dysuria, enuresis, flank pain, frequency, genital sores, hematuria and urgency.   Musculoskeletal:  Positive for arthralgias. Negative for back pain, gait problem, joint swelling, myalgias, neck pain and neck stiffness.   Skin:  Negative for color change, pallor, rash and wound.   Allergic/Immunologic: Negative for environmental allergies, food allergies and immunocompromised state.   Neurological:  Negative for dizziness, tremors, seizures, syncope, facial asymmetry, speech difficulty, weakness, light-headedness, numbness and headaches.   Hematological:  Negative for adenopathy. Does not bruise/bleed easily.   Psychiatric/Behavioral:  Negative for agitation, behavioral problems, confusion, decreased concentration, dysphoric mood, hallucinations,  "self-injury, sleep disturbance and suicidal ideas. The patient is not nervous/anxious and is not hyperactive.         Objective      Physical Exam  /84   Ht 157.5 cm (62\")   Wt 49 kg (108 lb)   LMP  (LMP Unknown)   BMI 19.75 kg/m²     Body mass index is 19.75 kg/m².  BMI is within normal parameters. No other follow-up for BMI required.        General:   Mental Status:  Alert   Appearance: Cooperative, in no acute distress   Build and Nutrition: Well-nourished well-developed female   Orientation: Alert and oriented to person, place and time   Posture: Normal   Gait: Nonantalgic    Integument:   Right hip: No skin lesions, no rash, no ecchymosis    Neurologic:   Sensation:    Right foot: Intact to light touch on the dorsal and plantar aspect   Motor:  Right lower extremity: 5/5 quadriceps, hamstrings, ankle dorsiflexors, and ankle plantar flexors    Vascular:   Right lower extremity: 2+ dorsalis pedis pulse, prompt capillary refill    Lower Extremities:   Right Hip:    Tenderness:  None    Swelling: None    Crepitus:  None    Atrophy:  None    Range of motion:  External Rotation: 30°       Internal Rotation: 20°, with pain       Flexion:  100°       Extension:  0°   Instability:  None  Deformities:  None  Functional testing: Positive Stinchfield    No leg length discrepancy      Imaging/Studies      Imaging Results (Last 24 Hours)       ** No results found for the last 24 hours. **          Ordered by Rodrick Verduzco MD on 3/21/2025.     Narrative & Impression    Indication: Right hip pain     Comparison: Todays xrays were compared to previous xrays from 9/13/2024    AP pelvis: Right: advanced, end-stage osteoarthritis with bone on bone articulation, subchondral sclerosis, and subchondral cysts, there are marginal osteophytes visualized at the femoral head-neck junction and acetabular margins and Radiographs demonstrate advancing arthritic changes and wear compared to prior radiographs.;Left: mild joint " space narrowing, there are marginal osteophytes visualized at the femoral head-neck junction and acetabular margins and No significant changes compared to prior radiographs.       I reviewed the above imaging, and agree with findings.  Hip arthritis.    Assessment and Plan     Diagnoses and all orders for this visit:    1. Primary osteoarthritis of right hip (Primary)  -     Case Request; Standing  -     Instructions on coughing, deep breathing, and incentive spirometry.; Future  -     CBC and Differential; Future  -     Basic metabolic panel; Future  -     Protime-INR; Future  -     APTT; Future  -     Hemoglobin A1c; Future  -     Sedimentation rate; Future  -     C-reactive protein; Future  -     Tranexamic Acid 1,000 mg in sodium chloride 0.9 % 100 mL  -     Tranexamic Acid 1,000 mg in sodium chloride 0.9 % 100 mL  -     ethyl alcohol 62 % 2 each  -     ceFAZolin (ANCEF) 2 g in sodium chloride 0.9 % 100 mL IVPB  -     acetaminophen (TYLENOL) tablet 975 mg  -     pregabalin (LYRICA) capsule 150 mg  -     Case Request    Other orders  -     Outpatient In A Bed; Standing  -     Follow Anesthesia Guidelines / Protocol; Future  -     Follow Anesthesia Guidelines / Protocol; Standing  -     Verify NPO Status; Standing  -     Verify The Time Patient Completed ERAS Hydration Drink; Standing  -     SCD (sequential compression device)- to be placed on patient in Pre-op; Standing  -     Clip operative site; Standing  -     Provide Patient With Carbo Loading Instructions  -     Provide Patient With ERAS Booklet(s)/Handout  -     Chlorhexidine Gluconate 4 % solution; Apply 1 Application topically to the appropriate area as directed Daily. Shower with hibiclens solution as directed for 5 days prior to surgery  Dispense: 236 mL; Refill: 0        1. Primary osteoarthritis of right hip          I reviewed my findings with the patient.  She is a candidate for right total hip arthroplasty surgery, as she has exhausted conservative  treatment options and is interested in proceeding.  Risks, benefits, alternatives been discussed.  Please see my counseling note for details.    Surgical Counseling     I have informed the patient of the diagnosis and the prognosis.  Exhaustive conservative treatment modalities have not resulted in long term pain relief.  The symptoms have progressed to the point of daily pain and inability to perform activities of daily living without significant pain.  The patient has reached the point of desiring to proceed with total hip arthroplasty after discussing the risks, benefits and alternatives to the procedure.  The surgical procedure itself was discussed in detail.  Risks of the procedure were discussed, which included but are not limited to, bleeding, infection, damage to blood vessels and nerves, incomplete pain relief, loosening of the prosthesis (early or late), deep infection (early or late), need for further surgery, leg length discrepancy, hip dislocation, loss of limb, deep venous thrombosis, pulmonary embolus, death, heart attack, stroke, kidney failure, liver failure, and anesthetic complications.  In addition, the potential for deep infection developing in the future was discussed, which could require further surgery.  The hip would have to be re-opened, debrided, and potentially remove the prosthesis, which may or may not be replaced in the future.  Also, the possibility for loosening of the prosthesis has been mentioned.  If the prosthesis loosened, a revision arthroplasty could be performed, with results that are not as predictable compared to the original procedure.  The typical rehabilitative course has also been discussed, and full recovery may take up to a year to see the maximum benefit.  The importance of patient cooperation in the rehabilitative efforts has also been discussed.  No guarantees were given.  The patient understands the potential risks versus the benefits and desires to proceed with  total hip arthroplasty at a mutually convenient time.     Return for surgery.      Familia Yao MD  04/07/25  15:10 EDT      Dictated Utilizing Dragon Dictation

## 2025-04-08 ENCOUNTER — OFFICE VISIT (OUTPATIENT)
Dept: OBSTETRICS AND GYNECOLOGY | Facility: CLINIC | Age: 65
End: 2025-04-08
Payer: COMMERCIAL

## 2025-04-08 VITALS
RESPIRATION RATE: 14 BRPM | SYSTOLIC BLOOD PRESSURE: 124 MMHG | WEIGHT: 112 LBS | DIASTOLIC BLOOD PRESSURE: 76 MMHG | BODY MASS INDEX: 20.49 KG/M2

## 2025-04-08 DIAGNOSIS — Z80.41 FAMILY HISTORY OF OVARIAN CANCER: ICD-10-CM

## 2025-04-08 DIAGNOSIS — Z80.3 FAMILY HISTORY OF BREAST CANCER IN MOTHER: Primary | ICD-10-CM

## 2025-04-08 PROCEDURE — 99213 OFFICE O/P EST LOW 20 MIN: CPT | Performed by: OBSTETRICS & GYNECOLOGY

## 2025-04-08 NOTE — PROGRESS NOTES
Subjective   Chief Complaint   Patient presents with    breast exam     Kimberly Brito is a 64 y.o. year old  presenting to be seen because of an elevated lifetime risk of breast cancer.  Previously we discussed chemoprophylaxis and she has chosen not to have that done.  She does participate in [facility] ovarian cancer screening.  She would like to continue with breast MRIs but because of cost she is not sure she can to be able to continue to do that.  In the past we talked about prophylactic mastectomy and she still has no interest at this point in that option.    Last mammogram: was done on approximately 2024 and the result was: Birads I (Normal).  Last breast MRI: was done on approximately 2024 and the result was Birads I (Normal).    The following portions of the patient's history were reviewed and updated as appropriate:current medications and allergies    Social History    Tobacco Use      Smoking status: Never        Passive exposure: Never      Smokeless tobacco: Never      Tobacco comments: Parents smoked in the home    Review of Systems  Constitutional POS: nothing reported    NEG: anorexia or night sweats   Genitourinary POS: nothing reported    NEG: dysuria or hematuria   Gastointestinal POS: nothing reported    NEG: bloating, change in bowel habits, melena, or reflux symptoms   Integument POS: nothing reported    NEG: moles that are changing in size, shape, color or rashes   Breast POS: nothing reported    NEG: persistent breast lump, skin dimpling, or nipple discharge        Objective   /76   Resp 14   Wt 50.8 kg (112 lb)   LMP  (LMP Unknown)   BMI 20.49 kg/m²     General:  well developed; well nourished  no acute distress  mentation appropriate   Breasts:  Examined in supine position  Symmetric without masses or skin dimpling  Nipples normal without inversion, lesions or discharge  There are no palpable axillary nodes     Lab Review   No data reviewed    Imaging   Breast MRI  report  Mammogram report       Assessment   FHx of breast CA Lifetime risk of breast cancer > 20% - genetic testing declined  FHx of ovarian cancer - genetic testing declined.  Patient already participates in [facility] ovarian cancer screening study     Plan   She was encouraged to get yearly mammograms.  She should report any palpable breast lump(s) or skin changes regardless of mammographic findings.  I explained to Kimberly that notification regarding her mammogram results will come from the center performing the study.  Our office will not be routinely calling with mammogram results.  It is her responsibility to make sure that the results from the mammogram are communicated to her by the breast center.  If she has any questions about the results, she is welcome to call our office anytime.  The importance of keeping all planned follow-up and taking all medications as prescribed was emphasized.  Follow up for annual exam and for clinical breast exam 6 months         This note was electronically signed.    Rico Vargas M.D.  April 8, 2025    Part of this note may be an electronic transcription/translation of spoken language to printed text using the Dragon Dictation System.

## 2025-04-11 ENCOUNTER — CLINICAL SUPPORT (OUTPATIENT)
Dept: ENDOCRINOLOGY | Facility: CLINIC | Age: 65
End: 2025-04-11
Payer: COMMERCIAL

## 2025-04-11 DIAGNOSIS — M81.0 AGE-RELATED OSTEOPOROSIS WITHOUT CURRENT PATHOLOGICAL FRACTURE: Primary | ICD-10-CM

## 2025-06-04 DIAGNOSIS — M16.11 PRIMARY OSTEOARTHRITIS OF RIGHT HIP: Primary | ICD-10-CM

## 2025-06-10 ENCOUNTER — PRE-ADMISSION TESTING (OUTPATIENT)
Dept: PREADMISSION TESTING | Facility: HOSPITAL | Age: 65
End: 2025-06-10
Payer: COMMERCIAL

## 2025-06-10 ENCOUNTER — PRE-PROCEDURE SCREENING (OUTPATIENT)
Dept: ORTHOPEDIC SURGERY | Facility: CLINIC | Age: 65
End: 2025-06-10
Payer: COMMERCIAL

## 2025-06-10 VITALS — WEIGHT: 110.12 LBS | BODY MASS INDEX: 20.79 KG/M2 | HEIGHT: 61 IN

## 2025-06-10 DIAGNOSIS — M16.11 PRIMARY OSTEOARTHRITIS OF RIGHT HIP: ICD-10-CM

## 2025-06-10 LAB
ANION GAP SERPL CALCULATED.3IONS-SCNC: 10 MMOL/L (ref 5–15)
APTT PPP: 26.1 SECONDS (ref 22–39)
BASOPHILS # BLD AUTO: 0.02 10*3/MM3 (ref 0–0.2)
BASOPHILS NFR BLD AUTO: 0.4 % (ref 0–1.5)
BUN SERPL-MCNC: 10.1 MG/DL (ref 8–23)
BUN/CREAT SERPL: 11.6 (ref 7–25)
CALCIUM SPEC-SCNC: 9.1 MG/DL (ref 8.6–10.5)
CHLORIDE SERPL-SCNC: 101 MMOL/L (ref 98–107)
CO2 SERPL-SCNC: 27 MMOL/L (ref 22–29)
CREAT SERPL-MCNC: 0.87 MG/DL (ref 0.57–1)
CRP SERPL-MCNC: <0.3 MG/DL (ref 0–0.5)
DEPRECATED RDW RBC AUTO: 41.4 FL (ref 37–54)
EGFRCR SERPLBLD CKD-EPI 2021: 75 ML/MIN/1.73
EOSINOPHIL # BLD AUTO: 0.11 10*3/MM3 (ref 0–0.4)
EOSINOPHIL NFR BLD AUTO: 2 % (ref 0.3–6.2)
ERYTHROCYTE [DISTWIDTH] IN BLOOD BY AUTOMATED COUNT: 12.7 % (ref 12.3–15.4)
ERYTHROCYTE [SEDIMENTATION RATE] IN BLOOD: 21 MM/HR (ref 0–30)
GLUCOSE SERPL-MCNC: 93 MG/DL (ref 65–99)
HBA1C MFR BLD: 5.1 % (ref 4.8–5.6)
HCT VFR BLD AUTO: 41.7 % (ref 34–46.6)
HGB BLD-MCNC: 14 G/DL (ref 12–15.9)
IMM GRANULOCYTES # BLD AUTO: 0.01 10*3/MM3 (ref 0–0.05)
IMM GRANULOCYTES NFR BLD AUTO: 0.2 % (ref 0–0.5)
INR PPP: 0.91 (ref 0.89–1.12)
LYMPHOCYTES # BLD AUTO: 0.89 10*3/MM3 (ref 0.7–3.1)
LYMPHOCYTES NFR BLD AUTO: 16.1 % (ref 19.6–45.3)
MCH RBC QN AUTO: 30.5 PG (ref 26.6–33)
MCHC RBC AUTO-ENTMCNC: 33.6 G/DL (ref 31.5–35.7)
MCV RBC AUTO: 90.8 FL (ref 79–97)
MONOCYTES # BLD AUTO: 0.42 10*3/MM3 (ref 0.1–0.9)
MONOCYTES NFR BLD AUTO: 7.6 % (ref 5–12)
NEUTROPHILS NFR BLD AUTO: 4.09 10*3/MM3 (ref 1.7–7)
NEUTROPHILS NFR BLD AUTO: 73.7 % (ref 42.7–76)
NRBC BLD AUTO-RTO: 0 /100 WBC (ref 0–0.2)
PLATELET # BLD AUTO: 199 10*3/MM3 (ref 140–450)
PMV BLD AUTO: 10.2 FL (ref 6–12)
POTASSIUM SERPL-SCNC: 3.9 MMOL/L (ref 3.5–5.2)
PROTHROMBIN TIME: 12.7 SECONDS (ref 12.2–15.3)
RBC # BLD AUTO: 4.59 10*6/MM3 (ref 3.77–5.28)
SODIUM SERPL-SCNC: 138 MMOL/L (ref 136–145)
WBC NRBC COR # BLD AUTO: 5.54 10*3/MM3 (ref 3.4–10.8)

## 2025-06-10 PROCEDURE — 85730 THROMBOPLASTIN TIME PARTIAL: CPT

## 2025-06-10 PROCEDURE — 85652 RBC SED RATE AUTOMATED: CPT

## 2025-06-10 PROCEDURE — 85610 PROTHROMBIN TIME: CPT

## 2025-06-10 PROCEDURE — 86140 C-REACTIVE PROTEIN: CPT

## 2025-06-10 PROCEDURE — 80048 BASIC METABOLIC PNL TOTAL CA: CPT

## 2025-06-10 PROCEDURE — 36415 COLL VENOUS BLD VENIPUNCTURE: CPT

## 2025-06-10 PROCEDURE — 85025 COMPLETE CBC W/AUTO DIFF WBC: CPT

## 2025-06-10 PROCEDURE — 83036 HEMOGLOBIN GLYCOSYLATED A1C: CPT

## 2025-06-10 NOTE — PAT
An arrival time for procedure was not provided during PAT visit. If patient had any questions or concerns about their arrival time, they were instructed to contact their surgeon/physician. Additionally, if the patient referred to an arrival time that was acquired from their my chart account, patient was encouraged to verify that time with their surgeon/physician. Arrival times are NOT provided in Pre Admission Testing Department.    Patient viewed general PAT education video as instructed in their preoperative information received from their surgeon.  Patient stated the general PAT education video was viewed in its entirety and survey completed. Copies of PAT general education handouts (Incentive Spirometry, Meds to Beds Program, Patient Belongings, Pre-op skin preparation instructions, Blood Glucose testing, Visitor policy, Surgery FAQ, Code H) distributed to patient if not printed. Education related to the PAT pass and skin preparation for surgery (if applicable) completed in PAT as a reinforcement to PAT education video. Patient instructed to return PAT pass provided today as well as completed skin preparation sheet (if applicable) on the day of procedure.     Additionally if patient had not viewed video yet but intended to view it at home or in our waiting area, then referred them to the handout with QR code/link provided during PAT visit. Instructed patient to complete survey after viewing the video in its entirety.  Encouraged patient/family to read PAT general education handouts thoroughly and notify PAT staff with any questions or concerns. Patient verbalized understanding of all information and priority content.    Patient denies any current skin issues.     Patient to apply Chlorhexadine wipes  to surgical area (as instructed) the night before procedure and the AM of procedure. Wipes provided.    Prescription for Chlorhexidine shower called into patient's pharmacy or [facility] pharmacy by patient's surgeon.  Reinforced with patient to  the prescription from applicable pharmacy if they haven't already. Verbal and written instructions given regarding proper use of Chlorhexidine body wash to patient and/or famlily during PAT visit. Patient/family also instructed to complete checklist and return it to Pre-op on the day of surgery.  Patient and/or family verbalized understanding.    Patient instructed to drink 20 ounces of Gatorade or Gatorlyte (if diabetic) and it needs to be completed 1 hour (for Main OR patients) or 2 hours (scheduled  section & [facility] patients) before given arrival time for procedure (NO RED Gatorade and NO Gatorade Zero).    Patient verbalized understanding.    Post-Surgery Information Instruction Sheet given to patient during Pre-Admission Testing Visit with verbal instructions to patient to return with PAT PASS on the day of surgery. Additionally, encouraged patient to review the information provided.    Discussed with patient options for receiving total joint replacement education and assessed patient's ability and preference. Joint Replacement Guide given to patient during PAT visit since not received a copy within the last year. Encouraged patient/family to read guide thoroughly and notify PAT staff with any questions or concerns. Handout provided directing patient to links to watch online videos related to joint replacement surgery on the [facility] website. The handout gives detailed instructions for joining an online joint replacement class through Zoom or phone conference offered on . Patient agreed to participate by watching videos online. Patient verbalized understanding of instructions and to complete the online learning tool survey. Encouraged to share information with family and/or . An overview of the joint replacement education was provided during the visit including general perioperative instructions that are routine for all surgical patients (PAT  PASS, wipes, directions to pre-op, etc.).

## 2025-06-10 NOTE — TELEPHONE ENCOUNTER
TOTAL JOINT REPLACEMENT CARE NAVIGATION INITIAL ASSESSMENT    PATIENT INFORMATION:     Patient: Kimberly Brito       YOB: 1960         Age/Gender: 64 y.o. female     Medical Record Number: [id number]     Surgery:     R ANTIONE            Surgery Date: 6/24/25    Surgeon: Dr. Yao    Physical Therapy Location: [facility]    PT Date & Time: 6/26/25 @ 1pm    DVT PPX: ASA 81mg BID    DISCHARGE PLAN: Outpatient      LIVING SITUATION:     [x]Private Residence      Who lives in the home?                          []Nursing Home:                                     []Assisted Living  []Rehabilitation Facility:                          []Live Alone  []Homeless    HOUSING STYLE:     [x]Ranch Style   []Multi-level   []Split level   []Townhouse   []Apartment    Do you have steps to navigate in the home? No  Do you have steps to navigate outside the home? 1 step      ADL:     [x]Independent   []Independent with difficulty   []Partially Dependent   []Dependent    Do you require bathing/showering?   Do you require assistance with grooming (brushing hair, shaving, etc)?  Do you require assistance dressing?  Do you require assistance with walking?  Do you require assistive devices while walking (cane, walker, wheelchair)?  Do you require assistance with transfers (moving from bed to chair, wheelchair, etc)?  Do you require assistance preparing meals?  Do you require assistance when eating meals?  Do you require assistance to use the toilet?  Do you have any issues with bowel or bladder incontinence?  Do you require assistance with household chores (cleaning, laundry, etc)?  Have you had any recent falls? No    CURRENT SERVICES:    []Home Health (PT, OT, Skilled Nursing)  Agency:    County:  []Palliative Care  []Meals on Wheels  []Daily Caregiver  [x]None    CURRENT DME:    []Walker   []Cane   []Wheelchair   []Crutches   []Bedside Commode   []Shower Chair  []Hospital Bed   []Nebulizer    []Oxygen  []Other:    MEDICATIONS:    Are you currently on any blood thinners? No  Are you currently on any anti-inflammatory medications? No  Are you currently on any medications for rheumatoid arthritis? No  Are you currently taking any herbal supplements? Turmeric-will hold 7 days      Post-op Pharmacy Name:     [facility]           Phone Number:     Does anyone assist you filling medication boxes or remind you that medication is due?   Are you currently on a mail order prescription plan?  What pharmacy do you use to fill your short-term prescriptions? [email or web address]  Do you have prescription insurance coverage?   Can you afford your medications/co-pays?    CURRENT TRANSPORTATION:    Which services do you rely on? []Taxi   []Public Transportation   [x]Private Vehicle     []Ambulance   []Tack ([facility]portation [facility])    Do you have a way to get home when you are discharged? Yes-, Jose J    POTENTIAL NEEDS:    []Rehabilitation   []Home Health PT   []SNF  []Meals on Wheels   []Department of    []Palliative Care  []Nursing Home   []Hospice   [x]Durable Medical Equipment-has all DMEs  []Caregiver Services   []Financial Services   []Pre-op In Home PT Evaluation    CLEARANCES NEEDED FOR SURGERY:    []Dental   []Cardiology   []Pulmonology   []Medical (PCP)   []Rheumatology  []Endocrinology   []Hematology/Oncology   []Neurology   []Neurosurgery   []CT Vascular      INITIAL DISCHARGE PLAN: Plan for same day discharge; Patient's , Jose J, will be surgery , as well as, assisting in post-op care. Patient has all DMEs. Patient informed about [facility] program. Post-op PT scheduled with [facility] on 6/26/25 @ 1pm.

## 2025-06-23 ENCOUNTER — TRANSCRIBE ORDERS (OUTPATIENT)
Dept: ADMINISTRATIVE | Facility: HOSPITAL | Age: 65
End: 2025-06-23
Payer: COMMERCIAL

## 2025-06-23 ENCOUNTER — TELEPHONE (OUTPATIENT)
Dept: ORTHOPEDIC SURGERY | Facility: CLINIC | Age: 65
End: 2025-06-23
Payer: COMMERCIAL

## 2025-06-23 ENCOUNTER — ANESTHESIA EVENT (OUTPATIENT)
Dept: PERIOP | Facility: HOSPITAL | Age: 65
End: 2025-06-23
Payer: COMMERCIAL

## 2025-06-23 DIAGNOSIS — Z12.31 SCREENING MAMMOGRAM FOR BREAST CANCER: Primary | ICD-10-CM

## 2025-06-23 RX ORDER — FAMOTIDINE 10 MG/ML
20 INJECTION, SOLUTION INTRAVENOUS ONCE
Status: CANCELLED | OUTPATIENT
Start: 2025-06-23 | End: 2025-06-23

## 2025-06-23 NOTE — TELEPHONE ENCOUNTER
NEEDS TO KNOW IF SHE CAN GET THE HYPOALLERGENIC WIPES AS SHE USES ITEMS FOR SENSITIVE SKIN?      ALSO HAS NOT BEEN CONTACTED ABOUT SURGERY TIME.         PLEASE ADVISE

## 2025-06-24 ENCOUNTER — HOSPITAL ENCOUNTER (OUTPATIENT)
Facility: HOSPITAL | Age: 65
Discharge: HOME | End: 2025-06-24
Attending: ORTHOPAEDIC SURGERY | Admitting: ORTHOPAEDIC SURGERY
Payer: COMMERCIAL

## 2025-06-24 ENCOUNTER — ANESTHESIA (OUTPATIENT)
Dept: PERIOP | Facility: HOSPITAL | Age: 65
End: 2025-06-24
Payer: COMMERCIAL

## 2025-06-24 ENCOUNTER — APPOINTMENT (OUTPATIENT)
Dept: GENERAL RADIOLOGY | Facility: HOSPITAL | Age: 65
End: 2025-06-24
Payer: COMMERCIAL

## 2025-06-24 VITALS
SYSTOLIC BLOOD PRESSURE: 106 MMHG | RESPIRATION RATE: 14 BRPM | BODY MASS INDEX: 20.24 KG/M2 | DIASTOLIC BLOOD PRESSURE: 62 MMHG | WEIGHT: 110 LBS | OXYGEN SATURATION: 96 % | TEMPERATURE: 97.3 F | HEART RATE: 107 BPM | HEIGHT: 62 IN

## 2025-06-24 DIAGNOSIS — M16.11 PRIMARY OSTEOARTHRITIS OF RIGHT HIP: ICD-10-CM

## 2025-06-24 DIAGNOSIS — Z96.641 STATUS POST RIGHT HIP REPLACEMENT: Primary | ICD-10-CM

## 2025-06-24 PROCEDURE — C1776 JOINT DEVICE (IMPLANTABLE): HCPCS | Performed by: ORTHOPAEDIC SURGERY

## 2025-06-24 PROCEDURE — 25010000002 PROPOFOL 10 MG/ML EMULSION: Performed by: ANESTHESIOLOGY

## 2025-06-24 PROCEDURE — 25010000002 DEXAMETHASONE PER 1 MG: Performed by: ANESTHESIOLOGY

## 2025-06-24 PROCEDURE — 73502 X-RAY EXAM HIP UNI 2-3 VIEWS: CPT

## 2025-06-24 PROCEDURE — 25810000003 LACTATED RINGERS PER 1000 ML: Performed by: ANESTHESIOLOGY

## 2025-06-24 PROCEDURE — 25010000002 LIDOCAINE PF 1% 1 % SOLUTION: Performed by: ANESTHESIOLOGY

## 2025-06-24 PROCEDURE — 25010000002 ROPIVACAINE PER 1 MG: Performed by: ORTHOPAEDIC SURGERY

## 2025-06-24 PROCEDURE — 25010000002 PHENYLEPHRINE 10 MG/ML SOLUTION 1 ML VIAL: Performed by: ANESTHESIOLOGY

## 2025-06-24 PROCEDURE — 76000 FLUOROSCOPY <1 HR PHYS/QHP: CPT

## 2025-06-24 PROCEDURE — 25010000002 ONDANSETRON PER 1 MG: Performed by: NURSE ANESTHETIST, CERTIFIED REGISTERED

## 2025-06-24 PROCEDURE — 25010000002 MEPIVACAINE HCL (PF) 1.5 % SOLUTION: Performed by: ANESTHESIOLOGY

## 2025-06-24 PROCEDURE — 27130 TOTAL HIP ARTHROPLASTY: CPT | Performed by: PHYSICIAN ASSISTANT

## 2025-06-24 PROCEDURE — 97110 THERAPEUTIC EXERCISES: CPT

## 2025-06-24 PROCEDURE — C1713 ANCHOR/SCREW BN/BN,TIS/BN: HCPCS | Performed by: ORTHOPAEDIC SURGERY

## 2025-06-24 PROCEDURE — S0260 H&P FOR SURGERY: HCPCS | Performed by: PHYSICIAN ASSISTANT

## 2025-06-24 PROCEDURE — 27130 TOTAL HIP ARTHROPLASTY: CPT | Performed by: ORTHOPAEDIC SURGERY

## 2025-06-24 PROCEDURE — 97161 PT EVAL LOW COMPLEX 20 MIN: CPT

## 2025-06-24 PROCEDURE — 25010000002 CEFAZOLIN PER 500 MG: Performed by: ORTHOPAEDIC SURGERY

## 2025-06-24 PROCEDURE — 25010000002 FENTANYL CITRATE (PF) 50 MCG/ML SOLUTION

## 2025-06-24 DEVICE — OXINIUM FEMORAL HEAD 12/14 TAPER                                    36 MM +0
Type: IMPLANTABLE DEVICE | Site: HIP | Status: FUNCTIONAL
Brand: OXINIUM

## 2025-06-24 DEVICE — R3 0 DEGREE XLPE ACETABULAR LINER                                    36MM INNER DIAMETER X OUTER DIAMETER 52MM
Type: IMPLANTABLE DEVICE | Site: HIP | Status: FUNCTIONAL
Brand: R3

## 2025-06-24 DEVICE — DEV CONTRL TISS STRATAFIX SPIRAL MNCRYL UD 3/0 PLS 60CM: Type: IMPLANTABLE DEVICE | Site: HIP | Status: FUNCTIONAL

## 2025-06-24 DEVICE — R3 3 HOLE ACETABULAR SHELL 52MM
Type: IMPLANTABLE DEVICE | Site: HIP | Status: FUNCTIONAL
Brand: R3 ACETABULAR

## 2025-06-24 DEVICE — DEV CONTRL TISS STRATAFIX SYMM PDS PLUS VIL CT-1 45CM: Type: IMPLANTABLE DEVICE | Site: HIP | Status: FUNCTIONAL

## 2025-06-24 DEVICE — REFLECTION SPHERICAL HEAD SCREW 25MM
Type: IMPLANTABLE DEVICE | Site: HIP | Status: FUNCTIONAL
Brand: REFLECTION

## 2025-06-24 DEVICE — POLARSTEM COLLAR STANDARD                                    NON-CEMENTED WITH TI/HA 0
Type: IMPLANTABLE DEVICE | Site: HIP | Status: FUNCTIONAL
Brand: POLARSTEM

## 2025-06-24 DEVICE — IMPLANTABLE DEVICE: Type: IMPLANTABLE DEVICE | Site: HIP | Status: FUNCTIONAL

## 2025-06-24 RX ORDER — ACETAMINOPHEN 500 MG
1000 TABLET ORAL EVERY 8 HOURS
Status: CANCELLED | OUTPATIENT
Start: 2025-06-24 | End: 2025-06-26

## 2025-06-24 RX ORDER — SODIUM CHLORIDE 0.9 % (FLUSH) 0.9 %
1-10 SYRINGE (ML) INJECTION AS NEEDED
Status: CANCELLED | OUTPATIENT
Start: 2025-06-24

## 2025-06-24 RX ORDER — ONDANSETRON 2 MG/ML
INJECTION INTRAMUSCULAR; INTRAVENOUS AS NEEDED
Status: DISCONTINUED | OUTPATIENT
Start: 2025-06-24 | End: 2025-06-24 | Stop reason: SURG

## 2025-06-24 RX ORDER — PREGABALIN 150 MG/1
150 CAPSULE ORAL ONCE
Status: COMPLETED | OUTPATIENT
Start: 2025-06-24 | End: 2025-06-24

## 2025-06-24 RX ORDER — HYDROMORPHONE HYDROCHLORIDE 1 MG/ML
0.5 INJECTION, SOLUTION INTRAMUSCULAR; INTRAVENOUS; SUBCUTANEOUS
Status: DISCONTINUED | OUTPATIENT
Start: 2025-06-24 | End: 2025-06-24 | Stop reason: HOSPADM

## 2025-06-24 RX ORDER — CEFAZOLIN SODIUM 2 G/100ML
2 INJECTION, SOLUTION INTRAVENOUS EVERY 8 HOURS
Status: CANCELLED | OUTPATIENT
Start: 2025-06-24 | End: 2025-06-25

## 2025-06-24 RX ORDER — TRANEXAMIC ACID 10 MG/ML
1000 INJECTION, SOLUTION INTRAVENOUS ONCE
Status: COMPLETED | OUTPATIENT
Start: 2025-06-24 | End: 2025-06-24

## 2025-06-24 RX ORDER — SODIUM CHLORIDE, SODIUM LACTATE, POTASSIUM CHLORIDE, CALCIUM CHLORIDE 600; 310; 30; 20 MG/100ML; MG/100ML; MG/100ML; MG/100ML
9 INJECTION, SOLUTION INTRAVENOUS CONTINUOUS
Status: DISCONTINUED | OUTPATIENT
Start: 2025-06-25 | End: 2025-06-24 | Stop reason: HOSPADM

## 2025-06-24 RX ORDER — ACETAMINOPHEN 500 MG
1000 TABLET ORAL EVERY 8 HOURS
Qty: 40 TABLET | Refills: 0 | Status: SHIPPED | OUTPATIENT
Start: 2025-06-24 | End: 2025-07-01

## 2025-06-24 RX ORDER — SODIUM CHLORIDE 0.9 % (FLUSH) 0.9 %
10 SYRINGE (ML) INJECTION AS NEEDED
Status: DISCONTINUED | OUTPATIENT
Start: 2025-06-24 | End: 2025-06-24 | Stop reason: HOSPADM

## 2025-06-24 RX ORDER — BUPIVACAINE HCL/0.9 % NACL/PF 0.125 %
PLASTIC BAG, INJECTION (ML) EPIDURAL AS NEEDED
Status: DISCONTINUED | OUTPATIENT
Start: 2025-06-24 | End: 2025-06-24 | Stop reason: SURG

## 2025-06-24 RX ORDER — SODIUM CHLORIDE 0.9 % (FLUSH) 0.9 %
10 SYRINGE (ML) INJECTION EVERY 12 HOURS SCHEDULED
Status: CANCELLED | OUTPATIENT
Start: 2025-06-24

## 2025-06-24 RX ORDER — FENTANYL CITRATE 50 UG/ML
INJECTION, SOLUTION INTRAMUSCULAR; INTRAVENOUS
Status: COMPLETED
Start: 2025-06-24 | End: 2025-06-24

## 2025-06-24 RX ORDER — SODIUM CHLORIDE 9 MG/ML
40 INJECTION, SOLUTION INTRAVENOUS AS NEEDED
Status: DISCONTINUED | OUTPATIENT
Start: 2025-06-24 | End: 2025-06-24 | Stop reason: HOSPADM

## 2025-06-24 RX ORDER — NALOXONE HCL 0.4 MG/ML
0.4 VIAL (ML) INJECTION
Status: CANCELLED | OUTPATIENT
Start: 2025-06-24

## 2025-06-24 RX ORDER — FENTANYL CITRATE 50 UG/ML
50 INJECTION, SOLUTION INTRAMUSCULAR; INTRAVENOUS
Status: DISCONTINUED | OUTPATIENT
Start: 2025-06-24 | End: 2025-06-24 | Stop reason: HOSPADM

## 2025-06-24 RX ORDER — DOCUSATE SODIUM 100 MG/1
100 CAPSULE, LIQUID FILLED ORAL 2 TIMES DAILY
Qty: 30 CAPSULE | Refills: 0 | Status: SHIPPED | OUTPATIENT
Start: 2025-06-24 | End: 2025-07-09

## 2025-06-24 RX ORDER — MELOXICAM 15 MG/1
15 TABLET ORAL DAILY
Status: CANCELLED | OUTPATIENT
Start: 2025-06-24

## 2025-06-24 RX ORDER — PROPOFOL 10 MG/ML
VIAL (ML) INTRAVENOUS AS NEEDED
Status: DISCONTINUED | OUTPATIENT
Start: 2025-06-24 | End: 2025-06-24 | Stop reason: SURG

## 2025-06-24 RX ORDER — MELOXICAM 15 MG/1
15 TABLET ORAL DAILY
Qty: 15 TABLET | Refills: 0 | Status: SHIPPED | OUTPATIENT
Start: 2025-06-25 | End: 2025-07-10

## 2025-06-24 RX ORDER — OXYCODONE HYDROCHLORIDE 5 MG/1
5 TABLET ORAL EVERY 4 HOURS PRN
Refills: 0 | Status: CANCELLED | OUTPATIENT
Start: 2025-06-24 | End: 2025-07-04

## 2025-06-24 RX ORDER — NALOXONE HCL 0.4 MG/ML
0.1 VIAL (ML) INJECTION
Status: CANCELLED | OUTPATIENT
Start: 2025-06-24

## 2025-06-24 RX ORDER — ONDANSETRON 2 MG/ML
4 INJECTION INTRAMUSCULAR; INTRAVENOUS ONCE AS NEEDED
Status: DISCONTINUED | OUTPATIENT
Start: 2025-06-24 | End: 2025-06-24 | Stop reason: HOSPADM

## 2025-06-24 RX ORDER — ONDANSETRON 4 MG/1
4 TABLET, ORALLY DISINTEGRATING ORAL EVERY 6 HOURS PRN
Status: CANCELLED | OUTPATIENT
Start: 2025-06-24

## 2025-06-24 RX ORDER — ASPIRIN 81 MG/1
81 TABLET ORAL EVERY 12 HOURS SCHEDULED
Status: CANCELLED | OUTPATIENT
Start: 2025-06-25

## 2025-06-24 RX ORDER — DEXAMETHASONE SODIUM PHOSPHATE 4 MG/ML
INJECTION, SOLUTION INTRA-ARTICULAR; INTRALESIONAL; INTRAMUSCULAR; INTRAVENOUS; SOFT TISSUE AS NEEDED
Status: DISCONTINUED | OUTPATIENT
Start: 2025-06-24 | End: 2025-06-24 | Stop reason: SURG

## 2025-06-24 RX ORDER — ROPIVACAINE HYDROCHLORIDE 5 MG/ML
INJECTION, SOLUTION EPIDURAL; INFILTRATION; PERINEURAL AS NEEDED
Status: DISCONTINUED | OUTPATIENT
Start: 2025-06-24 | End: 2025-06-24 | Stop reason: HOSPADM

## 2025-06-24 RX ORDER — OXYCODONE HYDROCHLORIDE 5 MG/1
5 TABLET ORAL EVERY 4 HOURS PRN
Qty: 30 TABLET | Refills: 0 | Status: SHIPPED | OUTPATIENT
Start: 2025-06-24

## 2025-06-24 RX ORDER — SODIUM CHLORIDE 0.9 % (FLUSH) 0.9 %
10 SYRINGE (ML) INJECTION EVERY 12 HOURS SCHEDULED
Status: DISCONTINUED | OUTPATIENT
Start: 2025-06-24 | End: 2025-06-24 | Stop reason: HOSPADM

## 2025-06-24 RX ORDER — SODIUM CHLORIDE 9 MG/ML
INJECTION, SOLUTION INTRAVENOUS
Status: DISCONTINUED
Start: 2025-06-24 | End: 2025-06-24 | Stop reason: HOSPADM

## 2025-06-24 RX ORDER — CEFAZOLIN SODIUM 1 G/3ML
INJECTION, POWDER, FOR SOLUTION INTRAMUSCULAR; INTRAVENOUS
Status: DISCONTINUED
Start: 2025-06-24 | End: 2025-06-24 | Stop reason: HOSPADM

## 2025-06-24 RX ORDER — MORPHINE SULFATE 4 MG/ML
4 INJECTION, SOLUTION INTRAMUSCULAR; INTRAVENOUS
Status: CANCELLED | OUTPATIENT
Start: 2025-06-24 | End: 2025-07-04

## 2025-06-24 RX ORDER — FAMOTIDINE 20 MG/1
20 TABLET, FILM COATED ORAL ONCE
Status: COMPLETED | OUTPATIENT
Start: 2025-06-24 | End: 2025-06-24

## 2025-06-24 RX ORDER — MAGNESIUM HYDROXIDE 1200 MG/15ML
LIQUID ORAL AS NEEDED
Status: DISCONTINUED | OUTPATIENT
Start: 2025-06-24 | End: 2025-06-24 | Stop reason: HOSPADM

## 2025-06-24 RX ORDER — ACETAMINOPHEN 500 MG
1000 TABLET ORAL ONCE
Status: COMPLETED | OUTPATIENT
Start: 2025-06-24 | End: 2025-06-24

## 2025-06-24 RX ORDER — ASPIRIN 81 MG/1
81 TABLET ORAL 2 TIMES DAILY
Qty: 60 TABLET | Refills: 0 | Status: SHIPPED | OUTPATIENT
Start: 2025-06-25

## 2025-06-24 RX ORDER — LIDOCAINE HYDROCHLORIDE 10 MG/ML
0.5 INJECTION, SOLUTION EPIDURAL; INFILTRATION; INTRACAUDAL; PERINEURAL ONCE AS NEEDED
Status: COMPLETED | OUTPATIENT
Start: 2025-06-24 | End: 2025-06-24

## 2025-06-24 RX ORDER — LIDOCAINE HYDROCHLORIDE 10 MG/ML
INJECTION, SOLUTION EPIDURAL; INFILTRATION; INTRACAUDAL; PERINEURAL AS NEEDED
Status: DISCONTINUED | OUTPATIENT
Start: 2025-06-24 | End: 2025-06-24 | Stop reason: SURG

## 2025-06-24 RX ORDER — EPHEDRINE SULFATE 50 MG/ML
INJECTION INTRAVENOUS AS NEEDED
Status: DISCONTINUED | OUTPATIENT
Start: 2025-06-24 | End: 2025-06-24 | Stop reason: SURG

## 2025-06-24 RX ORDER — SODIUM CHLORIDE 9 MG/ML
120 INJECTION, SOLUTION INTRAVENOUS CONTINUOUS
Status: CANCELLED | OUTPATIENT
Start: 2025-06-24 | End: 2025-06-25

## 2025-06-24 RX ORDER — ONDANSETRON 2 MG/ML
4 INJECTION INTRAMUSCULAR; INTRAVENOUS EVERY 6 HOURS PRN
Status: CANCELLED | OUTPATIENT
Start: 2025-06-24

## 2025-06-24 RX ORDER — MIDAZOLAM HYDROCHLORIDE 1 MG/ML
1 INJECTION, SOLUTION INTRAMUSCULAR; INTRAVENOUS
Status: DISCONTINUED | OUTPATIENT
Start: 2025-06-24 | End: 2025-06-24 | Stop reason: HOSPADM

## 2025-06-24 RX ADMIN — MEPIVACAINE HYDROCHLORIDE 4 ML: 15 INJECTION, SOLUTION EPIDURAL; INFILTRATION at 07:33

## 2025-06-24 RX ADMIN — TRANEXAMIC ACID 1000 MG: 10 INJECTION, SOLUTION INTRAVENOUS at 09:06

## 2025-06-24 RX ADMIN — CEFAZOLIN 1000 MG: 1 INJECTION, POWDER, FOR SOLUTION INTRAMUSCULAR; INTRAVENOUS at 10:29

## 2025-06-24 RX ADMIN — Medication 50 MCG: at 08:10

## 2025-06-24 RX ADMIN — PHENYLEPHRINE HYDROCHLORIDE 0.2 MCG/KG/MIN: 10 INJECTION INTRAVENOUS at 08:18

## 2025-06-24 RX ADMIN — TRANEXAMIC ACID 1000 MG: 10 INJECTION, SOLUTION INTRAVENOUS at 07:45

## 2025-06-24 RX ADMIN — ACETAMINOPHEN 1000 MG: 500 TABLET ORAL at 06:17

## 2025-06-24 RX ADMIN — LIDOCAINE HYDROCHLORIDE 50 MG: 10 INJECTION, SOLUTION EPIDURAL; INFILTRATION; INTRACAUDAL; PERINEURAL at 07:30

## 2025-06-24 RX ADMIN — ONDANSETRON 4 MG: 2 INJECTION INTRAMUSCULAR; INTRAVENOUS at 09:06

## 2025-06-24 RX ADMIN — EPHEDRINE SULFATE 5 MG: 50 INJECTION INTRAVENOUS at 08:57

## 2025-06-24 RX ADMIN — PROPOFOL 50 MG: 10 INJECTION, EMULSION INTRAVENOUS at 07:30

## 2025-06-24 RX ADMIN — PROPOFOL 100 MCG/KG/MIN: 10 INJECTION, EMULSION INTRAVENOUS at 07:35

## 2025-06-24 RX ADMIN — DEXAMETHASONE SODIUM PHOSPHATE 8 MG: 4 INJECTION, SOLUTION INTRA-ARTICULAR; INTRALESIONAL; INTRAMUSCULAR; INTRAVENOUS; SOFT TISSUE at 07:30

## 2025-06-24 RX ADMIN — PREGABALIN 150 MG: 150 CAPSULE ORAL at 06:17

## 2025-06-24 RX ADMIN — SODIUM CHLORIDE, POTASSIUM CHLORIDE, SODIUM LACTATE AND CALCIUM CHLORIDE 9 ML/HR: 600; 310; 30; 20 INJECTION, SOLUTION INTRAVENOUS at 06:28

## 2025-06-24 RX ADMIN — LIDOCAINE HYDROCHLORIDE 0.5 ML: 10 INJECTION, SOLUTION EPIDURAL; INFILTRATION; INTRACAUDAL; PERINEURAL at 06:17

## 2025-06-24 RX ADMIN — FENTANYL CITRATE 50 MCG: 50 INJECTION, SOLUTION INTRAMUSCULAR; INTRAVENOUS at 12:36

## 2025-06-24 RX ADMIN — EPHEDRINE SULFATE 5 MG: 50 INJECTION INTRAVENOUS at 08:10

## 2025-06-24 RX ADMIN — SODIUM CHLORIDE 2 G: 900 INJECTION INTRAVENOUS at 07:38

## 2025-06-24 RX ADMIN — FAMOTIDINE 20 MG: 20 TABLET, FILM COATED ORAL at 06:17

## 2025-06-24 RX ADMIN — Medication 100 MCG: at 08:00

## 2025-06-24 RX ADMIN — EPHEDRINE SULFATE 10 MG: 50 INJECTION INTRAVENOUS at 07:47

## 2025-06-24 NOTE — H&P
"Pre-Op H&P  Kimberly Brito  [id number]  1960    Chief complaint: \"It is my right hip\"    HPI:    Patient is a 64 y.o.female who presents with progressive pain in the right hip.  She has failed conservative therapy.  This includes tincture of time, nonsteroidal anti-inflammatories, physical therapy, hip injection.  Hip injection helped but only for a short period of time.  After failing conservative therapy she is now admitted for total hip replacement.    Review of Systems:  General ROS: negative for chills, fever or skin lesions;  No changes since last office visit.  Neg for recent sick exposure  Cardiovascular ROS: no chest pain or dyspnea on exertion  Respiratory ROS: no cough, shortness of breath, or wheezing    Allergies: No Known Allergies    Home Meds:    No current facility-administered medications on file prior to encounter.     Current Outpatient Medications on File Prior to Encounter   Medication Sig Dispense Refill    calcium carbonate (OS-JAKE) 600 MG tablet Take 1 tablet by mouth Daily.      Chlorhexidine Gluconate 4 % solution Apply 1 Application topically to the appropriate area as directed Daily. Shower with solution as directed for 5 days prior to surgery 236 mL 0    cholecalciferol (VITAMIN D3) 1000 units tablet Take 1 tablet by mouth Every Other Day.      EXTRA STRENGTH ACETAMINOPHEN PO Take  by mouth As Needed.      Multiple Vitamins-Minerals (MULTIVITAMIN ADULT PO) Take 1 tablet by mouth Daily.         PMH:   Past Medical History:   Diagnosis Date    Degenerative arthritis of hip 04/07/2025    Melanoma of hip 1998    Osteoporosis 2014    Rotator cuff tendinitis of right shoulder      PSH:    Past Surgical History:   Procedure Laterality Date    COLONOSCOPY      MAMMO STEREOTACTIC BREAST BIOPSY 1ST W WO DEVICE Left 10/14/2008     bx    SKIN CANCER EXCISION Left 1998    Melanoma of left hip    TOOTH EXTRACTION Left 01/14/2025    Left Back     Patient denies allergy to contrast dye or " "latex  Immunization History:  Influenza: No  Pneumococcal: No  Tetanus: Up-to-date    Social History:   Tobacco:   Social History     Tobacco Use   Smoking Status Never    Passive exposure: Never   Smokeless Tobacco Never   Tobacco Comments    Parents smoked in the home      Alcohol:     Social History     Substance and Sexual Activity   Alcohol Use Yes    Alcohol/week: 11.0 standard drinks of alcohol    Types: 7 Glasses of wine, 4 Shots of liquor per week       Vitals:           /82 (BP Location: Right arm, Patient Position: Lying)   Pulse 81   Temp 98 °F (36.7 °C) (Temporal)   Resp 16   Ht 157.5 cm (62\")   Wt 49.9 kg (110 lb)   LMP  (LMP Unknown)   SpO2 100%   BMI 20.12 kg/m²     Physical Exam:  General Appearance:    Alert, cooperative, no distress, appears stated age   Head:    Normocephalic, without obvious abnormality, atraumatic   Lungs:   Clear to auscultation bilaterally to the bases    Heart: S1-S2 without rubs murmurs or gallops    Abdomen:  Soft, nontender, bowel sounds present throughout.   Breast Exam:    deferred   Genitalia:    deferred   Extremities:   Extremities normal, atraumatic, no cyanosis or edema   Skin:   Skin color, texture, turgor normal, no rashes or lesions   Neurologic:   Grossly intact   Results Review  LABS:  Lab Results   Component Value Date    WBC 5.54 06/10/2025    HGB 14.0 06/10/2025    HCT 41.7 06/10/2025    MCV 90.8 06/10/2025     06/10/2025    NEUTROABS 4.09 06/10/2025    GLUCOSE 93 06/10/2025    BUN 10.1 06/10/2025    CREATININE 0.87 06/10/2025    EGFRIFNONA 71 11/09/2021     06/10/2025    K 3.9 06/10/2025     06/10/2025    CO2 27.0 06/10/2025    CALCIUM 9.1 06/10/2025    ALBUMIN 4.1 01/08/2025    AST 25 01/08/2025    ALT 19 01/08/2025    BILITOT 0.6 01/08/2025    PTT 26.1 06/10/2025    INR 0.91 06/10/2025       RADIOLOGY:  No radiology results for the last 3 days     I reviewed the patient's new clinical results.    Cancer Staging (if " applicable)  Cancer Patient: __ yes __no __unknown; If yes, clinical stage T:__ N:__M:__, stage group or __N/A    Impression: Primary osteoarthritis right hip  Degenerative joint disease      Plan: Right total hip arthroplasty anterior modified, right      JENSEN Crain   06/24/25   6:44 AM EDT    Agree with above - plan for right ANTIONE    Familia Yao MD  06/24/25  07:24 EDT

## 2025-06-24 NOTE — ANESTHESIA PROCEDURE NOTES
Spinal Block      Patient reassessed immediately prior to procedure    Patient location during procedure: OR  Performed ByVERN: Roxie Friedman SRNA  Spinal Block Prep:  Patient Position:sitting  Sterile Tech:cap, gloves, mask and sterile barriers  Prep:Chloraprep  Patient Monitoring:blood pressure monitoring, continuous pulse oximetry and EKG    Spinal Block Procedure  Approach:midline  Guidance:landmark technique and palpation technique  Location:L3-L4  Needle Type:Quincke  Needle Gauge:25 G  Placement of Spinal needle event:cerebrospinal fluid aspirated  Paresthesia: no  Fluid Appearance:clear  Medications: Mepivacaine HCl (PF) (CARBOCAINE) 1.5 % injection - Injection   4 mL - 6/24/2025 7:33:00 AM   Post Assessment  Patient Tolerance:patient tolerated the procedure well with no apparent complications  Complications no

## 2025-06-24 NOTE — H&P
Patient Name: Kimberly Brito  MRN: [id number]  : 1960  DOS: 2025    Attending: Familia Yao MD    Primary Care Provider: Jules Wright MD      Chief complaint: Right hip pain    Subjective   Patient is a pleasant 64 y.o. female presented for scheduled surgery by Dr. Yao    Per his note ( The patient is a 64 y.o. female with a history of debilitating right hip pain secondary to osteoarthritis, that failed to improve in spite of conservative treatment. The patient opted for a right total hip arthroplasty at this time and consented for the procedure. Please see my office notes for details with regard to preoperative counseling and operative rationale. )     Patient underwent right total hip arthroplasty under spinal anesthesia, tolerated surgery well, was admitted for further management.    Seen postoperatively, doing well, good pain control, no complaints of nausea, vomiting, or shortness of breath.    Patient has no of DVT and/or PE.    Reviewed with patient past medical history and home medications.      Allergies:  No Known Allergies    Meds:  Medications Prior to Admission   Medication Sig Dispense Refill Last Dose/Taking    calcium carbonate (OS-JAKE) 600 MG tablet Take 1 tablet by mouth Daily.   Past Week    Chlorhexidine Gluconate 4 % solution Apply 1 Application topically to the appropriate area as directed Daily. Shower with solution as directed for 5 days prior to surgery 236 mL 0 2025    cholecalciferol (VITAMIN D3) 1000 units tablet Take 1 tablet by mouth Every Other Day.   Past Week    EXTRA STRENGTH ACETAMINOPHEN PO Take  by mouth As Needed.   Past Month    Multiple Vitamins-Minerals (MULTIVITAMIN ADULT PO) Take 1 tablet by mouth Daily.   Past Week    TURMERIC CURCUMIN PO Take 1 capsule by mouth Daily.   Past Month         History:   Past Medical History:   Diagnosis Date    Degenerative arthritis of hip 2025    Melanoma of hip     Osteoporosis     Rotator cuff  "tendinitis of right shoulder      Past Surgical History:   Procedure Laterality Date    COLONOSCOPY      MAMMO STEREOTACTIC BREAST BIOPSY 1ST W WO DEVICE Left 10/14/2008    US bx    SKIN CANCER EXCISION Left 1998    Melanoma of left hip    TOOTH EXTRACTION Left 01/14/2025    Left Back     Family History   Problem Relation Age of Onset    Diabetes Father     Lung cancer Father     Arthritis Father     Breast cancer Mother 75    Colon cancer Mother 65    Osteoporosis Mother     Heart disease Brother     Diabetes Brother     Cancer Brother         Lymphocitic leukemia    No Known Problems Brother     No Known Problems Sister     No Known Problems Sister     No Known Problems Son     Endometriosis Daughter     Stroke Maternal Grandmother     Heart disease Maternal Grandfather     Ovarian cancer Maternal Aunt         age unknown    Kidney disease Maternal Aunt     Kidney disease Maternal Uncle      Social History     Tobacco Use    Smoking status: Never     Passive exposure: Never    Smokeless tobacco: Never    Tobacco comments:     Parents smoked in the home   Vaping Use    Vaping status: Never Used   Substance Use Topics    Alcohol use: Yes     Alcohol/week: 11.0 standard drinks of alcohol     Types: 7 Glasses of wine, 4 Shots of liquor per week    Drug use: No       Review of Systems  Pertinent items are noted in HPI    Vital Signs  /58 (Patient Position: Lying)   Pulse 75   Temp 97.3 °F (36.3 °C)   Resp 18   Ht 157.5 cm (62\")   Wt 49.9 kg (110 lb)   LMP  (LMP Unknown)   SpO2 100%   BMI 20.12 kg/m²     Physical Exam:    General Appearance:    Alert, cooperative, in no acute distress   Head:    Normocephalic, without obvious abnormality, atraumatic   Eyes:            Lids and lashes normal, conjunctivae and sclerae normal, no   icterus, no pallor, corneas clear    Ears:    Ears appear intact with no abnormalities noted   Throat:   No oral lesions, no thrush, oral mucosa moist   Neck:   No adenopathy, " "supple, trachea midline, no thyromegaly         Lungs:     Clear to auscultation,respirations regular, even and   unlabored. No wheezes or rales.    Heart:    Regular rhythm and normal rate, normal S1 and S2, no murmur, no gallop   Abdomen:     Normal bowel sounds, no masses, no organomegaly, soft        non-tender, non-distended, no guarding, no rebound                 tenderness   Genitalia:    Deferred   Extremities: Right LE, CDI dressing over right hip.  No clubbing, cyanosis, or edema   Pulses:   Pulses palpable and equal bilaterally   Skin:   No bleeding, bruising or rash   Neurologic:   Cranial nerves 2 - 12 grossly intact, decreased movement and sensation of the legs under the effects of spinal anesthesia when seen postop      I reviewed the patient's new clinical results.             Invalid input(s): \"NEUTOPHILPCT\"        Invalid input(s): \"LABALBU\", \"PROT\"  Lab Results   Component Value Date    HGBA1C 5.10 06/10/2025      Latest Reference Range & Units 06/10/25 13:43   Sodium 136 - 145 mmol/L 138   Potassium 3.5 - 5.2 mmol/L 3.9   Chloride 98 - 107 mmol/L 101   CO2 22.0 - 29.0 mmol/L 27.0   Anion Gap 5.0 - 15.0 mmol/L 10.0   BUN 8.0 - 23.0 mg/dL 10.1   Creatinine 0.57 - 1.00 mg/dL 0.87   BUN/Creatinine Ratio 7.0 - 25.0  11.6   eGFR >60.0 mL/min/1.73 75.0   Glucose 65 - 99 mg/dL 93   Calcium 8.6 - 10.5 mg/dL 9.1      Latest Reference Range & Units 06/10/25 13:43   WBC 3.40 - 10.80 10*3/mm3 5.54   RBC 3.77 - 5.28 10*6/mm3 4.59   Hemoglobin 12.0 - 15.9 g/dL 14.0   Hematocrit 34.0 - 46.6 % 41.7   Platelets 140 - 450 10*3/mm3 199   RDW 12.3 - 15.4 % 12.7   MCV 79.0 - 97.0 fL 90.8   MCH 26.6 - 33.0 pg 30.5   MCHC 31.5 - 35.7 g/dL 33.6   MPV 6.0 - 12.0 fL 10.2       Assessment and Plan:       Status post right hip replacement    Mixed hyperlipidemia    Primary osteoarthritis of right hip    Degenerative arthritis of hip      Plan:    1. PT/OT,  weight bearing as tolerated right LE.  Total hip precautions for 6 " weeks  2. Pain control-prns  3. IS-encourage  4. DVT proph- Mechanicals and aspirin  5. Bowel regimen  6. Resume home medications as appropriate  7.   DC planning for home    Patient is very motivated to work with physical therapy and achieve  mobility and pain control among other goals for possible discharge home later in the day.    We reviewed these goals and discussed with patient tracking  progress for the next few hours and if all is achieved to receive next antibiotic prophylactic dose and be discharged home.     We discussed medications and precriptions at time of discharge including DVT prophylaxis, pain control, and bowel regimen.  All questions were answered .    Patient expressed understanding and agreement.     Dragon disclaimer:  Part of this encounter note is an electronic transcription/translation of spoken language to printed text. The electronic translation of spoken language may permit erroneous, or at times, nonsensical words or phrases to be inadvertently transcribed; Although I have reviewed the note for such errors, some may still exist.    Charissa Conrad MD  06/24/25  10:04 EDT

## 2025-06-24 NOTE — THERAPY EVALUATION
Patient Name: Kimberly Brito  : 1960    MRN: [id number]                              Today's Date: 2025       Admit Date: 2025    Visit Dx:     ICD-10-CM ICD-9-CM   1. Status post right hip replacement  Z96.641 V43.64   2. Primary osteoarthritis of right hip  M16.11 715.15     Patient Active Problem List   Diagnosis    Osteoporosis    Family history of ovarian cancer    Family history of breast cancer in mother    Annual physical exam    Thyroid nodule    Annual GYN exam in [place]    Family history of colon cancer    Eczema    Mixed hyperlipidemia    Right hip pain    Primary osteoarthritis of right hip    Degenerative arthritis of hip    Status post right hip replacement     Past Medical History:   Diagnosis Date    Degenerative arthritis of hip 2025    Melanoma of hip     Osteoporosis     Rotator cuff tendinitis of right shoulder      Past Surgical History:   Procedure Laterality Date    COLONOSCOPY      MAMMO STEREOTACTIC BREAST BIOPSY 1ST W WO DEVICE Left 10/14/2008     bx    SKIN CANCER EXCISION Left     Melanoma of left hip    TOOTH EXTRACTION Left 2025    Left Back      General Information       Row Name 25 1424          Physical Therapy Time and Intention    Document Type evaluation  -CK     Mode of Treatment physical therapy;individual therapy  -CK       Row Name 25 1424          General Information    Patient Profile Reviewed yes  -CK     Prior Level of Function independent:;all household mobility;ADL's  typically ind no AD, lifestyle limited by hip pain  -CK     Existing Precautions/Restrictions fall;right;hip, anterior;other (see comments);no known precautions/restrictions  RLE WBAT  -CK     Barriers to Rehab none identified  -CK       Row Name 25 1424          Living Environment    Current Living Arrangements home  -CK     People in Home spouse  -CK       Row Name 25 1424          Home Main Entrance    Number of Stairs, Main Entrance two  -CK  "    Stair Railings, Main Entrance railing on right side (ascending)  -CK       Row Name 06/24/25 1424          Stairs Within Home, Primary    Stairs, Within Home, Primary able to stay on main floor  -CK       Row Name 06/24/25 1424          Cognition    Orientation Status (Cognition) oriented x 4  -CK       Row Name 06/24/25 1424          Safety Issues/Impairments Affecting Functional Mobility    Safety Issues Affecting Function (Mobility) insight into deficits/self-awareness;safety precaution awareness  -CK     Impairments Affecting Function (Mobility) balance;endurance/activity tolerance;pain;range of motion (ROM);strength  -CK               User Key  (r) = Recorded By, (t) = Taken By, (c) = Cosigned By      Initials Name Provider Type    CK Dorene Hernandez, PT Physical Therapist                   Mobility       Row Name 06/24/25 1313          Bed Mobility    Bed Mobility supine-sit;sit-supine  -CK     Supine-Sit [place] (Bed Mobility) contact guard;supervision  -CK     Sit-Supine [place] (Bed Mobility) minimum assist (75% patient effort);verbal cues;1 person assist  -CK     Assistive Device (Bed Mobility) bed rails;head of bed elevated  -CK     Comment, (Bed Mobility) patient able to manage RLE out of bed, Omar at RLE for sit to sup. Patient reports she owns a leg  for use at home. She did report mildly \"fuzzy\" head at EOB and her BP was 118/68. Reports she just received IV pain medication  -       Row Name 06/24/25 1313          Sit-Stand Transfer    Sit-Stand [place] (Transfers) contact guard;2 person assist;verbal cues  -CK     Assistive Device (Sit-Stand Transfers) walker, front-wheeled  -CK     Comment, (Sit-Stand Transfer) cues to push up from bed  -CK       Row Name 06/24/25 1313          Gait/Stairs (Locomotion)    [place] Level (Gait) contact guard;2 person assist;verbal cues  -CK     Assistive Device (Gait) walker, front-wheeled  -CK     Patient was able to Ambulate yes  " -CK     Distance in Feet (Gait) 110  -CK     Deviations/Abnormal Patterns (Gait) bilateral deviations;gait speed decreased  -CK     Bilateral Gait Deviations forward flexed posture  -CK     Handrail Location (Stairs) right side (ascending);right side (descending);other (see comments)  ING[place]ID [place]ELA  -CK     Number of Steps (Stairs) 1  -CK     Ascending Technique (Stairs) step-to-step  -CK     Descending Technique (Stairs) step-to-step  -CK     Comment, (Gait/Stairs) Patient ambulated in aguilar with step through gait pattern. Cues provided for upright posture with forward gaze and continuous progression of walker. Patient demos good sequencing with AD and good gait mechanics. She was able to navigate a single step with R handrail and INGRID VELA per her home setup with cues for sequencing. No LOB or knee buckling with any mobility.  -CK       Row Name 06/24/25 1313          Mobility    Extremity Weight-bearing Status right lower extremity  -CK     Right Lower Extremity (Weight-bearing Status) weight-bearing as tolerated (WBAT)  -CK               User Key  (r) = Recorded By, (t) = Taken By, (c) = Cosigned By      Initials Name Provider Type    CK Dorene Hernandez, PT Physical Therapist                   Obj/Interventions       Row Name 06/24/25 1427          Range of Motion Comprehensive    General Range of Motion bilateral lower extremity ROM WFL  -CK     Comment, General Range of Motion R hip limited by pain, but WFL  -CK       Row Name 06/24/25 1427          Strength Comprehensive (MMT)    General Manual Muscle Testing (MMT) Assessment lower extremity strength deficits identified  -CK     Comment, General Manual Muscle Testing (MMT) Assessment LLE grossly 4+/5, RLE able to perform strong quad set/LAQ, ankle DF/PF intact  -CK       Row Name 06/24/25 1427          Motor Skills    Therapeutic Exercise hip;knee;ankle  -CK       Row Name 06/24/25 1427          Hip (Therapeutic Exercise)    Hip (Therapeutic Exercise) isometric  exercises;strengthening exercise  -CK     Hip Isometrics (Therapeutic Exercise) bilateral;gluteal sets;3 second hold;3 repetitions  -CK     Hip Strengthening (Therapeutic Exercise) aBduction;heel slides;right;3 repetitions  -CK       Row Name 06/24/25 1427          Knee (Therapeutic Exercise)    Knee (Therapeutic Exercise) isometric exercises;strengthening exercise  -CK     Knee Isometrics (Therapeutic Exercise) quad sets;3 second hold;right;3 repetitions  -CK     Knee Strengthening (Therapeutic Exercise) LAQ (long arc quad);right;3 repetitions  -CK       Row Name 06/24/25 1427          Ankle (Therapeutic Exercise)    Ankle (Therapeutic Exercise) AROM (active range of motion)  -CK     Ankle AROM (Therapeutic Exercise) bilateral;dorsiflexion;plantarflexion;10 repetitions  -CK       Row Name 06/24/25 1427          Balance    Balance Assessment sitting static balance;standing static balance;standing dynamic balance  -CK     Static Sitting Balance contact guard  -CK     Position, Sitting Balance unsupported;sitting edge of bed  -CK     Static Standing Balance contact guard  -CK     Dynamic Standing Balance contact guard;2-person assist  -CK     Position/Device Used, Standing Balance supported;walker, front-wheeled  -CK     Comment, Balance no LOB or buckling  -CK       Row Name 06/24/25 1427          Sensory Assessment (Somatosensory)    Sensory Assessment (Somatosensory) LE sensation intact  -CK               User Key  (r) = Recorded By, (t) = Taken By, (c) = Cosigned By      Initials Name Provider Type    CK Dorene Hernandez, PT Physical Therapist                   Goals/Plan       Row Name 06/24/25 1433          Bed Mobility Goal 1 (PT)    Activity/Assistive Device (Bed Mobility Goal 1, PT) sit to supine/supine to sit  -CK     [place] Level/Cues Needed (Bed Mobility Goal 1, PT) independent  -CK     Time Frame (Bed Mobility Goal 1, PT) short term goal (STG);3 days  -CK     Progress/Outcomes (Bed Mobility Goal  1, PT) new goal  -CK       Row Name 06/24/25 1433          Transfer Goal 1 (PT)    Activity/Assistive Device (Transfer Goal 1, PT) sit-to-stand/stand-to-sit;bed-to-chair/chair-to-bed  -CK     [place] Level/Cues Needed (Transfer Goal 1, PT) modified independence  -CK     Time Frame (Transfer Goal 1, PT) long term goal (LTG);3 days  -CK     Progress/Outcome (Transfer Goal 1, PT) new goal  -CK       Row Name 06/24/25 1433          Gait Training Goal 1 (PT)    Activity/Assistive Device (Gait Training Goal 1, PT) gait (walking locomotion);assistive device use  -CK     [place] Level (Gait Training Goal 1, PT) standby assist  -CK     Distance (Gait Training Goal 1, PT) 500'  -CK     Time Frame (Gait Training Goal 1, PT) long term goal (LTG);3 days  -CK     Progress/Outcome (Gait Training Goal 1, PT) new goal  -CK       Row Name 06/24/25 1433          Stairs Goal 1 (PT)    Activity/Assistive Device (Stairs Goal 1, PT) ascending stairs;descending stairs;using handrail, right  -CK     [place] Level/Cues Needed (Stairs Goal 1, PT) contact guard required  -CK     Number of Stairs (Stairs Goal 1, PT) 2  -CK     Time Frame (Stairs Goal 1, PT) long term goal (LTG);3 days  -CK     Progress/Outcome (Stairs Goal 1, PT) new goal  -CK       Row Name 06/24/25 1433          Therapy Assessment/Plan (PT)    Planned Therapy Interventions (PT) balance training;bed mobility training;gait training;home exercise program;neuromuscular re-education;patient/family education;postural re-education;ROM (range of motion);stair training;strengthening;stretching;transfer training  -CK               User Key  (r) = Recorded By, (t) = Taken By, (c) = Cosigned By      Initials Name Provider Type    CK Dorene Hernandez, PT Physical Therapist                   Clinical Impression       Row Name 06/24/25 1429          Pain    Pretreatment Pain Rating 5/10  -CK     Posttreatment Pain Rating 5/10  -CK     Pain Location hip  -CK     Pain  Side/Orientation right  -CK     Pain Management Interventions exercise or physical activity utilized;positioning techniques utilized;premedicated for activity;nursing notified  -CK     Response to Pain Interventions activity participation with tolerable pain  -CK       Row Name 06/24/25 1429          Plan of Care Review    Plan of Care Reviewed With patient  -CK     Outcome Evaluation Patient presents with deficits in strength, endurance, and balance. She was able to ambualte 110' CGAx2 with FWW and navigate 1 step per her home setup with CGA. She had no LOB or buckling with mobility. HEP reviewed with patient and her spouse. IPPT is indicated to address deficits while admitted. She is cleared to D/C home today with family assist and OPPT if medically appropriate. She owns a FWW.  -CK       Row Name 06/24/25 1429          Therapy Assessment/Plan (PT)    Patient/Family Therapy Goals Statement (PT) be able to walk long distances and carry her grandchildren  -CK     Rehab Potential (PT) good  -CK     Criteria for Skilled Interventions Met (PT) yes;meets criteria;skilled treatment is necessary  -CK     Therapy Frequency (PT) 2 times/day  -CK     Predicted Duration of Therapy Intervention (PT) 2 days  -CK       Row Name 06/24/25 1429          Vital Signs    Pre Systolic BP Rehab 113  -CK     Pre Treatment Diastolic BP 63  -CK     Intra Systolic BP Rehab 118  -CK     Intra Treatment Diastolic BP 68  -CK     Post Systolic BP Rehab 123  -CK     Post Treatment Diastolic BP 76  -CK     Pretreatment Heart Rate (beats/min) 108  -CK     Intratreatment Heart Rate (beats/min) 108  -CK     Posttreatment Heart Rate (beats/min) 100  -CK     Pre SpO2 (%) 99  -CK     O2 Delivery Pre Treatment room air  -CK     Intra SpO2 (%) 99  -CK     O2 Delivery Intra Treatment room air  -CK     Post SpO2 (%) 100  -CK     O2 Delivery Post Treatment room air  -CK     Pre Patient Position Supine  -CK     Intra Patient Position Sitting  -CK     Post  Patient Position Supine  -CK       Row Name 06/24/25 1429          Positioning and Restraints    Pre-Treatment Position in bed  -CK     Post Treatment Position bed  -CK     In Bed fowlers;call light within reach;encouraged to call for assist;notified nsg;with family/caregiver;with nsg  -CK               User Key  (r) = Recorded By, (t) = Taken By, (c) = Cosigned By      Initials Name Provider Type    Dorene Padilla PT Physical Therapist                   Outcome Measures       Row Name 06/24/25 1433          How much help from another person do you currently need...    Turning from your back to your side while in flat bed without using bedrails? 4  -CK     Moving from lying on back to sitting on the side of a flat bed without bedrails? 3  -CK     Moving to and from a bed to a chair (including a wheelchair)? 3  -CK     Standing up from a chair using your arms (e.g., wheelchair, bedside chair)? 3  -CK     Climbing 3-5 steps with a railing? 3  -CK     To walk in hospital room? 3  -CK     AM-PAC 6 Clicks Score (PT) 19  -CK     Highest Level of Mobility Goal Walk 10 Steps or More-6  -CK       Row Name 06/24/25 1433          PADD    Diagnosis 2  -CK     Gender 1  -CK     Age Group 2  -CK     Gait Distance 0  -CK     Assist Level 1  -CK     Home Support 3  -CK     PADD Score 9  -CK     Prediction by PADD Score directly home (with home health or out-patient rehab)  -CK       Row Name 06/24/25 1433          Functional Assessment    Outcome Measure Options AM-PAC 6 Clicks Basic Mobility (PT);PADD  -CK               User Key  (r) = Recorded By, (t) = Taken By, (c) = Cosigned By      Initials Name Provider Type    Dorene Padilla PT Physical Therapist                                 Physical Therapy Education       Title: PT OT SLP Therapies (Done)       Topic: Physical Therapy (Done)       Point: Mobility training (Done)       Learning Progress Summary            Patient Acceptance, E,H, VU by KRISTINE at 6/24/2025  1434   Significant Other Acceptance, E,H, VU by CK at 6/24/2025 1434                      Point: Home exercise program (Done)       Learning Progress Summary            Patient Acceptance, E,H, VU by CK at 6/24/2025 1434   Significant Other Acceptance, E,H, VU by CK at 6/24/2025 1434                      Point: Body mechanics (Done)       Learning Progress Summary            Patient Acceptance, E,H, VU by CK at 6/24/2025 1434   Significant Other Acceptance, E,H, VU by CK at 6/24/2025 1434                      Point: Precautions (Done)       Learning Progress Summary            Patient Acceptance, E,H, VU by CK at 6/24/2025 1434   Significant Other Acceptance, E,H, VU by CK at 6/24/2025 1434                                      User Key       Initials Effective Dates Name Provider Type Discipline    CK 02/06/24 -  Dorene Hernandez, PT Physical Therapist PT                  PT Recommendation and Plan  Planned Therapy Interventions (PT): balance training, bed mobility training, gait training, home exercise program, neuromuscular re-education, patient/family education, postural re-education, ROM (range of motion), stair training, strengthening, stretching, transfer training  Outcome Evaluation: Patient presents with deficits in strength, endurance, and balance. She was able to ambualte 110' CGAx2 with FWW and navigate 1 step per her home setup with CGA. She had no LOB or buckling with mobility. HEP reviewed with patient and her spouse. IPPT is indicated to address deficits while admitted. She is cleared to D/C home today with family assist and OPPT if medically appropriate. She owns a FWW.     Time Calculation:   PT Evaluation Complexity  History, PT Evaluation Complexity: 1-2 personal factors and/or comorbidities  Examination of Body Systems (PT Eval Complexity): total of 3 or more elements  Clinical Presentation (PT Evaluation Complexity): stable  Clinical Decision Making (PT Evaluation Complexity): low  complexity  Overall Complexity (PT Evaluation Complexity): low complexity     PT Charges       Row Name 06/24/25 1434             Time Calculation    Start Time 1313  -CK      PT Received On 06/24/25  -CK      PT Goal Re-Cert Due Date 07/04/25  -CK         Timed Charges    27815 - PT Therapeutic Exercise Minutes 10  -CK         Untimed Charges    PT Eval/Re-eval Minutes 47  -CK         Total Minutes    Timed Charges Total Minutes 10  -CK      Untimed Charges Total Minutes 47  -CK       Total Minutes 57  -CK                User Key  (r) = Recorded By, (t) = Taken By, (c) = Cosigned By      Initials Name Provider Type    CK Dorene Hernandez, PT Physical Therapist                  Therapy Charges for Today       Code Description Service Date Service Provider Modifiers Qty    98751303076 HC PT THER PROC EA 15 MIN 6/24/2025 Dorene Hernandez, PT GP 1    05459297026 HC PT EVAL LOW COMPLEXITY 4 6/24/2025 Dorene Hernandez, PT GP 1    24418183628 HC PT THER SUPP EA 15 MIN 6/24/2025 Dorene Hernandez, PT GP 3            PT G-Codes  Outcome Measure Options: AM-PAC 6 Clicks Basic Mobility (PT), PADD  AM-PAC 6 Clicks Score (PT): 19  PT Discharge Summary  Anticipated Discharge Disposition (PT): home with assist, home with outpatient therapy services    Dorene Hernandez, CARLOS ALBERTO  6/24/2025

## 2025-06-24 NOTE — OP NOTE
DATE OF PROCEDURE:  06/24/25    PREOPERATIVE DIAGNOSIS: right hip arthritis    POSTOPERATIVE DIAGNOSIS: right hip arthritis    PROCEDURE PERFORMED: right total hip arthroplasty with [employer]hew components, anterior modified Cummings-Shay approach    Surgical Approach: Hip Modified Anterior (Cummings-Shay)    IMPLANTS: # 52 press-fit R3 cup, 25 screw,  neutral polyethylene liner,  # 0 standard offset press-fit Polarstem, +0 x 36 Oxinium head    SURGEON: Familia Yao MD    ASSISTANT: Kunal Escamilla PA-C  (Kunal Escamilla PA-C was present and necessary for positioning, draping, retraction, instrumentation and closure.)    SPECIMENS: None    IMPLANTS:   Implant Name Type Inv. Item Serial No.  Lot No. LRB No. Used Action   DEV CONTRL TISS STRATAFIX SPIRAL MNCRYL UD 3/0 PLS 60CM - [id number] Implant DEV CONTRL TISS STRATAFIX SPIRAL MNCRYL UD 3/0 PLS 60CM  [employer]  DIV OF J AND J [id number] Right 1 Implanted   DEV CONTRL TISS STRATAFIX SYMM PDS PLUS [place] CT-1 45CM - [id number] Implant DEV CONTRL TISS STRATAFIX SYMM PDS PLUS [place] CT-1 45CM  [employer]  DIV OF J AND J [id number] Right 1 Implanted   SHLL ACET R3 3H STD 52MM - [id number] Implant SHLL ACET R3 3H STD 52MM  [employer] [id number] Right 1 Implanted   SCRW SPH HD REFLECTION 6.5X25MM - [id number] Implant SCRW SPH HD REFLECTION 6.5X25MM  SIM[employer] NEPHEW [id number] Right 1 Implanted   LINER ACET R3 XLPE 0D 16S10DE - [id number] Implant LINER ACET R3 XLPE 0D 90G59OD  SIM[employer] NEPHEW [id number] Right 1 Implanted   STEM FEM/HIP POLARSTEM W/COLR STD SZ0 - [id number] Implant STEM FEM/HIP POLARSTEM W/COLR STD SZ0  SMITH AND NEPHEW [id number] Right 1 Implanted   HD FEM/HIP OXINIUM TPR 12/14 36MM PLS0 - [id number] Implant HD FEM/HIP OXINIUM TPR 12/14 36MM PLS0  SIMS AND NEPHEW [id number] Right 1 Implanted         ANESTHESIA:  Spinal    STAFF:  Circulator: Valerie Blankenship RN  Radiology Technologist: Iván Camacho  Scrub Person: Alesha Tejada,  RN  Vendor Representative: Allen Coreas ([employer])  Nursing Assistant: Lyndsay Cervantes  Assistant: Sky Escamilla PA    ESTIMATED BLOOD LOSS: 50 cc     COMPLICATIONS: None    PREOPERATIVE ANTIBIOTICS: Ancef 2 g    INDICATIONS: The patient is a 64 y.o. female with a history of debilitating right hip pain secondary to osteoarthritis, that failed to improve in spite of conservative treatment. The patient opted for a right total hip arthroplasty at this time and consented for the procedure. Please see my office notes for details with regard to preoperative counseling and operative rationale.     DESCRIPTION OF PROCEDURE: The patient was positively identified in the preoperative holding area, brought to the operative suite, and placed in a supine position. After adequate spinal anesthetic had been achieved, the patient was placed in the supine position with a well padded folded blanket bolster under the right flank area, leaving the buttock free. After sterile prep and drape of the right hip and lower extremity, as well as draping the non-operative extremity to allow access for limb length assessment, a timeout procedure was performed to confirm the operative site, as well as the other parameters.     After placing a bump under the knee, a skin incision was made over the lateral border of the tensor fascia latae from the level of the anterior superior iliac spine distally on the anterior aspect of the hip for a modified Cummings-Shay approach. Following a sharp skin incision, dissection was carried down to the level of the fascia, ensuring clear identification of the interval between the tensor and the fascia laterally.  Fascia was then incised from proximal to distal, leaving a good cuff of tissue for later repair, then working form distal to proximal perforating vessels were identified and cauterized, including the perforating vessels along the anterior edge of the gluteus medius.  With the anterior edge  "of the gluteus medius identified, a Cobra retractor was placed on the capsule over the superior aspect of the femoral neck.  After identifying the superior edge of the vastus lateralis distally, a second Cobra retractor was placed over the capsule overlying the inferior femoral neck.  The reflected head of the rectus femoris was identified and a single prong acetabular retractor was placed under the rectus. The knee bump was then removed, leg externally rotated, and anterior capsule excised and the labrum incised superiorly.  Cobra retractors were repositioned on the exposed femoral neck both superiorly and inferiorly.  Description of femoral head: Osteophytes at the head neck junction, complete eburnation, thickening of the capsule, clear joint effusion.  A neck cut was then made at the head and neck junction with the aid of an oscillating saw blade, followed by a second cut at the base of the femoral neck.  The \"napkin ring\" femoral neck fragment was removed, as well as the femoral head.    Acetabular exposure was then obtained with careful retractor placement and the labrum was then removed from the rim of the acetabulum, preserving the transverse acetabular ligament.  Description of acetabulum: Diffuse degenerative changes, intact labrum around the periphery with thickening. With the transverse acetabular ligament as a reference for cup positioning, the acetabulum was sequentially reamed up to 52 to accommodate a 52 press-fit R3 cup, which had excellent press-fit characteristics.  A single 25 mm screw was placed which had excellent purchase, followed by a neutral polyethylene liner.    Attention was then redirected towards the femoral aspect. The non-operative limb was then placed on a padded Diggs stand, to allow for appropriate positioning of the operative limb.  Using the bone hook for traction in the calcar region of the proximal femur, the posterior capsule was released adjacent to the greater trochanter to " allow for delivery of the proximal femur with a double fang retractor.  After appropriate external rotation of the proximal femur and further adduction of the leg, a Ba retractor was placed in the region of the calcar and femoral preparations were made to accommodate the femoral stem.  Box osteotome was used to prepare the lateral aspect, followed by the T-handle canal finder, then sequential broaching of the femur to accommodate a # 0 broach, standard offset neck, with a -3 x 36 head.      Trial reduction was performed, full arc of motion noted, with appropriate limb lengths after leveling the table.  Intraoperative fluoroscopy showed appropriate implant alignment and slightly short leg lengths.  The hip was again dislocated and the final # 0 standard offset press-fit Polar stem placed and trial performed with a +0 x 36 head with excellent stability noted throughout the full arc of motion and appropriate limb lengths, followed by +0 x 36 Oxinium head, with the same reduction characteristics were noted as with the trial with no dislocation throughout the full arc of motion and appropriate limb lengths.      Therefore, the hip was copiously irrigated and attention directed towards closure. Fascia latae was closed with #1 Vicryl in an interrupted figure-of-eight fashion in 3 strategic locations both proximally, distally and in the central portion, followed by oversewing this from distal to proximal with a #1 StrataFix symmetric, which nicely sealed that layer, followed by closure of the subcutaneous layer with 2-0 Vicryl and the skin with 3-0 StrataFix in a running subcuticular fashion.  Adhesive wound closure dressing was applied followed by a sterile dressing with 4 x 4s secured with micropore tape.  The patient tolerated the procedure well and was brought to the recovery room in good condition.     POSTOPERATIVE PLAN:  1. The patient will begin early range of motion and weight-bearing per the post anterior  total hip arthroplasty protocol.   2. I anticipate brief hospitalization for initial rehabilitation and pain control followed by continued rehabilitation outpatient physical therapy setting.  Patient likely ready for discharge later today as long as she is cleared medically and by physical therapy.  Follow-up in 3 weeks as planned.   3. Postoperative medical management with Dr. Conrad.  4. Postoperative DVT prophylaxis with aspirin 81 mg p.o. twice daily for 1 month.  5. Postoperative IV antibiotics with Ancef.      Familia Yao MD  06/24/25  09:33 EDT

## 2025-06-24 NOTE — PLAN OF CARE
Goal Outcome Evaluation:  Plan of Care Reviewed With: patient           Outcome Evaluation: Patient presents with deficits in strength, endurance, and balance. She was able to ambualte 110' CGAx2 with FWW and navigate 1 step per her home setup with CGA. She had no LOB or buckling with mobility. HEP reviewed with patient and her spouse. IPPT is indicated to address deficits while admitted. She is cleared to D/C home today with family assist and OPPT if medically appropriate. She owns a FWW.    Anticipated Discharge Disposition (PT): home with assist, home with outpatient therapy services

## 2025-06-24 NOTE — ADDENDUM NOTE
Addendum  created 06/24/25 1605 by Vazquez Garcia CRNA    Clinical Note Signed, Diagnosis association updated, Intraprocedure Blocks edited

## 2025-06-24 NOTE — ANESTHESIA PREPROCEDURE EVALUATION
Anesthesia Evaluation     Patient summary reviewed and Nursing notes reviewed                Airway   Mallampati: II  TM distance: >3 FB  Neck ROM: full  No difficulty expected  Dental - normal exam     Pulmonary - negative pulmonary ROS and normal exam   Cardiovascular - normal exam    (+) hyperlipidemia      Neuro/Psych- negative ROS  GI/Hepatic/Renal/Endo    (+) thyroid problem hypothyroidism    Musculoskeletal (-) negative ROS    Abdominal  - normal exam    Bowel sounds: normal.   Substance History   (+) alcohol use     OB/GYN negative ob/gyn ROS         Other                      Anesthesia Plan    ASA 2     spinal     intravenous induction     Anesthetic plan, risks, benefits, and alternatives have been provided, discussed and informed consent has been obtained with: patient.    Plan discussed with CRNA.    CODE STATUS:

## 2025-06-24 NOTE — ANESTHESIA POSTPROCEDURE EVALUATION
Patient: Kimberly Brito    Procedure Summary       Date: 06/24/25 Room / Location:  [facility]  /  [facility]    Anesthesia Start: 0728 Anesthesia Stop: 0939    Procedure: TOTAL HIP ARTHROPLASTY ANTERIOR MODIFIED (Right: Hip) Diagnosis:       Primary osteoarthritis of right hip      (Primary osteoarthritis of right hip [M16.11])    Surgeons: Familia Yao MD Provider: Wilian Nelson MD    Anesthesia Type: spinal ASA Status: 2            Anesthesia Type: spinal    Vitals  Vitals Value Taken Time   BP 99/49 06/24/25 09:39   Temp 97.3 °F (36.3 °C) 06/24/25 09:39   Pulse 76 06/24/25 09:39   Resp 18 06/24/25 09:39   SpO2 100 % 06/24/25 09:39           Post Anesthesia Care and Evaluation    Patient location during evaluation: PACU  Patient participation: complete - patient participated  Level of consciousness: sleepy but conscious  Pain management: adequate    Airway patency: patent  Anesthetic complications: No anesthetic complications  PONV Status: none  Cardiovascular status: hemodynamically stable and acceptable  Respiratory status: nonlabored ventilation, acceptable and nasal cannula  Hydration status: acceptable

## 2025-06-24 NOTE — NURSING NOTE
Caregiver Telephone Number    Phone Number for Ride/Caregiver: JUSTIN JONES (SPOUSE) [phone]     Who will be staying with you post procedure for the next 24 hours? Name and Phone Number?: JUSTIN JONES (SPOUSE) [phone]

## 2025-06-26 ENCOUNTER — TREATMENT (OUTPATIENT)
Dept: PHYSICAL THERAPY | Facility: CLINIC | Age: 65
End: 2025-06-26
Payer: COMMERCIAL

## 2025-06-26 DIAGNOSIS — M25.551 RIGHT HIP PAIN: Primary | ICD-10-CM

## 2025-06-26 DIAGNOSIS — Z96.641 STATUS POST RIGHT HIP REPLACEMENT: ICD-10-CM

## 2025-06-26 NOTE — PROGRESS NOTES
Physical Therapy Initial Evaluation and Plan of Care    [facility] [place]  [address] [place], [place] [id number] [phone]    Patient: Kimberly Brito   : 1960  Diagnosis/ICD-10 Code:  No primary diagnosis found.  Referring practitioner: JENSEN Lobo*    Subjective Evaluation    History of Present Illness  Date of onset: 2024  Date of surgery: 2025    Subjective comment: Was having chronic right hip for over a year.  Elected to have her right hip replaced.  No fever or chills.  No longer has groin pain.  Has some pain radiating down the anterior thigh.  No numbness or tingling in the right leg or foot.Quality of life: excellent    Pain  Aggravating factors: sleeping, prolonged positioning, squatting and ambulation    Social Support  Lives in: multiple-level home (Ranch with basement)  Lives with: spouse    Treatments  Previous treatment: physical therapy  Patient Goals  Patient goals for therapy: decreased pain, increased motion, increased strength, return to sport/leisure activities, improved balance and independence with ADLs/IADLs           *LEFS:      Objective          Neurological Testing     Reflexes   Left   Patellar (L4): trace (1+)  Achilles (S1): absent (0)    Right   Patellar (L4): trace (1+)  Achilles (S1): absent (0)    Passive Range of Motion     Right Hip   Flexion: 90 degrees   Extension: 0 degrees     Strength/Myotome Testing     Right Hip   Planes of Motion   Abduction: 4-    Right Knee   Extension: 4+    Right Ankle/Foot   Dorsiflexion: 5          Assessment & Plan       Assessment  Impairments: abnormal or restricted ROM, activity intolerance, impaired physical strength, pain with function and weight-bearing intolerance   Functional limitations: sleeping, walking, standing and unable to perform repetitive tasks   Assessment details: Mrs. Brito is a very pleasant 64-year-old female who presents physical therapy status  post right total hip arthroplasty performed on June 4, 2025.  Is present with her supportive  today.  Ambulates with a walker.  No constitutional symptoms.  Has good force output of the right hip and lower extremity musculature.  Right hip mobility is limited in flexion secondary to pain.  Will focus care on restoring mobility.  Combined with a strengthening program.  Followed by graded loading program.  Prognosis: good    Goals  Plan Goals: STGs:  1.)  LEFS improved by 1M RACHELLE in 6 weeks.  2.)  Have 120 degrees of passive right hip flexion in 6 weeks.  LTGs:  1.)  Have 5 out of 5 right hip strength in all planes and 8 weeks.  2.)  Return to all premorbid functional activities without pain or restriction in 12 weeks.    Plan  Therapy options: will be seen for skilled therapy services  Planned modality interventions: thermotherapy (paraffin bath), cryotherapy and high voltage pulsed current (pain management)  Planned therapy interventions: therapeutic activities, stretching, strengthening, manual therapy, abdominal trunk stabilization, functional ROM exercises, home exercise program and balance/weight-bearing training  Frequency: 2x week  Duration in visits: 20  Duration in weeks: 10  Treatment plan discussed with: patient and family        Manual Therapy:    9     mins  71792;  Therapeutic Exercise:    15     mins  76679;     Neuromuscular Anisa:        mins  08106;    Therapeutic Activity:     15     mins  83791;     Gait Training:           mins  92714;     Ultrasound:         mins  13852;    Electrical Stimulation:         mins  80472 ( );  Dry Needling          mins self-pay    Timed Treatment:   39   mins   Total Treatment:     50   mins    PT SIGNATURE: Familia Braga, PT   DATE TREATMENT INITIATED: 6/26/2025    Initial Certification  Certification Period: 9/24/2025  I certify that the therapy services are furnished while this patient is under my care.  The services outlined above are required  by this patient, and will be reviewed every 90 days.     PHYSICIAN: Karlie Tam PA-C  NPI: [id number]                                      DATE:    Please sign and return via fax to [phone].. Thank you, [facility].

## 2025-06-27 ENCOUNTER — TELEPHONE (OUTPATIENT)
Dept: ORTHOPEDIC SURGERY | Facility: CLINIC | Age: 65
End: 2025-06-27
Payer: COMMERCIAL

## 2025-06-30 ENCOUNTER — TREATMENT (OUTPATIENT)
Dept: PHYSICAL THERAPY | Facility: CLINIC | Age: 65
End: 2025-06-30
Payer: COMMERCIAL

## 2025-06-30 DIAGNOSIS — M25.551 RIGHT HIP PAIN: Primary | ICD-10-CM

## 2025-06-30 DIAGNOSIS — Z96.641 STATUS POST RIGHT HIP REPLACEMENT: ICD-10-CM

## 2025-06-30 PROCEDURE — 97110 THERAPEUTIC EXERCISES: CPT | Performed by: PHYSICAL THERAPIST

## 2025-06-30 PROCEDURE — 97140 MANUAL THERAPY 1/> REGIONS: CPT | Performed by: PHYSICAL THERAPIST

## 2025-06-30 PROCEDURE — 97530 THERAPEUTIC ACTIVITIES: CPT | Performed by: PHYSICAL THERAPIST

## 2025-06-30 PROCEDURE — 97112 NEUROMUSCULAR REEDUCATION: CPT | Performed by: PHYSICAL THERAPIST

## 2025-07-01 ENCOUNTER — TELEPHONE (OUTPATIENT)
Dept: ORTHOPEDIC SURGERY | Facility: CLINIC | Age: 65
End: 2025-07-01
Payer: COMMERCIAL

## 2025-07-01 NOTE — TELEPHONE ENCOUNTER
Called pt to see how she was doing 7 days s/p ANTIONE. She reports pain and swelling are minimal; Has been using ice on the incision to help with the swelling/tightness but denies any concerns with the incision (denies any drainage). Reports PT is going well; Still taking the 81mg ASA BID for DVT prevention.    No further action needed at this time but was encouraged to call back if anything changes.    Mignon ENGLE CMA ([facility]), ROT

## 2025-07-01 NOTE — PROGRESS NOTES
Physical Therapy Daily Progress Note    Patient: Kimberly Brito   : 1960  Diagnosis/ICD-10 Code:  Right hip pain [M25.551]  Referring practitioner: JENSEN Lobo*  Date of Initial Visit: Type: THERAPY  Noted: 2025  Today's Date: 2025  Patient seen for 2 sessions         Kimberly Brito reports that her hip is a little better since last visit.  Sleeping well.  Notices some incisional soreness.  Reports checking her incision and it looks clean.  Has had some pain that is deeper in the hip joint.      Subjective     Objective   See Exercise, Manual, and Modality Logs for complete treatment.       Assessment/Plan  Focused visit on improving right anterior thigh soreness and hip flexion mobility via manual therapy.  Combined with improving right hip and thigh strength.  Followed by improving force output of the proximal hip and thigh musculature.  Will f/u next week.       Manual Therapy:    8     mins  88280;  Therapeutic Exercise:    23     mins  26605;     Neuromuscular Anisa:    8    mins  01556;    Therapeutic Activity:     8     mins  61256;     Gait Training:           mins  07349;     Ultrasound:          mins  52630;    Electrical Stimulation:         mins  03172 ( );  Dry Needling          mins self-pay    Timed Treatment:   47   mins   Total Treatment:     47   mins    Familia Braga, PT  Physical Therapist

## 2025-07-08 ENCOUNTER — TREATMENT (OUTPATIENT)
Dept: PHYSICAL THERAPY | Facility: CLINIC | Age: 65
End: 2025-07-08
Payer: COMMERCIAL

## 2025-07-08 DIAGNOSIS — M25.551 RIGHT HIP PAIN: Primary | ICD-10-CM

## 2025-07-08 DIAGNOSIS — Z96.641 STATUS POST RIGHT HIP REPLACEMENT: ICD-10-CM

## 2025-07-08 NOTE — PROGRESS NOTES
Physical Therapy Daily Progress Note    Patient: Kimberly Brito   : 1960  Diagnosis/ICD-10 Code:  Right hip pain [M25.551]  Referring practitioner: JENSEN Lobo*  Date of Initial Visit: Type: THERAPY  Noted: 2025  Today's Date: 2025  Patient seen for 3 sessions         Kimberly Brito reports that she is walking 1/2 mile 3 times per day.  Stiff in the morning.  Finishes up Meloxicam tomorrow.      Subjective     Objective   See Exercise, Manual, and Modality Logs for complete treatment.       Assessment/Plan  Continued emphasis on improving right hip sensitivity along the lateral and anterior aspects via manual therapy.  Combined with improving proximal hip strength in all planes.  Followed by improving loading tolerance in weight-bearing.  Ambulates without antalgia without a SP cane, but will continue to use her cane when out in public.  Will f/u next week for re-assessment.            Manual Therapy:    8     mins  44103;  Therapeutic Exercise:    8     mins  46549;     Neuromuscular Anisa:    8    mins  94201;    Therapeutic Activity:     8     mins  92379;     Gait Training:           mins  00572;     Ultrasound:         mins  83375;    Electrical Stimulation:         mins  89325 ( );  Dry Needling         mins self-pay    Timed Treatment:   32   mins   Total Treatment:     32   mins    Familia Braga PT  Physical Therapist

## 2025-07-15 ENCOUNTER — TREATMENT (OUTPATIENT)
Dept: PHYSICAL THERAPY | Facility: CLINIC | Age: 65
End: 2025-07-15
Payer: COMMERCIAL

## 2025-07-15 DIAGNOSIS — Z96.641 STATUS POST RIGHT HIP REPLACEMENT: ICD-10-CM

## 2025-07-15 DIAGNOSIS — M25.551 RIGHT HIP PAIN: Primary | ICD-10-CM

## 2025-07-15 NOTE — PROGRESS NOTES
Physical Therapy Daily Progress Note    Patient: Kimberly Brito   : 1960  Diagnosis/ICD-10 Code:  Right hip pain [M25.551]  Referring practitioner: JENSEN Lobo*  Date of Initial Visit: Type: THERAPY  Noted: 2025  Today's Date: 7/15/2025  Patient seen for 4 sessions         Kimberly Brito reports having an increase in right anterior and lateral hip pain since last visit.  Is being consistent with home exercises and walking program.      Subjective     Objective   See Exercise, Manual, and Modality Logs for complete treatment.       Assessment/Plan  Has localized tenderness along the anterior and lateral hip regions today.  Educated Mrs. Brito to reduce walking distance and take a week from abduction strengthening.  Appears her hip is aggravated from overloading from home exercise and walking program.  Will f/u next week for re-assessment.            Manual Therapy:    8     mins  67177;  Therapeutic Exercise:    8     mins  30515;     Neuromuscular Anisa:    8    mins  91636;    Therapeutic Activity:     8     mins  24183;     Gait Training:           mins  99164;     Ultrasound:          mins  30402;    Electrical Stimulation:         mins  88054 ( );  Dry Needling          mins self-pay    Timed Treatment:   32   mins   Total Treatment:     32   mins    Familia Braga, PT  Physical Therapist

## 2025-07-16 ENCOUNTER — OFFICE VISIT (OUTPATIENT)
Dept: ORTHOPEDIC SURGERY | Facility: CLINIC | Age: 65
End: 2025-07-16
Payer: COMMERCIAL

## 2025-07-16 VITALS — TEMPERATURE: 97.3 F

## 2025-07-16 DIAGNOSIS — Z96.641 STATUS POST TOTAL REPLACEMENT OF RIGHT HIP: Primary | ICD-10-CM

## 2025-07-16 PROCEDURE — 99024 POSTOP FOLLOW-UP VISIT: CPT | Performed by: PHYSICIAN ASSISTANT

## 2025-07-16 RX ORDER — CEPHALEXIN 500 MG/1
CAPSULE ORAL
Qty: 4 CAPSULE | Refills: 0 | Status: SHIPPED | OUTPATIENT
Start: 2025-07-16

## 2025-07-16 NOTE — PROGRESS NOTES
[facility] Note      Subjective     CC: Post-op (3 weeks status post Right total hip arthroplasty 06/24/2025)      HPI    Kimberly Brito is a 64 y.o. female.  Patient presents status post right total hip arthroplasty with Dr. Yao.  She reports pain is different now than prior to surgery.  She is not taking anything for pain doing physical therapy.      ROS:    Constiutional:Pt denies fever, chills, nausea, or vomiting.  MSK:as above        Objective      Past Medical History  Past Medical History:   Diagnosis Date    Degenerative arthritis of hip 04/07/2025    Melanoma of hip 1998    Osteoporosis 2014    Rotator cuff tendinitis of right shoulder          Physical Exam  Temp 97.3 °F (36.3 °C)   LMP  (LMP Unknown)     There is no height or weight on file to calculate BMI.    Patient is well nourished and well developed.        Ortho Exam  Right hip exam: Anterior hip incision is healing well.  Intact flexors adductor's abductor's with good strength.  Neurovascular intact distally.  Ambulating with cane    Imaging/Labs/EMG Reviewed:  Imaging Results (Last 24 Hours)       Procedure Component Value Units Date/Time    XR Hip With or Without Pelvis 2 - 3 View Right [[id number]] Resulted: 07/16/25 1020     Updated: 07/16/25 1020    Narrative:      Right Hip Radiographs  Indication: status-post right total hip arthroplasty  Views: low AP pelvis and lateral of the right hip    Comparison: no change compared to prior study, 6/24/2025    Findings:   The components are well aligned, with no signs of loosening or failure.                 Assessment    Assessment:  1. Status post total replacement of right hip        Plan:  Recommend over the counter anti-inflammatories for pain and/or swelling  Doing well status post right total hip arthroplasty with Dr. Yao on 6/24/2025.  I reassured the patient that the pain she is experiencing at this time is very normal.  She has a crown that is causing pain and  needs to be ground down.  I have sent her prescription for Keflex to take 1 hour prior to the procedure.  She return to see Dr. Yao in 6 weeks, sooner if needed.    Patient history, diagnosis and treatment plan discussed with Dr. Yao.        Karlie Tam PA-C  07/16/25  10:41 EDT      Dragon disclaimer:  Much of this encounter note is an electronic transcription/translation of spoken language to printed text. The electronic translation of spoken language may permit erroneous, or at times, nonsensical words or phrases to be inadvertently transcribed; Although I have reviewed the note for such errors, some may still exist.        Answers submitted by the patient for this visit:  Post Operative Visit (Submitted on 7/14/2025)  Chief Complaint: Follow-up  Pain Control: controlled  Fever: no fever  Diet: adequate intake  Activity: returning to normal  Operative Site Issues: No

## 2025-07-21 ENCOUNTER — TREATMENT (OUTPATIENT)
Dept: PHYSICAL THERAPY | Facility: CLINIC | Age: 65
End: 2025-07-21
Payer: COMMERCIAL

## 2025-07-21 ENCOUNTER — TELEPHONE (OUTPATIENT)
Dept: FAMILY MEDICINE CLINIC | Facility: CLINIC | Age: 65
End: 2025-07-21
Payer: COMMERCIAL

## 2025-07-21 DIAGNOSIS — Z96.641 STATUS POST RIGHT HIP REPLACEMENT: ICD-10-CM

## 2025-07-21 DIAGNOSIS — M25.551 RIGHT HIP PAIN: Primary | ICD-10-CM

## 2025-07-21 PROCEDURE — 97530 THERAPEUTIC ACTIVITIES: CPT | Performed by: PHYSICAL THERAPIST

## 2025-07-21 PROCEDURE — 97112 NEUROMUSCULAR REEDUCATION: CPT | Performed by: PHYSICAL THERAPIST

## 2025-07-21 PROCEDURE — 97140 MANUAL THERAPY 1/> REGIONS: CPT | Performed by: PHYSICAL THERAPIST

## 2025-07-21 PROCEDURE — 97110 THERAPEUTIC EXERCISES: CPT | Performed by: PHYSICAL THERAPIST

## 2025-07-21 RX ORDER — MELOXICAM 15 MG/1
15 TABLET ORAL DAILY
Qty: 30 TABLET | Refills: 5 | Status: SHIPPED | OUTPATIENT
Start: 2025-07-21

## 2025-07-21 NOTE — PROGRESS NOTES
Physical Therapy Daily Progress Note    Patient: Kimberly Brito   : 1960  Diagnosis/ICD-10 Code:  No primary diagnosis found.  Referring practitioner: JENSEN Lobo*  Date of Initial Visit: Type: THERAPY  Noted: 2025  Today's Date: 2025  Patient seen for 5 sessions         Kimberly Brito reports that her right lateral hip is sore and described as aching.  Occurs with side stepping and walking.  No groin or anterior hip pain.      Subjective     Objective   See Exercise, Manual, and Modality Logs for complete treatment.       Assessment/Plan  Focused visit on reducing right hip sensitivity and stiffness via manual therapy.  Combined with improving hip abduction muscle endurance without aggravating symptoms.  Combined with improving balance and loading tolerance.  Recommend Mrs. Brito contact her PCP regarding refill of Meloxicam.  Using ice 3 times per day for hip pain.  Combined with moving up to 15 minutes of walking, 2 times per day.  Will f/u next week for re-assessment.            Manual Therapy:    8     mins  82057;  Therapeutic Exercise:    14     mins  28686;     Neuromuscular Anisa:    9    mins  26285;    Therapeutic Activity:     9     mins  59077;     Gait Training:           mins  59266;     Ultrasound:         mins  61476;    Electrical Stimulation:         mins  34128 ( );  Dry Needling          mins self-pay    Timed Treatment:   40   mins   Total Treatment:     40   mins    Familia Braga PT  Physical Therapist

## 2025-07-21 NOTE — TELEPHONE ENCOUNTER
Patient has had hip replacement surgery and is currently doing physical therapy here at [facility] and the pt said she should probably be on some meloxicam to help with her pain thru healing and therapy, they were wanting to know if Dr. Wright will send this in for her since he is her PCP? [facility] on [address]

## 2025-07-28 ENCOUNTER — TREATMENT (OUTPATIENT)
Dept: PHYSICAL THERAPY | Facility: CLINIC | Age: 65
End: 2025-07-28
Payer: COMMERCIAL

## 2025-07-28 DIAGNOSIS — Z96.641 STATUS POST RIGHT HIP REPLACEMENT: ICD-10-CM

## 2025-07-28 DIAGNOSIS — M25.551 RIGHT HIP PAIN: Primary | ICD-10-CM

## 2025-07-28 PROCEDURE — 97110 THERAPEUTIC EXERCISES: CPT | Performed by: PHYSICAL THERAPIST

## 2025-07-28 PROCEDURE — 97530 THERAPEUTIC ACTIVITIES: CPT | Performed by: PHYSICAL THERAPIST

## 2025-07-28 PROCEDURE — 97140 MANUAL THERAPY 1/> REGIONS: CPT | Performed by: PHYSICAL THERAPIST

## 2025-07-28 PROCEDURE — 97112 NEUROMUSCULAR REEDUCATION: CPT | Performed by: PHYSICAL THERAPIST

## 2025-07-28 NOTE — PROGRESS NOTES
Physical Therapy Daily Progress Note    Patient: Kimberly Brito   : 1960  Diagnosis/ICD-10 Code:  Right hip pain [M25.551]  Referring practitioner: JENSEN Lobo*  Date of Initial Visit: Type: THERAPY  Noted: 2025  Today's Date: 2025  Patient seen for 6 sessions         Kimberly Brito reports improvement in right hip pain with starting Meloxicam last Wednesday.  Still having right anterior and lateral hip pain.    Aggs:  *Hooklying R hip ER (returning to neutral from hip IR actively)  *R hip ER in standing ([facility])      Subjective     Objective   See Exercise, Manual, and Modality Logs for complete treatment.       Assessment/Plan  Continued emphasis on reducing right anterior and lateral hip sensitivity via manual therapy.  Combined with isometric exercises to reduce right hip pain and improve force output of the proximal hip mm.  Updated HEP and provided instruction on pain management as it relates to participation in ADLs/iADLs.  Will f/u next week for re-assessment.          Manual Therapy:    9     mins  55163;  Therapeutic Exercise:    10     mins  53471;     Neuromuscular Anisa:    8   mins  34871;    Therapeutic Activity:    10      mins  23521;     Gait Training:           mins  64714;     Ultrasound:         mins  94045;    Electrical Stimulation:         mins  66207 ( );  Dry Needling          mins self-pay    Timed Treatment:   37   mins   Total Treatment:     37   mins    Familia Braga PT  Physical Therapist

## 2025-08-01 PROCEDURE — 87088 URINE BACTERIA CULTURE: CPT

## 2025-08-01 PROCEDURE — 87086 URINE CULTURE/COLONY COUNT: CPT

## 2025-08-01 PROCEDURE — 87186 SC STD MICRODIL/AGAR DIL: CPT

## 2025-08-05 ENCOUNTER — TREATMENT (OUTPATIENT)
Dept: PHYSICAL THERAPY | Facility: CLINIC | Age: 65
End: 2025-08-05
Payer: COMMERCIAL

## 2025-08-05 DIAGNOSIS — Z96.641 STATUS POST RIGHT HIP REPLACEMENT: ICD-10-CM

## 2025-08-05 DIAGNOSIS — M25.551 RIGHT HIP PAIN: Primary | ICD-10-CM

## 2025-08-05 PROCEDURE — 97140 MANUAL THERAPY 1/> REGIONS: CPT | Performed by: PHYSICAL THERAPIST

## 2025-08-05 PROCEDURE — 97530 THERAPEUTIC ACTIVITIES: CPT | Performed by: PHYSICAL THERAPIST

## 2025-08-05 PROCEDURE — 97112 NEUROMUSCULAR REEDUCATION: CPT | Performed by: PHYSICAL THERAPIST

## 2025-08-05 PROCEDURE — 97110 THERAPEUTIC EXERCISES: CPT | Performed by: PHYSICAL THERAPIST

## 2025-08-11 ENCOUNTER — TREATMENT (OUTPATIENT)
Dept: PHYSICAL THERAPY | Facility: CLINIC | Age: 65
End: 2025-08-11
Payer: COMMERCIAL

## 2025-08-11 DIAGNOSIS — M25.551 RIGHT HIP PAIN: Primary | ICD-10-CM

## 2025-08-11 DIAGNOSIS — Z96.641 STATUS POST RIGHT HIP REPLACEMENT: ICD-10-CM

## 2025-08-11 PROCEDURE — 97140 MANUAL THERAPY 1/> REGIONS: CPT | Performed by: PHYSICAL THERAPIST

## 2025-08-11 PROCEDURE — 97112 NEUROMUSCULAR REEDUCATION: CPT | Performed by: PHYSICAL THERAPIST

## 2025-08-11 PROCEDURE — 97530 THERAPEUTIC ACTIVITIES: CPT | Performed by: PHYSICAL THERAPIST

## 2025-08-11 PROCEDURE — 97110 THERAPEUTIC EXERCISES: CPT | Performed by: PHYSICAL THERAPIST

## 2025-08-18 ENCOUNTER — TREATMENT (OUTPATIENT)
Dept: PHYSICAL THERAPY | Facility: CLINIC | Age: 65
End: 2025-08-18
Payer: COMMERCIAL

## 2025-08-18 DIAGNOSIS — M25.551 RIGHT HIP PAIN: Primary | ICD-10-CM

## 2025-08-18 DIAGNOSIS — Z96.641 STATUS POST RIGHT HIP REPLACEMENT: ICD-10-CM

## 2025-08-18 PROCEDURE — 97140 MANUAL THERAPY 1/> REGIONS: CPT | Performed by: PHYSICAL THERAPIST

## 2025-08-18 PROCEDURE — 97110 THERAPEUTIC EXERCISES: CPT | Performed by: PHYSICAL THERAPIST

## 2025-08-18 PROCEDURE — 97112 NEUROMUSCULAR REEDUCATION: CPT | Performed by: PHYSICAL THERAPIST

## 2025-08-18 PROCEDURE — 97530 THERAPEUTIC ACTIVITIES: CPT | Performed by: PHYSICAL THERAPIST

## 2025-08-22 ENCOUNTER — TREATMENT (OUTPATIENT)
Dept: PHYSICAL THERAPY | Facility: CLINIC | Age: 65
End: 2025-08-22
Payer: COMMERCIAL

## 2025-08-22 DIAGNOSIS — Z96.641 STATUS POST RIGHT HIP REPLACEMENT: ICD-10-CM

## 2025-08-22 DIAGNOSIS — M25.551 RIGHT HIP PAIN: Primary | ICD-10-CM

## (undated) DEVICE — TOWEL,OR,DSP,ST,BLUE,STD,4/PK,20PK/CS: Brand: MEDLINE

## (undated) DEVICE — PENCL SMOKE/EVAC MEGADYNE TELESCP 10FT

## (undated) DEVICE — 3M™ MEDIPORE™ H SOFT CLOTH SURGICAL TAPE, 2863, 3 IN X 10 YD, 12/CASE: Brand: 3M™ MEDIPORE™

## (undated) DEVICE — ANTIBACTERIAL UNDYED BRAIDED (POLYGLACTIN 910), SYNTHETIC ABSORBABLE SUTURE: Brand: COATED VICRYL

## (undated) DEVICE — DRAPE,T,LIMB,BILATERAL,STERILE: Brand: MEDLINE

## (undated) DEVICE — SPNG GZ WOVN 4X4IN 12PLY 10/BX STRL

## (undated) DEVICE — TAPE,CLOTH/SILK,CURAD,3"X10YD,LF,40/CS: Brand: CURAD

## (undated) DEVICE — COVER,C-ARM,41X74: Brand: MEDLINE

## (undated) DEVICE — C-ARM DRAPE: Brand: DEROYAL

## (undated) DEVICE — ELECTRD BLD EZ CLN STD 4IN

## (undated) DEVICE — APPL CHLORAPREP TINTED 26ML TEAL

## (undated) DEVICE — GLV SURG PREMIERPRO MIC LTX PF SZ8.5 BRN

## (undated) DEVICE — TRAP FLD MINIVAC MEGADYNE 100ML

## (undated) DEVICE — SYR LUERLOK 20CC BX/50

## (undated) DEVICE — DRAPE,TOP,102X53,STERILE: Brand: MEDLINE

## (undated) DEVICE — DRAPE,REIN 53X77,STERILE: Brand: MEDLINE

## (undated) DEVICE — PK HIP TOTL UNIV 10

## (undated) DEVICE — BLANKT WARM UPPR/BDY ARM/OUT 57X196CM

## (undated) DEVICE — GLV SURG SENSICARE PI MIC PF SZ9 LF STRL

## (undated) DEVICE — SPINAL TRAY/P: Brand: MEDLINE INDUSTRIES, INC.

## (undated) DEVICE — STRYKER PERFORMANCE SERIES SAGITTAL BLADE: Brand: STRYKER PERFORMANCE SERIES

## (undated) DEVICE — SYS SKIN EXOFIN WND CLS 4X22CM

## (undated) DEVICE — STOCKINETTE,IMPERVIOUS,12X48,STERILE: Brand: MEDLINE

## (undated) DEVICE — BNDG ELAS CO-FLEX SLF ADHR 6IN 5YD LF STRL

## (undated) DEVICE — PATIENT RETURN ELECTRODE, SINGLE-USE, CONTACT QUALITY MONITORING, ADULT, WITH 9FT CORD, FOR PATIENTS WEIGING OVER 33LBS. (15KG): Brand: MEGADYNE